# Patient Record
Sex: MALE | Race: WHITE | Employment: FULL TIME | URBAN - METROPOLITAN AREA
[De-identification: names, ages, dates, MRNs, and addresses within clinical notes are randomized per-mention and may not be internally consistent; named-entity substitution may affect disease eponyms.]

---

## 2017-01-09 ENCOUNTER — OFFICE VISIT (OUTPATIENT)
Dept: INTERNAL MEDICINE CLINIC | Age: 67
End: 2017-01-09

## 2017-01-09 VITALS
TEMPERATURE: 98 F | HEIGHT: 72 IN | WEIGHT: 234 LBS | SYSTOLIC BLOOD PRESSURE: 136 MMHG | HEART RATE: 76 BPM | DIASTOLIC BLOOD PRESSURE: 60 MMHG | BODY MASS INDEX: 31.69 KG/M2

## 2017-01-09 DIAGNOSIS — E78.49 OTHER HYPERLIPIDEMIA: ICD-10-CM

## 2017-01-09 DIAGNOSIS — Z01.818 PRE-OP TESTING: ICD-10-CM

## 2017-01-09 DIAGNOSIS — M25.562 ACUTE PAIN OF LEFT KNEE: ICD-10-CM

## 2017-01-09 DIAGNOSIS — I10 ESSENTIAL HYPERTENSION: Primary | Chronic | ICD-10-CM

## 2017-01-09 LAB
ALBUMIN SERPL-MCNC: 4.5 G/DL (ref 3.4–5)
ANION GAP SERPL CALCULATED.3IONS-SCNC: 14 MMOL/L (ref 3–16)
BASOPHILS ABSOLUTE: 0 K/UL (ref 0–0.2)
BASOPHILS RELATIVE PERCENT: 0.4 %
BUN BLDV-MCNC: 14 MG/DL (ref 7–20)
CALCIUM SERPL-MCNC: 8.9 MG/DL (ref 8.3–10.6)
CHLORIDE BLD-SCNC: 102 MMOL/L (ref 99–110)
CO2: 23 MMOL/L (ref 21–32)
CREAT SERPL-MCNC: 1.1 MG/DL (ref 0.8–1.3)
EOSINOPHILS ABSOLUTE: 0.3 K/UL (ref 0–0.6)
EOSINOPHILS RELATIVE PERCENT: 3.6 %
GFR AFRICAN AMERICAN: >60
GFR NON-AFRICAN AMERICAN: >60
GLUCOSE BLD-MCNC: 84 MG/DL (ref 70–99)
HCT VFR BLD CALC: 44.6 % (ref 40.5–52.5)
HEMOGLOBIN: 14.7 G/DL (ref 13.5–17.5)
LYMPHOCYTES ABSOLUTE: 2.3 K/UL (ref 1–5.1)
LYMPHOCYTES RELATIVE PERCENT: 27.2 %
MCH RBC QN AUTO: 30 PG (ref 26–34)
MCHC RBC AUTO-ENTMCNC: 32.9 G/DL (ref 31–36)
MCV RBC AUTO: 91.2 FL (ref 80–100)
MONOCYTES ABSOLUTE: 1 K/UL (ref 0–1.3)
MONOCYTES RELATIVE PERCENT: 11.8 %
NEUTROPHILS ABSOLUTE: 4.9 K/UL (ref 1.7–7.7)
NEUTROPHILS RELATIVE PERCENT: 57 %
PDW BLD-RTO: 13.4 % (ref 12.4–15.4)
PHOSPHORUS: 3.2 MG/DL (ref 2.5–4.9)
PLATELET # BLD: 201 K/UL (ref 135–450)
PMV BLD AUTO: 9.9 FL (ref 5–10.5)
POTASSIUM SERPL-SCNC: 4.5 MMOL/L (ref 3.5–5.1)
RBC # BLD: 4.89 M/UL (ref 4.2–5.9)
SODIUM BLD-SCNC: 139 MMOL/L (ref 136–145)
WBC # BLD: 8.6 K/UL (ref 4–11)

## 2017-01-09 PROCEDURE — 99214 OFFICE O/P EST MOD 30 MIN: CPT | Performed by: INTERNAL MEDICINE

## 2017-01-09 PROCEDURE — 93000 ELECTROCARDIOGRAM COMPLETE: CPT | Performed by: INTERNAL MEDICINE

## 2017-01-09 ASSESSMENT — PATIENT HEALTH QUESTIONNAIRE - PHQ9
1. LITTLE INTEREST OR PLEASURE IN DOING THINGS: 0
SUM OF ALL RESPONSES TO PHQ9 QUESTIONS 1 & 2: 0
SUM OF ALL RESPONSES TO PHQ QUESTIONS 1-9: 0
2. FEELING DOWN, DEPRESSED OR HOPELESS: 0

## 2017-02-23 RX ORDER — IBUPROFEN 800 MG/1
800 TABLET ORAL EVERY 8 HOURS PRN
Qty: 90 TABLET | Refills: 3 | Status: SHIPPED | OUTPATIENT
Start: 2017-02-23 | End: 2018-04-09 | Stop reason: SDUPTHER

## 2017-03-15 ENCOUNTER — TELEPHONE (OUTPATIENT)
Dept: INTERNAL MEDICINE CLINIC | Age: 67
End: 2017-03-15

## 2017-04-07 ENCOUNTER — OFFICE VISIT (OUTPATIENT)
Dept: INTERNAL MEDICINE CLINIC | Age: 67
End: 2017-04-07

## 2017-04-07 VITALS
HEIGHT: 72 IN | HEART RATE: 60 BPM | DIASTOLIC BLOOD PRESSURE: 66 MMHG | BODY MASS INDEX: 31.42 KG/M2 | WEIGHT: 232 LBS | TEMPERATURE: 97.8 F | SYSTOLIC BLOOD PRESSURE: 130 MMHG

## 2017-04-07 DIAGNOSIS — R05.9 COUGH: ICD-10-CM

## 2017-04-07 DIAGNOSIS — Z12.5 PROSTATE CANCER SCREENING: ICD-10-CM

## 2017-04-07 DIAGNOSIS — E78.49 OTHER HYPERLIPIDEMIA: Primary | Chronic | ICD-10-CM

## 2017-04-07 DIAGNOSIS — Z23 NEED FOR VACCINATION WITH 13-POLYVALENT PNEUMOCOCCAL CONJUGATE VACCINE: ICD-10-CM

## 2017-04-07 PROCEDURE — 90670 PCV13 VACCINE IM: CPT | Performed by: INTERNAL MEDICINE

## 2017-04-07 PROCEDURE — 90471 IMMUNIZATION ADMIN: CPT | Performed by: INTERNAL MEDICINE

## 2017-04-07 PROCEDURE — 99214 OFFICE O/P EST MOD 30 MIN: CPT | Performed by: INTERNAL MEDICINE

## 2017-04-22 ENCOUNTER — HOSPITAL ENCOUNTER (OUTPATIENT)
Dept: OTHER | Age: 67
Discharge: HOME | End: 2017-04-22
Attending: INTERNAL MEDICINE | Admitting: INTERNAL MEDICINE

## 2017-04-22 LAB
A/G RATIO: 1.5 (ref 1.1–2.2)
ALBUMIN SERPL-MCNC: 4.1 G/DL (ref 3.4–5)
ALP BLD-CCNC: 57 U/L (ref 40–129)
ALT SERPL-CCNC: 24 U/L (ref 10–40)
ANION GAP SERPL CALCULATED.3IONS-SCNC: 12 MMOL/L (ref 3–16)
AST SERPL-CCNC: 24 U/L (ref 15–37)
BILIRUB SERPL-MCNC: 0.7 MG/DL (ref 0–1)
BUN BLDV-MCNC: 13 MG/DL (ref 7–20)
CALCIUM SERPL-MCNC: 8.6 MG/DL (ref 8.3–10.6)
CHLORIDE BLD-SCNC: 103 MMOL/L (ref 99–110)
CHOLESTEROL, TOTAL: 132 MG/DL (ref 0–199)
CO2: 24 MMOL/L (ref 21–32)
CREAT SERPL-MCNC: 0.7 MG/DL (ref 0.8–1.3)
GFR AFRICAN AMERICAN: >60
GFR NON-AFRICAN AMERICAN: >60
GLOBULIN: 2.7 G/DL
GLUCOSE BLD-MCNC: 116 MG/DL (ref 70–99)
HDLC SERPL-MCNC: 63 MG/DL (ref 40–60)
LDL CHOLESTEROL CALCULATED: 50 MG/DL
POTASSIUM SERPL-SCNC: 4.5 MMOL/L (ref 3.5–5.1)
PROSTATE SPECIFIC ANTIGEN: 2.46 NG/ML (ref 0–4)
SODIUM BLD-SCNC: 139 MMOL/L (ref 136–145)
TOTAL PROTEIN: 6.8 G/DL (ref 6.4–8.2)
TRIGL SERPL-MCNC: 97 MG/DL (ref 0–150)
VLDLC SERPL CALC-MCNC: 19 MG/DL

## 2017-10-09 ENCOUNTER — OFFICE VISIT (OUTPATIENT)
Dept: INTERNAL MEDICINE CLINIC | Age: 67
End: 2017-10-09

## 2017-10-09 VITALS
HEART RATE: 64 BPM | DIASTOLIC BLOOD PRESSURE: 70 MMHG | HEIGHT: 72 IN | WEIGHT: 229 LBS | SYSTOLIC BLOOD PRESSURE: 136 MMHG | BODY MASS INDEX: 31.02 KG/M2

## 2017-10-09 DIAGNOSIS — Z23 NEED FOR INFLUENZA VACCINATION: ICD-10-CM

## 2017-10-09 DIAGNOSIS — I10 ESSENTIAL HYPERTENSION: Primary | ICD-10-CM

## 2017-10-09 PROCEDURE — 99213 OFFICE O/P EST LOW 20 MIN: CPT | Performed by: INTERNAL MEDICINE

## 2017-10-09 PROCEDURE — 90471 IMMUNIZATION ADMIN: CPT | Performed by: INTERNAL MEDICINE

## 2017-10-09 PROCEDURE — 90662 IIV NO PRSV INCREASED AG IM: CPT | Performed by: INTERNAL MEDICINE

## 2017-10-09 RX ORDER — SIMVASTATIN 40 MG
40 TABLET ORAL NIGHTLY
Qty: 90 TABLET | Refills: 3 | Status: SHIPPED | OUTPATIENT
Start: 2017-10-09 | End: 2018-12-19 | Stop reason: SDUPTHER

## 2017-10-09 RX ORDER — LISINOPRIL 10 MG/1
10 TABLET ORAL DAILY
Qty: 90 TABLET | Refills: 3 | Status: SHIPPED | OUTPATIENT
Start: 2017-10-09 | End: 2018-09-20 | Stop reason: SDUPTHER

## 2017-10-10 NOTE — PROGRESS NOTES
Component Value Date    LDLCALC 50 04/22/2017    LDLCALC 71 04/08/2016    LDLCALC 78 04/24/2015     Lab Results   Component Value Date    LABVLDL 19 04/22/2017    LABVLDL 19 04/08/2016    LABVLDL 24 04/24/2015     Lab Results   Component Value Date    CREATININE 0.7 (L) 04/22/2017         ASSESSMENT/PLAN[de-identified]  The patient has achieved JNC guidelines. The below problem list has been reviewed and updated as indicated    1. Essential hypertension     2.  Need for influenza vaccination

## 2017-11-13 RX ORDER — IBUPROFEN 800 MG/1
TABLET ORAL
Qty: 270 TABLET | Refills: 0 | Status: SHIPPED | OUTPATIENT
Start: 2017-11-13 | End: 2021-02-09

## 2017-12-11 ENCOUNTER — OFFICE VISIT (OUTPATIENT)
Dept: INTERNAL MEDICINE CLINIC | Age: 67
End: 2017-12-11

## 2017-12-11 ENCOUNTER — HOSPITAL ENCOUNTER (OUTPATIENT)
Dept: OTHER | Age: 67
Discharge: HOME | End: 2017-12-11
Attending: INTERNAL MEDICINE | Admitting: INTERNAL MEDICINE

## 2017-12-11 VITALS
SYSTOLIC BLOOD PRESSURE: 138 MMHG | BODY MASS INDEX: 31.56 KG/M2 | WEIGHT: 233 LBS | HEART RATE: 60 BPM | DIASTOLIC BLOOD PRESSURE: 70 MMHG | HEIGHT: 72 IN | TEMPERATURE: 98.2 F

## 2017-12-11 DIAGNOSIS — R73.9 HYPERGLYCEMIA: ICD-10-CM

## 2017-12-11 DIAGNOSIS — Z01.818 PRE-OP EXAM: ICD-10-CM

## 2017-12-11 DIAGNOSIS — I10 ESSENTIAL HYPERTENSION: Chronic | ICD-10-CM

## 2017-12-11 DIAGNOSIS — I10 ESSENTIAL HYPERTENSION: Primary | Chronic | ICD-10-CM

## 2017-12-11 DIAGNOSIS — E55.9 VITAMIN D DEFICIENCY: ICD-10-CM

## 2017-12-11 LAB
ALBUMIN SERPL-MCNC: 4.6 G/DL (ref 3.4–5)
ANION GAP SERPL CALCULATED.3IONS-SCNC: 17 MMOL/L (ref 3–16)
BASOPHILS ABSOLUTE: 0 K/UL (ref 0–0.2)
BASOPHILS RELATIVE PERCENT: 0.6 %
BUN BLDV-MCNC: 14 MG/DL (ref 7–20)
CALCIUM SERPL-MCNC: 9.2 MG/DL (ref 8.3–10.6)
CHLORIDE BLD-SCNC: 100 MMOL/L (ref 99–110)
CO2: 24 MMOL/L (ref 21–32)
CREAT SERPL-MCNC: 0.8 MG/DL (ref 0.8–1.3)
EOSINOPHILS ABSOLUTE: 0.3 K/UL (ref 0–0.6)
EOSINOPHILS RELATIVE PERCENT: 4.2 %
GFR AFRICAN AMERICAN: >60
GFR NON-AFRICAN AMERICAN: >60
GLUCOSE BLD-MCNC: 86 MG/DL (ref 70–99)
HCT VFR BLD CALC: 44.2 % (ref 40.5–52.5)
HEMOGLOBIN: 14.7 G/DL (ref 13.5–17.5)
LYMPHOCYTES ABSOLUTE: 2 K/UL (ref 1–5.1)
LYMPHOCYTES RELATIVE PERCENT: 26 %
MCH RBC QN AUTO: 30.7 PG (ref 26–34)
MCHC RBC AUTO-ENTMCNC: 33.2 G/DL (ref 31–36)
MCV RBC AUTO: 92.4 FL (ref 80–100)
MONOCYTES ABSOLUTE: 0.8 K/UL (ref 0–1.3)
MONOCYTES RELATIVE PERCENT: 9.9 %
NEUTROPHILS ABSOLUTE: 4.7 K/UL (ref 1.7–7.7)
NEUTROPHILS RELATIVE PERCENT: 59.3 %
PDW BLD-RTO: 13.6 % (ref 12.4–15.4)
PHOSPHORUS: 3.8 MG/DL (ref 2.5–4.9)
PLATELET # BLD: 208 K/UL (ref 135–450)
PMV BLD AUTO: 9.4 FL (ref 5–10.5)
POTASSIUM SERPL-SCNC: 4.3 MMOL/L (ref 3.5–5.1)
RBC # BLD: 4.78 M/UL (ref 4.2–5.9)
SODIUM BLD-SCNC: 141 MMOL/L (ref 136–145)
WBC # BLD: 7.9 K/UL (ref 4–11)

## 2017-12-11 PROCEDURE — G8427 DOCREV CUR MEDS BY ELIG CLIN: HCPCS | Performed by: INTERNAL MEDICINE

## 2017-12-11 PROCEDURE — G8484 FLU IMMUNIZE NO ADMIN: HCPCS | Performed by: INTERNAL MEDICINE

## 2017-12-11 PROCEDURE — G8417 CALC BMI ABV UP PARAM F/U: HCPCS | Performed by: INTERNAL MEDICINE

## 2017-12-11 PROCEDURE — 1036F TOBACCO NON-USER: CPT | Performed by: INTERNAL MEDICINE

## 2017-12-11 PROCEDURE — 1123F ACP DISCUSS/DSCN MKR DOCD: CPT | Performed by: INTERNAL MEDICINE

## 2017-12-11 PROCEDURE — 4040F PNEUMOC VAC/ADMIN/RCVD: CPT | Performed by: INTERNAL MEDICINE

## 2017-12-11 PROCEDURE — 93000 ELECTROCARDIOGRAM COMPLETE: CPT | Performed by: INTERNAL MEDICINE

## 2017-12-11 PROCEDURE — 3017F COLORECTAL CA SCREEN DOC REV: CPT | Performed by: INTERNAL MEDICINE

## 2017-12-11 PROCEDURE — 99214 OFFICE O/P EST MOD 30 MIN: CPT | Performed by: INTERNAL MEDICINE

## 2017-12-12 LAB
ESTIMATED AVERAGE GLUCOSE: 139.9 MG/DL
HBA1C MFR BLD: 6.5 %
VITAMIN D 25-HYDROXY: 20.6 NG/ML

## 2017-12-12 NOTE — PROGRESS NOTES
Chief Complaint   Patient presents with    Pre-op Exam     foot surgery        Nicole Alanis 78 y.o. male is here for Preoperative assessment hypertension. The patient denies chest pain, chest tightness, shortness of breath or other cardiovascular symptoms suggesting end organ damage from their hypertension. There has been compliance to medications and no new problems are reported. I reviewed the requested preoperative document and have a chest x-ray and vitamin D level are requested. The patient has had general anesthesia in the past without complications    Please refer to the completed preoperative assessment which has been sent to the referring surgeon and scanned into the media section    Current Outpatient Prescriptions on File Prior to Visit   Medication Sig Dispense Refill    ibuprofen (ADVIL;MOTRIN) 800 MG tablet TAKE 1 TABLET BY MOUTH EVERY 8 HOURS AS NEEDED FOR PAIN 270 tablet 0    lisinopril (PRINIVIL;ZESTRIL) 10 MG tablet Take 1 tablet by mouth daily 90 tablet 3    simvastatin (ZOCOR) 40 MG tablet Take 1 tablet by mouth nightly 90 tablet 3    ibuprofen (ADVIL;MOTRIN) 800 MG tablet Take 1 tablet by mouth every 8 hours as needed for Pain 90 tablet 3     No current facility-administered medications on file prior to visit.         Past Medical History:   Diagnosis Date    Hyperlipidemia     Hypertension     Type II or unspecified type diabetes mellitus without mention of complication, not stated as uncontrolled            /70   Pulse 60   Temp 98.2 °F (36.8 °C)   Ht 6' (1.829 m)   Wt 233 lb (105.7 kg)   BMI 31.60 kg/m²     General Appearance:  Alert, cooperative, no distress, appears stated age   Head:  Normocephalic, without obvious abnormality, atraumatic   Neck: Supple, symmetrical, trachea midline, no adenopathy, thyroid: not enlarged, symmetric, no tenderness/mass/nodules, no carotid bruit or JVD   Lungs:   Clear to auscultation bilaterally, respirations unlabored   Chest

## 2017-12-13 ENCOUNTER — TELEPHONE (OUTPATIENT)
Dept: RHEUMATOLOGY | Age: 67
End: 2017-12-13

## 2017-12-14 ENCOUNTER — TELEPHONE (OUTPATIENT)
Dept: INTERNAL MEDICINE CLINIC | Age: 67
End: 2017-12-14

## 2017-12-14 NOTE — TELEPHONE ENCOUNTER
Pt's surgery is scheduled for 1 pm today with Dr. Nguyen Ramirez, his A1c is creeping up and is at 6.5%. Dr. Nguyen Ramirez is concerned with wound issues with his A1c going up that high. They were wondering if Dr. Lokesh Wei would address that A1c and possibly put him on medication for that to make the healing process better. Dr. Nguyen Ramirez is just trying to be proactive in the healing process.

## 2017-12-14 NOTE — TELEPHONE ENCOUNTER
The best treatment for a hemoglobin A1c of 6.5% and a body mass index of 31 is to lose weight, this would be more effective than taking medications and would have no side effects

## 2018-04-09 ENCOUNTER — OFFICE VISIT (OUTPATIENT)
Dept: INTERNAL MEDICINE CLINIC | Age: 68
End: 2018-04-09

## 2018-04-09 VITALS
SYSTOLIC BLOOD PRESSURE: 138 MMHG | WEIGHT: 239 LBS | BODY MASS INDEX: 32.37 KG/M2 | HEIGHT: 72 IN | HEART RATE: 72 BPM | DIASTOLIC BLOOD PRESSURE: 78 MMHG

## 2018-04-09 DIAGNOSIS — I10 ESSENTIAL HYPERTENSION: Chronic | ICD-10-CM

## 2018-04-09 DIAGNOSIS — E55.9 VITAMIN D DEFICIENCY: ICD-10-CM

## 2018-04-09 DIAGNOSIS — Z12.5 PROSTATE CANCER SCREENING: ICD-10-CM

## 2018-04-09 DIAGNOSIS — E78.49 OTHER HYPERLIPIDEMIA: Chronic | ICD-10-CM

## 2018-04-09 DIAGNOSIS — Z23 NEED FOR 23-POLYVALENT PNEUMOCOCCAL POLYSACCHARIDE VACCINE: ICD-10-CM

## 2018-04-09 DIAGNOSIS — Z00.00 ROUTINE GENERAL MEDICAL EXAMINATION AT A HEALTH CARE FACILITY: Primary | ICD-10-CM

## 2018-04-09 PROCEDURE — 99397 PER PM REEVAL EST PAT 65+ YR: CPT | Performed by: INTERNAL MEDICINE

## 2018-04-09 PROCEDURE — 90732 PPSV23 VACC 2 YRS+ SUBQ/IM: CPT | Performed by: INTERNAL MEDICINE

## 2018-04-09 PROCEDURE — 90471 IMMUNIZATION ADMIN: CPT | Performed by: INTERNAL MEDICINE

## 2018-04-09 ASSESSMENT — PATIENT HEALTH QUESTIONNAIRE - PHQ9
1. LITTLE INTEREST OR PLEASURE IN DOING THINGS: 0
2. FEELING DOWN, DEPRESSED OR HOPELESS: 0
SUM OF ALL RESPONSES TO PHQ QUESTIONS 1-9: 0
SUM OF ALL RESPONSES TO PHQ9 QUESTIONS 1 & 2: 0

## 2018-04-21 ENCOUNTER — HOSPITAL ENCOUNTER (OUTPATIENT)
Dept: OTHER | Age: 68
Discharge: HOME | End: 2018-04-21
Attending: INTERNAL MEDICINE | Admitting: INTERNAL MEDICINE

## 2018-04-21 LAB
A/G RATIO: 1.5 (ref 1.1–2.2)
ALBUMIN SERPL-MCNC: 4.5 G/DL (ref 3.4–5)
ALP BLD-CCNC: 68 U/L (ref 40–129)
ALT SERPL-CCNC: 34 U/L (ref 10–40)
ANION GAP SERPL CALCULATED.3IONS-SCNC: 14 MMOL/L (ref 3–16)
AST SERPL-CCNC: 27 U/L (ref 15–37)
BILIRUB SERPL-MCNC: 0.5 MG/DL (ref 0–1)
BUN BLDV-MCNC: 13 MG/DL (ref 7–20)
CALCIUM SERPL-MCNC: 9.3 MG/DL (ref 8.3–10.6)
CHLORIDE BLD-SCNC: 105 MMOL/L (ref 99–110)
CHOLESTEROL, TOTAL: 166 MG/DL (ref 0–199)
CO2: 25 MMOL/L (ref 21–32)
CREAT SERPL-MCNC: 0.7 MG/DL (ref 0.8–1.3)
GFR AFRICAN AMERICAN: >60
GFR NON-AFRICAN AMERICAN: >60
GLOBULIN: 3.1 G/DL
GLUCOSE BLD-MCNC: 119 MG/DL (ref 70–99)
HDLC SERPL-MCNC: 70 MG/DL (ref 40–60)
LDL CHOLESTEROL CALCULATED: 74 MG/DL
POTASSIUM SERPL-SCNC: 5.2 MMOL/L (ref 3.5–5.1)
PROSTATE SPECIFIC ANTIGEN: 2.76 NG/ML (ref 0–4)
SODIUM BLD-SCNC: 144 MMOL/L (ref 136–145)
T4 FREE: 1 NG/DL (ref 0.9–1.8)
TOTAL PROTEIN: 7.6 G/DL (ref 6.4–8.2)
TRIGL SERPL-MCNC: 108 MG/DL (ref 0–150)
TSH REFLEX: 1.56 UIU/ML (ref 0.27–4.2)
VITAMIN D 25-HYDROXY: 25.3 NG/ML
VLDLC SERPL CALC-MCNC: 22 MG/DL

## 2018-04-23 RX ORDER — MELATONIN
1000 DAILY
Qty: 30 TABLET | Refills: 0 | COMMUNITY
Start: 2018-04-23

## 2018-09-20 RX ORDER — LISINOPRIL 10 MG/1
10 TABLET ORAL DAILY
Qty: 90 TABLET | Refills: 3 | Status: SHIPPED | OUTPATIENT
Start: 2018-09-20 | End: 2019-09-17 | Stop reason: SDUPTHER

## 2018-10-08 ENCOUNTER — OFFICE VISIT (OUTPATIENT)
Dept: INTERNAL MEDICINE CLINIC | Age: 68
End: 2018-10-08
Payer: COMMERCIAL

## 2018-10-08 VITALS
SYSTOLIC BLOOD PRESSURE: 120 MMHG | WEIGHT: 234 LBS | DIASTOLIC BLOOD PRESSURE: 80 MMHG | HEART RATE: 72 BPM | BODY MASS INDEX: 31.74 KG/M2

## 2018-10-08 DIAGNOSIS — E78.49 OTHER HYPERLIPIDEMIA: ICD-10-CM

## 2018-10-08 DIAGNOSIS — M54.50 ACUTE LEFT-SIDED LOW BACK PAIN WITHOUT SCIATICA: ICD-10-CM

## 2018-10-08 DIAGNOSIS — Z23 NEED FOR INFLUENZA VACCINATION: Primary | ICD-10-CM

## 2018-10-08 DIAGNOSIS — I10 ESSENTIAL HYPERTENSION: ICD-10-CM

## 2018-10-08 PROCEDURE — 99214 OFFICE O/P EST MOD 30 MIN: CPT | Performed by: INTERNAL MEDICINE

## 2018-10-08 PROCEDURE — G8417 CALC BMI ABV UP PARAM F/U: HCPCS | Performed by: INTERNAL MEDICINE

## 2018-10-08 PROCEDURE — G8482 FLU IMMUNIZE ORDER/ADMIN: HCPCS | Performed by: INTERNAL MEDICINE

## 2018-10-08 PROCEDURE — 90662 IIV NO PRSV INCREASED AG IM: CPT | Performed by: INTERNAL MEDICINE

## 2018-10-08 PROCEDURE — 1036F TOBACCO NON-USER: CPT | Performed by: INTERNAL MEDICINE

## 2018-10-08 PROCEDURE — 4040F PNEUMOC VAC/ADMIN/RCVD: CPT | Performed by: INTERNAL MEDICINE

## 2018-10-08 PROCEDURE — 90471 IMMUNIZATION ADMIN: CPT | Performed by: INTERNAL MEDICINE

## 2018-10-08 PROCEDURE — G8427 DOCREV CUR MEDS BY ELIG CLIN: HCPCS | Performed by: INTERNAL MEDICINE

## 2018-10-08 PROCEDURE — 1101F PT FALLS ASSESS-DOCD LE1/YR: CPT | Performed by: INTERNAL MEDICINE

## 2018-10-08 PROCEDURE — 3017F COLORECTAL CA SCREEN DOC REV: CPT | Performed by: INTERNAL MEDICINE

## 2018-10-08 PROCEDURE — 1123F ACP DISCUSS/DSCN MKR DOCD: CPT | Performed by: INTERNAL MEDICINE

## 2018-10-09 NOTE — PROGRESS NOTES
auscultation bilaterally, respirations unlabored   Chest Wall:  No tenderness or deformity   Heart:  Regular rate and rhythm, S1, S2 normal, no murmur, rub or gallop   Abdomen:   Soft, non-tender, bowel sounds active all four quadrants,  no masses, no organomegaly   Skin: Skin color, texture, turgor normal, no rashes or lesions   Lymph nodes: Cervical, supraclavicular  Adenopathy is absent   Neurologic    Musculoskeletal: No focal neurological deficits noted  Lumbar spine slightly diminished range of motion secondary to pain hips show full range of motion knees full range of motion negative straight leg raising deep tendon reflexes 2+ and symmetric sensation intact      No components found for: CHLPL  Lab Results   Component Value Date    TRIG 108 04/21/2018    TRIG 97 04/22/2017    TRIG 94 04/08/2016     Lab Results   Component Value Date    HDL 70 (H) 04/21/2018    HDL 63 (H) 04/22/2017    HDL 58 04/08/2016     Lab Results   Component Value Date    LDLCALC 74 04/21/2018    LDLCALC 50 04/22/2017    LDLCALC 71 04/08/2016     Lab Results   Component Value Date    LABVLDL 22 04/21/2018    LABVLDL 19 04/22/2017    LABVLDL 19 04/08/2016     Lab Results   Component Value Date    CREATININE 0.7 (L) 04/21/2018       The 10-year ASCVD risk score (Agata Bales et al., 2013) is: 11.5%    Values used to calculate the score:      Age: 80 years      Sex: Male      Is Non- : No      Diabetic: No      Tobacco smoker: No      Systolic Blood Pressure: 015 mmHg      Is BP treated: Yes      HDL Cholesterol: 70 mg/dL      Total Cholesterol: 166 mg/dL     ASSESSMENT/PLAN[de-identified]   He is to continue his simvastatin for hyperlipidemia    Continue lisinopril hydrochlorothiazide for hypertension    Range of motion exercises, heat and rest for musculoskeletal low back pain     Diagnosis Orders   1.  Need for influenza vaccination  INFLUENZA, HIGH DOSE, 65 YRS +, IM, PF, PREFILL SYR, 0.5ML (FLUZONE HD)   2. Other

## 2018-12-19 RX ORDER — SIMVASTATIN 40 MG
40 TABLET ORAL NIGHTLY
Qty: 90 TABLET | Refills: 3 | Status: SHIPPED | OUTPATIENT
Start: 2018-12-19 | End: 2019-12-20

## 2019-04-08 ENCOUNTER — OFFICE VISIT (OUTPATIENT)
Dept: INTERNAL MEDICINE CLINIC | Age: 69
End: 2019-04-08
Payer: MEDICARE

## 2019-04-08 VITALS
SYSTOLIC BLOOD PRESSURE: 136 MMHG | BODY MASS INDEX: 32.55 KG/M2 | WEIGHT: 240 LBS | HEART RATE: 72 BPM | DIASTOLIC BLOOD PRESSURE: 80 MMHG

## 2019-04-08 DIAGNOSIS — I10 ESSENTIAL HYPERTENSION: ICD-10-CM

## 2019-04-08 DIAGNOSIS — E78.49 OTHER HYPERLIPIDEMIA: Primary | ICD-10-CM

## 2019-04-08 DIAGNOSIS — Z12.5 PROSTATE CANCER SCREENING: ICD-10-CM

## 2019-04-08 PROCEDURE — 1036F TOBACCO NON-USER: CPT | Performed by: INTERNAL MEDICINE

## 2019-04-08 PROCEDURE — G8417 CALC BMI ABV UP PARAM F/U: HCPCS | Performed by: INTERNAL MEDICINE

## 2019-04-08 PROCEDURE — G8427 DOCREV CUR MEDS BY ELIG CLIN: HCPCS | Performed by: INTERNAL MEDICINE

## 2019-04-08 PROCEDURE — 1123F ACP DISCUSS/DSCN MKR DOCD: CPT | Performed by: INTERNAL MEDICINE

## 2019-04-08 PROCEDURE — 3017F COLORECTAL CA SCREEN DOC REV: CPT | Performed by: INTERNAL MEDICINE

## 2019-04-08 PROCEDURE — 99214 OFFICE O/P EST MOD 30 MIN: CPT | Performed by: INTERNAL MEDICINE

## 2019-04-08 PROCEDURE — 4040F PNEUMOC VAC/ADMIN/RCVD: CPT | Performed by: INTERNAL MEDICINE

## 2019-04-08 ASSESSMENT — PATIENT HEALTH QUESTIONNAIRE - PHQ9
1. LITTLE INTEREST OR PLEASURE IN DOING THINGS: 0
2. FEELING DOWN, DEPRESSED OR HOPELESS: 0
SUM OF ALL RESPONSES TO PHQ QUESTIONS 1-9: 0
SUM OF ALL RESPONSES TO PHQ QUESTIONS 1-9: 0
SUM OF ALL RESPONSES TO PHQ9 QUESTIONS 1 & 2: 0

## 2019-04-08 NOTE — PROGRESS NOTES
Shira Rodriguez 80 y.o. male is here for physical examination and follow-up of hypertension and hyperlipidemia. The patient denies chest pain, chest tightness, shortness of breath or other cardiovascular symptoms suggesting end organ damage from their hyperlipidemia and hypertension. There has been compliance to medications and no new problems are reported. Review of his record shows he has a long history of having an abnormal digital rectal examination with a firm area in the midportion of his prostate. Review of his PSAs shows they have never exceeded the upper limit of normal although they do appear to be going up at a rate consistent with age    He is planning a trip this summer to [facility] with his family       Current Outpatient Medications on File Prior to Visit   Medication Sig Dispense Refill    simvastatin (ZOCOR) 40 MG tablet TAKE 1 TABLET BY MOUTH NIGHTLY 90 tablet 3    lisinopril (PRINIVIL;ZESTRIL) 10 MG tablet TAKE 1 TABLET BY MOUTH DAILY 90 tablet 3    Cholecalciferol (VITAMIN D3) 1000 units TABS Take 1 tablet by mouth daily 30 tablet 0    ibuprofen (ADVIL;MOTRIN) 800 MG tablet TAKE 1 TABLET BY MOUTH EVERY 8 HOURS AS NEEDED FOR PAIN 270 tablet 0     No current facility-administered medications on file prior to visit. Past Medical History:   Diagnosis Date    Hyperlipidemia     Hypertension     Type II or unspecified type diabetes mellitus without mention of complication, not stated as uncontrolled            /80   Pulse 72   Wt 240 lb (108.9 kg)   BMI 32.55 kg/m²   Physical Exam   Constitutional: He appears well-developed and well-nourished. Cardiovascular: Normal rate, regular rhythm, S1 normal, S2 normal, normal heart sounds and intact distal pulses. Exam reveals no S3.    Pulmonary/Chest: Effort normal and breath sounds normal. No respiratory distress. He has no wheezes. He has no rales. He exhibits no tenderness. Abdominal: Soft.  Bowel sounds are normal. He exhibits no distension and no mass. There is no splenomegaly or hepatomegaly. No tenderness. He has no rebound and no guarding. Hernia confirmed negative in the right inguinal area and confirmed negative in the left inguinal area. Digital rectal examination there is a piece size area of induration in the prostate midportion toward the right   Skin: Skin is warm and dry. No rash noted. Psychiatric: He has a normal mood and affect. Judgment and thought content normal.   Lab Results   Component Value Date    TRIG 108 04/21/2018    TRIG 97 04/22/2017    TRIG 94 04/08/2016     Lab Results   Component Value Date    HDL 70 (H) 04/21/2018    HDL 63 (H) 04/22/2017    HDL 58 04/08/2016     Lab Results   Component Value Date    LDLCALC 74 04/21/2018    LDLCALC 50 04/22/2017    LDLCALC 71 04/08/2016     Lab Results   Component Value Date    LABVLDL 22 04/21/2018    LABVLDL 19 04/22/2017    LABVLDL 19 04/08/2016     Lab Results   Component Value Date    CREATININE 0.7 (L) 04/21/2018         ASSESSMENT/PLAN[de-identified]  The patient has achieved JNC guidelines. Abnormal digital rectal examination obtain PSA    Continue active ongoing treatment for hypertension    Continuing ongoing treatment for hyperlipidemia       Diagnosis Orders   1. Other hyperlipidemia  Comprehensive Metabolic Panel    Lipid Panel    Psa screening   2. Essential hypertension  Comprehensive Metabolic Panel    Lipid Panel    Psa screening   3.  Prostate cancer screening  Comprehensive Metabolic Panel    Lipid Panel    Psa screening

## 2019-04-18 LAB
A/G RATIO: 1.6 (ref 1.1–2.2)
ALBUMIN SERPL-MCNC: 4.2 G/DL (ref 3.4–5)
ALP BLD-CCNC: 64 U/L (ref 40–129)
ALT SERPL-CCNC: 51 U/L (ref 10–40)
ANION GAP SERPL CALCULATED.3IONS-SCNC: 12 MMOL/L (ref 3–16)
AST SERPL-CCNC: 38 U/L (ref 15–37)
BILIRUB SERPL-MCNC: 0.6 MG/DL (ref 0–1)
BUN BLDV-MCNC: 14 MG/DL (ref 7–20)
CALCIUM SERPL-MCNC: 8.9 MG/DL (ref 8.3–10.6)
CHLORIDE BLD-SCNC: 105 MMOL/L (ref 99–110)
CHOLESTEROL, TOTAL: 136 MG/DL (ref 0–199)
CO2: 25 MMOL/L (ref 21–32)
CREAT SERPL-MCNC: 0.8 MG/DL (ref 0.8–1.3)
GFR AFRICAN AMERICAN: >60
GFR NON-AFRICAN AMERICAN: >60
GLOBULIN: 2.6 G/DL
GLUCOSE BLD-MCNC: 132 MG/DL (ref 70–99)
HDLC SERPL-MCNC: 54 MG/DL (ref 40–60)
LDL CHOLESTEROL CALCULATED: 63 MG/DL
POTASSIUM SERPL-SCNC: 4.2 MMOL/L (ref 3.5–5.1)
PROSTATE SPECIFIC ANTIGEN: 3.02 NG/ML (ref 0–4)
SODIUM BLD-SCNC: 142 MMOL/L (ref 136–145)
TOTAL PROTEIN: 6.8 G/DL (ref 6.4–8.2)
TRIGL SERPL-MCNC: 97 MG/DL (ref 0–150)
VLDLC SERPL CALC-MCNC: 19 MG/DL

## 2019-09-17 RX ORDER — LISINOPRIL 10 MG/1
TABLET ORAL
Qty: 90 TABLET | Refills: 3 | Status: SHIPPED | OUTPATIENT
Start: 2019-09-17 | End: 2020-09-15

## 2019-10-07 ENCOUNTER — OFFICE VISIT (OUTPATIENT)
Dept: INTERNAL MEDICINE CLINIC | Age: 69
End: 2019-10-07
Payer: MEDICARE

## 2019-10-07 VITALS
HEART RATE: 76 BPM | WEIGHT: 235 LBS | BODY MASS INDEX: 31.83 KG/M2 | SYSTOLIC BLOOD PRESSURE: 120 MMHG | TEMPERATURE: 97.9 F | HEIGHT: 72 IN | DIASTOLIC BLOOD PRESSURE: 60 MMHG

## 2019-10-07 DIAGNOSIS — Z23 NEED FOR INFLUENZA VACCINATION: ICD-10-CM

## 2019-10-07 DIAGNOSIS — I10 ESSENTIAL HYPERTENSION: Primary | ICD-10-CM

## 2019-10-07 PROCEDURE — G0008 ADMIN INFLUENZA VIRUS VAC: HCPCS | Performed by: INTERNAL MEDICINE

## 2019-10-07 PROCEDURE — 90653 IIV ADJUVANT VACCINE IM: CPT | Performed by: INTERNAL MEDICINE

## 2019-10-07 PROCEDURE — 4040F PNEUMOC VAC/ADMIN/RCVD: CPT | Performed by: INTERNAL MEDICINE

## 2019-10-07 PROCEDURE — G8417 CALC BMI ABV UP PARAM F/U: HCPCS | Performed by: INTERNAL MEDICINE

## 2019-10-07 PROCEDURE — G8482 FLU IMMUNIZE ORDER/ADMIN: HCPCS | Performed by: INTERNAL MEDICINE

## 2019-10-07 PROCEDURE — 1123F ACP DISCUSS/DSCN MKR DOCD: CPT | Performed by: INTERNAL MEDICINE

## 2019-10-07 PROCEDURE — G8427 DOCREV CUR MEDS BY ELIG CLIN: HCPCS | Performed by: INTERNAL MEDICINE

## 2019-10-07 PROCEDURE — 1036F TOBACCO NON-USER: CPT | Performed by: INTERNAL MEDICINE

## 2019-10-07 PROCEDURE — 99213 OFFICE O/P EST LOW 20 MIN: CPT | Performed by: INTERNAL MEDICINE

## 2019-10-07 PROCEDURE — 3017F COLORECTAL CA SCREEN DOC REV: CPT | Performed by: INTERNAL MEDICINE

## 2019-12-20 RX ORDER — SIMVASTATIN 40 MG
TABLET ORAL
Qty: 90 TABLET | Refills: 3 | Status: SHIPPED | OUTPATIENT
Start: 2019-12-20 | End: 2020-12-18

## 2020-05-05 ENCOUNTER — VIRTUAL VISIT (OUTPATIENT)
Dept: INTERNAL MEDICINE CLINIC | Age: 70
End: 2020-05-05
Payer: MEDICARE

## 2020-05-05 PROCEDURE — 1123F ACP DISCUSS/DSCN MKR DOCD: CPT | Performed by: INTERNAL MEDICINE

## 2020-05-05 PROCEDURE — 99213 OFFICE O/P EST LOW 20 MIN: CPT | Performed by: INTERNAL MEDICINE

## 2020-05-05 PROCEDURE — 3017F COLORECTAL CA SCREEN DOC REV: CPT | Performed by: INTERNAL MEDICINE

## 2020-05-05 PROCEDURE — G8427 DOCREV CUR MEDS BY ELIG CLIN: HCPCS | Performed by: INTERNAL MEDICINE

## 2020-05-05 PROCEDURE — 4040F PNEUMOC VAC/ADMIN/RCVD: CPT | Performed by: INTERNAL MEDICINE

## 2020-05-05 RX ORDER — CYCLOBENZAPRINE HCL 10 MG
10 TABLET ORAL 3 TIMES DAILY PRN
Qty: 21 TABLET | Refills: 0 | Status: SHIPPED | OUTPATIENT
Start: 2020-05-05 | End: 2020-05-15

## 2020-05-05 ASSESSMENT — PATIENT HEALTH QUESTIONNAIRE - PHQ9
SUM OF ALL RESPONSES TO PHQ QUESTIONS 1-9: 0
1. LITTLE INTEREST OR PLEASURE IN DOING THINGS: 0
SUM OF ALL RESPONSES TO PHQ QUESTIONS 1-9: 0
SUM OF ALL RESPONSES TO PHQ9 QUESTIONS 1 & 2: 0
2. FEELING DOWN, DEPRESSED OR HOPELESS: 0

## 2020-05-05 NOTE — PROGRESS NOTES
2020    TELEHEALTH EVALUATION -- Audio/Visual (During TKIJP-45 public health emergency)    HPI:    Domingo Castorena (:  1950) has requested an audio/video evaluation for the following concern(s):    The patient was evaluated via video visit secondary to back pain    He has pain just lateral to the right side of the lumbar spine radiating laterally but not into the inguinal area, the pain does not radiate in a radicular distribution into the lower extremity    He has not had any recent trauma. He has not had unusual activity other than doing activities such as cutting the grass and doing yard work. No bowel or bladder incontinence is reported    Review of Systems    Prior to Visit Medications    Medication Sig Taking? Authorizing Provider   cyclobenzaprine (FLEXERIL) 10 MG tablet Take 1 tablet by mouth 3 times daily as needed for Muscle spasms Yes Mehnaz Wang MD   simvastatin (ZOCOR) 40 MG tablet TAKE 1 TABLET BY MOUTH EVERY DAY AT NIGHT Yes Mehnaz Wang MD   lisinopril (PRINIVIL;ZESTRIL) 10 MG tablet TAKE 1 TABLET BY MOUTH EVERY DAY Yes Mehnaz Wang MD   Cholecalciferol (VITAMIN D3) 1000 units TABS Take 1 tablet by mouth daily Yes Mehnaz Wang MD   ibuprofen (ADVIL;MOTRIN) 800 MG tablet TAKE 1 TABLET BY MOUTH EVERY 8 HOURS AS NEEDED FOR PAIN Yes Mehnaz Wang MD       Social History     Tobacco Use    Smoking status: Never Smoker    Smokeless tobacco: Never Used   Substance Use Topics    Alcohol use: No    Drug use:  No            PHYSICAL EXAMINATION:  [ INSTRUCTIONS:  \"[x]\" Indicates a positive item  \"[]\" Indicates a negative item  -- DELETE ALL ITEMS NOT EXAMINED]  Vital Signs: (As obtained by patient/caregiver or practitioner observation)    Blood pressure-  Heart rate-    Respiratory rate-    Temperature-  Pulse oximetry-     Constitutional: [x] Appears well-developed and well-nourished [x] No apparent distress      [] Abnormal-   Mental status  [x] Alert and awake  [x]

## 2020-09-15 RX ORDER — LISINOPRIL 10 MG/1
TABLET ORAL
Qty: 90 TABLET | Refills: 3 | Status: SHIPPED | OUTPATIENT
Start: 2020-09-15 | End: 2021-09-17 | Stop reason: SDUPTHER

## 2020-12-18 RX ORDER — SIMVASTATIN 40 MG
TABLET ORAL
Qty: 90 TABLET | Refills: 3 | Status: SHIPPED | OUTPATIENT
Start: 2020-12-18 | End: 2021-09-17 | Stop reason: SDUPTHER

## 2021-02-09 ENCOUNTER — OFFICE VISIT (OUTPATIENT)
Dept: FAMILY MEDICINE CLINIC | Age: 71
End: 2021-02-09
Payer: MEDICARE

## 2021-02-09 VITALS
TEMPERATURE: 97.3 F | WEIGHT: 234 LBS | SYSTOLIC BLOOD PRESSURE: 118 MMHG | DIASTOLIC BLOOD PRESSURE: 80 MMHG | HEIGHT: 72 IN | BODY MASS INDEX: 31.69 KG/M2

## 2021-02-09 DIAGNOSIS — Z12.5 SCREENING FOR MALIGNANT NEOPLASM OF PROSTATE: ICD-10-CM

## 2021-02-09 DIAGNOSIS — R73.9 HYPERGLYCEMIA: Primary | ICD-10-CM

## 2021-02-09 DIAGNOSIS — E78.2 MIXED HYPERLIPIDEMIA: ICD-10-CM

## 2021-02-09 DIAGNOSIS — Z23 NEED FOR TDAP VACCINATION: ICD-10-CM

## 2021-02-09 DIAGNOSIS — I10 ESSENTIAL HYPERTENSION: ICD-10-CM

## 2021-02-09 PROCEDURE — 4040F PNEUMOC VAC/ADMIN/RCVD: CPT | Performed by: FAMILY MEDICINE

## 2021-02-09 PROCEDURE — 1036F TOBACCO NON-USER: CPT | Performed by: FAMILY MEDICINE

## 2021-02-09 PROCEDURE — G8417 CALC BMI ABV UP PARAM F/U: HCPCS | Performed by: FAMILY MEDICINE

## 2021-02-09 PROCEDURE — 90471 IMMUNIZATION ADMIN: CPT | Performed by: FAMILY MEDICINE

## 2021-02-09 PROCEDURE — 90715 TDAP VACCINE 7 YRS/> IM: CPT | Performed by: FAMILY MEDICINE

## 2021-02-09 PROCEDURE — 3017F COLORECTAL CA SCREEN DOC REV: CPT | Performed by: FAMILY MEDICINE

## 2021-02-09 PROCEDURE — G8482 FLU IMMUNIZE ORDER/ADMIN: HCPCS | Performed by: FAMILY MEDICINE

## 2021-02-09 PROCEDURE — 99204 OFFICE O/P NEW MOD 45 MIN: CPT | Performed by: FAMILY MEDICINE

## 2021-02-09 PROCEDURE — 1123F ACP DISCUSS/DSCN MKR DOCD: CPT | Performed by: FAMILY MEDICINE

## 2021-02-09 PROCEDURE — G8427 DOCREV CUR MEDS BY ELIG CLIN: HCPCS | Performed by: FAMILY MEDICINE

## 2021-02-09 RX ORDER — M-VIT,TX,IRON,MINS/CALC/FOLIC 27MG-0.4MG
1 TABLET ORAL DAILY
COMMUNITY

## 2021-02-09 ASSESSMENT — PATIENT HEALTH QUESTIONNAIRE - PHQ9
1. LITTLE INTEREST OR PLEASURE IN DOING THINGS: 0
SUM OF ALL RESPONSES TO PHQ9 QUESTIONS 1 & 2: 0
2. FEELING DOWN, DEPRESSED OR HOPELESS: 0

## 2021-02-09 ASSESSMENT — ENCOUNTER SYMPTOMS
CHEST TIGHTNESS: 0
SHORTNESS OF BREATH: 0
BACK PAIN: 0
CONSTIPATION: 0
RHINORRHEA: 0
DIARRHEA: 0

## 2021-02-09 NOTE — PROGRESS NOTES
SUBJECTIVE:    Chan Ladd is a 72 y.o. male who presents as a new patient. Chief Complaint   Patient presents with   Starr Bains Former Dr. Iliana Boyle patient   Last saw Dr. Iliana Boyle in May 2020 through a virtual visit. Patient relates that he was diagnosed with diabetes number years ago. At that time he was placed on Metformin. He started eating better and exercising and his A1c decrease significantly and his Metformin was stopped. He has not had recent A1c. Hyperlipidemia  This is a chronic problem. The current episode started more than 1 year ago. The problem is controlled. Exacerbating diseases include obesity. Pertinent negatives include no chest pain or shortness of breath. Current antihyperlipidemic treatment includes statins. The current treatment provides significant improvement of lipids. Compliance problems include adherence to diet and adherence to exercise. Risk factors for coronary artery disease include dyslipidemia, hypertension, male sex, obesity and a sedentary lifestyle. Hypertension  This is a chronic problem. The current episode started more than 1 year ago. The problem is controlled. Pertinent negatives include no chest pain, palpitations or shortness of breath. Risk factors for coronary artery disease include male gender, dyslipidemia and obesity. Past treatments include ACE inhibitors. The current treatment provides significant improvement. Compliance problems include exercise and diet.          Past Medical History:   Diagnosis Date    Hyperlipidemia     Hypertension     Type II or unspecified type diabetes mellitus without mention of complication, not stated as uncontrolled      Past Surgical History:   Procedure Laterality Date    SHOULDER SURGERY Right 10/30/2013     Family History   Problem Relation Age of Onset    Breast Cancer Mother     Heart Attack Mother     Diabetes Maternal Aunt      Social History     Tobacco Use  Smoking status: Never Smoker    Smokeless tobacco: Never Used   Substance Use Topics    Alcohol use: No      Allergies   Allergen Reactions    No Known Allergies      Current Outpatient Medications on File Prior to Visit   Medication Sig Dispense Refill    Multiple Vitamins-Minerals (THERAPEUTIC MULTIVITAMIN-MINERALS) tablet Take 1 tablet by mouth daily      simvastatin (ZOCOR) 40 MG tablet TAKE 1 TABLET BY MOUTH EVERY DAY EVERY NIGHT 90 tablet 3    lisinopril (PRINIVIL;ZESTRIL) 10 MG tablet TAKE 1 TABLET BY MOUTH EVERY DAY 90 tablet 3    Cholecalciferol (VITAMIN D3) 1000 units TABS Take 1 tablet by mouth daily 30 tablet 0     No current facility-administered medications on file prior to visit. Review of Systems   Constitutional: Negative for activity change, fatigue and unexpected weight change. HENT: Negative for congestion, postnasal drip and rhinorrhea. Respiratory: Negative for chest tightness and shortness of breath. Cardiovascular: Negative for chest pain and palpitations. Gastrointestinal: Negative for constipation and diarrhea. Genitourinary: Negative for difficulty urinating. Musculoskeletal: Negative for arthralgias and back pain. Neurological: Negative for dizziness and light-headedness. Psychiatric/Behavioral: Negative for dysphoric mood and sleep disturbance. The patient is not nervous/anxious. OBJECTIVE:    /80   Temp 97.3 °F (36.3 °C)   Ht 6' (1.829 m)   Wt 234 lb (106.1 kg)   BMI 31.74 kg/m²    Physical Exam  Constitutional:       Appearance: Normal appearance. He is well-developed. HENT:      Head: Normocephalic and atraumatic. Right Ear: Tympanic membrane and external ear normal.      Left Ear: Tympanic membrane and external ear normal.      Nose: Nose normal.      Mouth/Throat:      Mouth: Mucous membranes are moist.      Pharynx: No posterior oropharyngeal erythema. Eyes:      General:         Right eye: No discharge. Conjunctiva/sclera: Conjunctivae normal.   Neck:      Musculoskeletal: Normal range of motion and neck supple. Thyroid: No thyromegaly. Vascular: No JVD. Trachea: No tracheal deviation. Cardiovascular:      Rate and Rhythm: Normal rate and regular rhythm. Heart sounds: Normal heart sounds. Pulmonary:      Effort: Pulmonary effort is normal. No respiratory distress. Breath sounds: Normal breath sounds. No rales. Lymphadenopathy:      Cervical: No cervical adenopathy. Skin:     General: Skin is warm and dry. Neurological:      General: No focal deficit present. Mental Status: He is alert and oriented to person, place, and time. Psychiatric:         Mood and Affect: Mood normal.         Behavior: Behavior normal.         ASSESSMENT/PLAN:    Linette Peña was seen today for establish care. Diagnoses and all orders for this visit:    Hyperglycemia  -     Hemoglobin A1C; Future    Mixed hyperlipidemia  Lipids have been in good control since starting simvastatin. -     Comprehensive Metabolic Panel, Fasting; Future  -     CBC Auto Differential; Future  -     Lipid, Fasting; Future  -     TSH with Reflex; Future    Essential hypertension  Good control on current medications    Screening for malignant neoplasm of prostate  -     PSA screening; Future    Need for Tdap vaccination  -     Tdap (age 6y and older) IM (Boostrix)        Return in about 3 months (around 5/9/2021). Please note portions of this note were completed with a voice recognition program.  Efforts were made to edit the dictations but occasionally words are mis-transcribed.

## 2021-04-24 ENCOUNTER — HOSPITAL ENCOUNTER (OUTPATIENT)
Age: 71
Discharge: HOME | End: 2021-04-24
Payer: MEDICARE

## 2021-04-24 DIAGNOSIS — E78.2 MIXED HYPERLIPIDEMIA: ICD-10-CM

## 2021-04-24 DIAGNOSIS — Z12.5 SCREENING FOR MALIGNANT NEOPLASM OF PROSTATE: ICD-10-CM

## 2021-04-24 DIAGNOSIS — R73.9 HYPERGLYCEMIA: ICD-10-CM

## 2021-04-24 LAB
A/G RATIO: 1.5 (ref 1.1–2.2)
ALBUMIN SERPL-MCNC: 4.4 G/DL (ref 3.4–5)
ALP BLD-CCNC: 60 U/L (ref 40–129)
ALT SERPL-CCNC: 28 U/L (ref 10–40)
ANION GAP SERPL CALCULATED.3IONS-SCNC: 10 MMOL/L (ref 3–16)
AST SERPL-CCNC: 23 U/L (ref 15–37)
BASOPHILS ABSOLUTE: 0 K/UL (ref 0–0.2)
BASOPHILS RELATIVE PERCENT: 0.7 %
BILIRUB SERPL-MCNC: 0.7 MG/DL (ref 0–1)
BUN BLDV-MCNC: 13 MG/DL (ref 7–20)
CALCIUM SERPL-MCNC: 9 MG/DL (ref 8.3–10.6)
CHLORIDE BLD-SCNC: 104 MMOL/L (ref 99–110)
CHOLESTEROL, FASTING: 138 MG/DL (ref 0–199)
CO2: 25 MMOL/L (ref 21–32)
CREAT SERPL-MCNC: 0.8 MG/DL (ref 0.8–1.3)
EOSINOPHILS ABSOLUTE: 0.3 K/UL (ref 0–0.6)
EOSINOPHILS RELATIVE PERCENT: 5.7 %
GFR AFRICAN AMERICAN: >60
GFR NON-AFRICAN AMERICAN: >60
GLOBULIN: 3 G/DL
GLUCOSE FASTING: 161 MG/DL (ref 70–99)
HCT VFR BLD CALC: 43.4 % (ref 40.5–52.5)
HDLC SERPL-MCNC: 51 MG/DL (ref 40–60)
HEMOGLOBIN: 14.9 G/DL (ref 13.5–17.5)
LDL CHOLESTEROL CALCULATED: 65 MG/DL
LYMPHOCYTES ABSOLUTE: 1.5 K/UL (ref 1–5.1)
LYMPHOCYTES RELATIVE PERCENT: 26.2 %
MCH RBC QN AUTO: 31.2 PG (ref 26–34)
MCHC RBC AUTO-ENTMCNC: 34.3 G/DL (ref 31–36)
MCV RBC AUTO: 90.8 FL (ref 80–100)
MONOCYTES ABSOLUTE: 0.7 K/UL (ref 0–1.3)
MONOCYTES RELATIVE PERCENT: 12.2 %
NEUTROPHILS ABSOLUTE: 3.3 K/UL (ref 1.7–7.7)
NEUTROPHILS RELATIVE PERCENT: 55.2 %
PDW BLD-RTO: 13.4 % (ref 12.4–15.4)
PLATELET # BLD: 189 K/UL (ref 135–450)
PMV BLD AUTO: 9.8 FL (ref 5–10.5)
POTASSIUM SERPL-SCNC: 4.5 MMOL/L (ref 3.5–5.1)
PROSTATE SPECIFIC ANTIGEN: 4.95 NG/ML (ref 0–4)
RBC # BLD: 4.78 M/UL (ref 4.2–5.9)
SODIUM BLD-SCNC: 139 MMOL/L (ref 136–145)
TOTAL PROTEIN: 7.4 G/DL (ref 6.4–8.2)
TRIGLYCERIDE, FASTING: 110 MG/DL (ref 0–150)
TSH REFLEX: 1.6 UIU/ML (ref 0.27–4.2)
VLDLC SERPL CALC-MCNC: 22 MG/DL
WBC # BLD: 5.9 K/UL (ref 4–11)

## 2021-04-24 PROCEDURE — 80053 COMPREHEN METABOLIC PANEL: CPT

## 2021-04-24 PROCEDURE — 80061 LIPID PANEL: CPT

## 2021-04-24 PROCEDURE — 36415 COLL VENOUS BLD VENIPUNCTURE: CPT

## 2021-04-24 PROCEDURE — 85025 COMPLETE CBC W/AUTO DIFF WBC: CPT

## 2021-04-24 PROCEDURE — 83036 HEMOGLOBIN GLYCOSYLATED A1C: CPT

## 2021-04-24 PROCEDURE — 84153 ASSAY OF PSA TOTAL: CPT

## 2021-04-24 PROCEDURE — G0103 PSA SCREENING: HCPCS

## 2021-04-24 PROCEDURE — 84443 ASSAY THYROID STIM HORMONE: CPT

## 2021-04-25 LAB
ESTIMATED AVERAGE GLUCOSE: 159.9 MG/DL
HBA1C MFR BLD: 7.2 %

## 2021-05-10 PROBLEM — E11.9 TYPE 2 DIABETES MELLITUS WITHOUT COMPLICATION, WITHOUT LONG-TERM CURRENT USE OF INSULIN (HCC): Status: ACTIVE | Noted: 2021-05-10

## 2021-05-10 NOTE — PROGRESS NOTES
Past treatments include ACE inhibitors. The current treatment provides significant improvement. Compliance problems include exercise and diet. Patient's medications, allergies, past medical,surgical, social and family histories were reviewed and updated as appropriate. Past Medical History:   Diagnosis Date    Hyperlipidemia     Hypertension     Type II or unspecified type diabetes mellitus without mention of complication, not stated as uncontrolled      Past Surgical History:   Procedure Laterality Date    FOOT SURGERY      repair of fallen arch and tendon repair    KNEE ARTHROSCOPY      Both knees R 2007, L 2017    SHOULDER SURGERY Right 10/30/2013     Family History   Problem Relation Age of Onset    Breast Cancer Mother     Heart Attack Mother     Diabetes Maternal Aunt      Social History     Tobacco Use    Smoking status: Never Smoker    Smokeless tobacco: Never Used   Substance Use Topics    Alcohol use: No      Allergies   Allergen Reactions    No Known Allergies      Current Outpatient Medications on File Prior to Visit   Medication Sig Dispense Refill    Multiple Vitamins-Minerals (THERAPEUTIC MULTIVITAMIN-MINERALS) tablet Take 1 tablet by mouth daily      simvastatin (ZOCOR) 40 MG tablet TAKE 1 TABLET BY MOUTH EVERY DAY EVERY NIGHT 90 tablet 3    lisinopril (PRINIVIL;ZESTRIL) 10 MG tablet TAKE 1 TABLET BY MOUTH EVERY DAY 90 tablet 3    Cholecalciferol (VITAMIN D3) 1000 units TABS Take 1 tablet by mouth daily 30 tablet 0     No current facility-administered medications on file prior to visit. Review of Systems   Constitutional: Negative for activity change, fatigue and unexpected weight change. HENT: Negative for congestion, postnasal drip and rhinorrhea. Eyes: Negative for itching. Respiratory: Negative for chest tightness and shortness of breath. Cardiovascular: Negative for chest pain and palpitations.    Gastrointestinal: Negative for constipation and diarrhea. Genitourinary: Negative for difficulty urinating. Musculoskeletal: Positive for arthralgias (knees). Negative for back pain. Neurological: Negative for dizziness and light-headedness. Psychiatric/Behavioral: Negative for dysphoric mood and sleep disturbance. The patient is not nervous/anxious. OBJECTIVE:    /80   Temp 97.9 °F (36.6 °C)   Wt 239 lb (108.4 kg)   BMI 32.41 kg/m²    Physical Exam  Constitutional:       General: He is not in acute distress. Appearance: He is well-developed. Neck:      Vascular: No carotid bruit. Cardiovascular:      Rate and Rhythm: Normal rate and regular rhythm. Pulses:           Dorsalis pedis pulses are 2+ on the right side and 2+ on the left side. Posterior tibial pulses are 2+ on the right side and 2+ on the left side. Heart sounds: Normal heart sounds. No murmur. No friction rub. No gallop. Pulmonary:      Effort: Pulmonary effort is normal.      Breath sounds: Normal breath sounds. Musculoskeletal: Normal range of motion. Right lower leg: No edema. Left lower leg: No edema. Right foot: Normal.      Left foot: Normal.   Neurological:      Mental Status: He is alert and oriented to person, place, and time. Sensory: Sensation is intact. Motor: Motor function is intact. Deep Tendon Reflexes: Reflexes are normal and symmetric. Comments: 12 point monofilament test normal    Psychiatric:         Behavior: Behavior is cooperative. ASSESSMENT/PLAN:    Nataly Sweet was seen today for follow-up. Diagnoses and all orders for this visit:    Essential hypertension  Excellent control on current medications    Pure hypercholesterolemia  LDL is at goal which is less than 70. Type 2 diabetes mellitus without complication, without long-term current use of insulin (HCC)  -     metFORMIN (GLUCOPHAGE-XR) 500 MG extended release tablet;  Take 2 tablets by mouth daily (with breakfast)  - Hemoglobin A1C; Future  -     Basic Metabolic Panel, Fasting; Future  -     Diabetic Foot Examination    Handouts given with the after visit summary      Return in about 3 months (around 8/11/2021). Please note portions of this note were completed with a voicerecognition program.  Efforts were made to edit the dictations but occasionally words are mis-transcribed.

## 2021-05-11 ENCOUNTER — OFFICE VISIT (OUTPATIENT)
Dept: FAMILY MEDICINE CLINIC | Age: 71
End: 2021-05-11
Payer: MEDICARE

## 2021-05-11 VITALS
DIASTOLIC BLOOD PRESSURE: 80 MMHG | BODY MASS INDEX: 32.41 KG/M2 | SYSTOLIC BLOOD PRESSURE: 120 MMHG | TEMPERATURE: 97.9 F | WEIGHT: 239 LBS

## 2021-05-11 DIAGNOSIS — I10 ESSENTIAL HYPERTENSION: Primary | Chronic | ICD-10-CM

## 2021-05-11 DIAGNOSIS — E78.00 PURE HYPERCHOLESTEROLEMIA: ICD-10-CM

## 2021-05-11 DIAGNOSIS — E11.9 TYPE 2 DIABETES MELLITUS WITHOUT COMPLICATION, WITHOUT LONG-TERM CURRENT USE OF INSULIN (HCC): ICD-10-CM

## 2021-05-11 PROCEDURE — 3017F COLORECTAL CA SCREEN DOC REV: CPT | Performed by: FAMILY MEDICINE

## 2021-05-11 PROCEDURE — G8427 DOCREV CUR MEDS BY ELIG CLIN: HCPCS | Performed by: FAMILY MEDICINE

## 2021-05-11 PROCEDURE — G8417 CALC BMI ABV UP PARAM F/U: HCPCS | Performed by: FAMILY MEDICINE

## 2021-05-11 PROCEDURE — 1123F ACP DISCUSS/DSCN MKR DOCD: CPT | Performed by: FAMILY MEDICINE

## 2021-05-11 PROCEDURE — 99214 OFFICE O/P EST MOD 30 MIN: CPT | Performed by: FAMILY MEDICINE

## 2021-05-11 PROCEDURE — 2022F DILAT RTA XM EVC RTNOPTHY: CPT | Performed by: FAMILY MEDICINE

## 2021-05-11 PROCEDURE — 3051F HG A1C>EQUAL 7.0%<8.0%: CPT | Performed by: FAMILY MEDICINE

## 2021-05-11 PROCEDURE — 4040F PNEUMOC VAC/ADMIN/RCVD: CPT | Performed by: FAMILY MEDICINE

## 2021-05-11 PROCEDURE — 1036F TOBACCO NON-USER: CPT | Performed by: FAMILY MEDICINE

## 2021-05-11 RX ORDER — METFORMIN HYDROCHLORIDE 500 MG/1
1000 TABLET, EXTENDED RELEASE ORAL
Qty: 180 TABLET | Refills: 2 | Status: SHIPPED | OUTPATIENT
Start: 2021-05-11 | End: 2021-11-26

## 2021-05-11 ASSESSMENT — ENCOUNTER SYMPTOMS
BACK PAIN: 0
CHEST TIGHTNESS: 0
SHORTNESS OF BREATH: 0
EYE ITCHING: 0
DIARRHEA: 0
RHINORRHEA: 0
CONSTIPATION: 0

## 2021-05-11 NOTE — PATIENT INSTRUCTIONS
Patient Education        Noninsulin Medicines for Type 2 Diabetes: Care Instructions  Overview     There are different types of noninsulin medicines for diabetes. Each works in a different way. But they all help you control your blood sugar. Some types help your body make insulin to lower your blood sugar. Others lower how much insulin your body needs. Some can slow how fast your body digests sugars. And some can remove extra glucose through your urine. You may need to take more than one medicine for diabetes. Two or more medicines may work better to lower your blood sugar level than just one does. · Metformin. This lowers how much glucose your liver makes. And it helps you respond better to insulin. It also lowers the amount of stored sugar that your liver releases when you are not eating. · Sulfonylureas. These help your body release more insulin. Some work for many hours. They can cause low blood sugar if you don't eat as you planned. An example is glipizide. · Thiazolidinediones. These reduce the amount of blood glucose. They also help you respond better to insulin. An example is pioglitazone. · SGLT2 inhibitors. These help to remove extra glucose through your urine. They may also help some people lose weight. An example is ertugliflozin. · DPP-4 inhibitors. These help your body raise the level of insulin after you eat. They also help your body make less of a hormone that raises blood sugar. An example is alogliptin. · Incretin hormones (GLP-1 receptor agonists). These help your body make a protein that can raise your insulin level and make you less hungry. They're given as shots or pills. An example is semaglutide. · Meglitinides. These help your body release insulin. They also help slow how your body digests sugars. So they can keep your blood sugar from rising too fast after you eat. · Alpha-glucosidase inhibitors. These keep starches from breaking down.  This means that they lower the amount of Information box to learn more about \"Noninsulin Medicines for Type 2 Diabetes: Care Instructions. \"     If you do not have an account, please click on the \"Sign Up Now\" link. Current as of: August 31, 2020               Content Version: 12.8  © 2006-2021 Healthwise, Incorporated. Care instructions adapted under license by [facility] ([facility]). If you have questions about a medical condition or this instruction, always ask your healthcare professional. [address] any warranty or liability for your use of this information. Patient Education        Learning About Meal Planning for Diabetes  Why plan your meals? Meal planning can be a key part of managing diabetes. Planning meals and snacks with the right balance of carbohydrate, protein, and fat can help you keep your blood sugar at the target level you set with your doctor. You don't have to eat special foods. You can eat what your family eats, including sweets once in a while. But you do have to pay attention to how often you eat and how much you eat of certain foods. You may want to work with a dietitian or a certified diabetes educator. He or she can give you tips and meal ideas and can answer your questions about meal planning. This health professional can also help you reach a healthy weight if that is one of your goals. What plan is right for you? Your dietitian or diabetes educator may suggest that you start with the plate format or carbohydrate counting. The plate format  The plate format is a simple way to help you manage how you eat. You plan meals by learning how much space each food should take on a plate. Using the plate format helps you spread carbohydrate throughout the day. It can make it easier to keep your blood sugar level within your target range. It also helps you see if you're eating healthy portion sizes. To use the plate format, you put non-starchy vegetables on half your plate.  Add meat or meat substitutes on one-quarter of the plate. Put a grain or starchy vegetable (such as brown rice or a potato) on the final quarter of the plate. You can add a small piece of fruit and some low-fat or fat-free milk or yogurt, depending on your carbohydrate goal for each meal.  Here are some tips for using the plate format:  · Make sure that you are not using an oversized plate. A 9-inch plate is best. Many restaurants use larger plates. · Get used to using the plate format at home. Then you can use it when you eat out. · Write down your questions about using the plate format. Talk to your doctor, a dietitian, or a diabetes educator about your concerns. Carbohydrate counting  With carbohydrate counting, you plan meals based on the amount of carbohydrate in each food. Carbohydrate raises blood sugar higher and more quickly than any other nutrient. It is found in desserts, breads and cereals, and fruit. It's also found in starchy vegetables such as potatoes and corn, grains such as rice and pasta, and milk and yogurt. Spreading carbohydrate throughout the day helps keep your blood sugar levels within your target range. Your daily amount depends on several things, including your weight, how active you are, which diabetes medicines you take, and what your goals are for your blood sugar levels. A registered dietitian or diabetes educator can help you plan how much carbohydrate to include in each meal and snack. A guideline for your daily amount of carbohydrate is:  · 45 to 60 grams at each meal. That's about the same as 3 to 4 carbohydrate servings. · 15 to 20 grams at each snack. That's about the same as 1 carbohydrate serving. The Nutrition Facts label on packaged foods tells you how much carbohydrate is in a serving of the food. First, look at the serving size on the food label. Is that the amount you eat in a serving? All of the nutrition information on a food label is based on that serving size.  So if you eat more or less than that, you'll need to adjust the other numbers. Total carbohydrate is the next thing you need to look for on the label. If you count carbohydrate servings, one serving of carbohydrate is 15 grams. For foods that don't come with labels, such as fresh fruits and vegetables, you'll need a guide that lists carbohydrate in these foods. Ask your doctor, dietitian, or diabetes educator about books or other nutrition guides you can use. If you take insulin, you need to know how many grams of carbohydrate are in a meal. This lets you know how much rapid-acting insulin to take before you eat. If you use an insulin pump, you get a constant rate of insulin during the day. So the pump must be programmed at meals to give you extra insulin to cover the rise in blood sugar after meals. When you know how much carbohydrate you will eat, you can take the right amount of insulin. Or, if you always use the same amount of insulin, you need to make sure that you eat the same amount of carbohydrate at meals. If you need more help to understand carbohydrate counting and food labels, ask your doctor, dietitian, or diabetes educator. How can you plan healthy meals? Here are some tips to get started:  · Plan your meals a week at a time. Don't forget to include snacks too. · Use cookbooks or online recipes to plan several main meals. Plan some quick meals for busy nights. You also can double some recipes that freeze well. Then you can save half for other busy nights when you don't have time to cook. · Make sure you have the ingredients you need for your recipes. If you're running low on basic items, put these items on your shopping list too. · List foods that you use to make breakfasts, lunches, and snacks. List plenty of fruits and vegetables. · Post this list on the refrigerator. Add to it as you think of more things you need. · Take the list to the store to do your weekly shopping.   Follow-up care is a key part of your treatment and safety. Be sure to make and go to all appointments, and call your doctor if you are having problems. It's also a good idea to know your test results and keep a list of the medicines you take. Where can you learn more? Go to https://analilia.Postify. org and sign in to your ZIMPER[employer]UM account. Enter U674 in the Solio box to learn more about \"Learning About Meal Planning for Diabetes. \"     If you do not have an account, please click on the \"Sign Up Now\" link. Current as of: August 31, 2020               Content Version: 12.8  © 2006-2021 [employer]. Care instructions adapted under license by [facility] ([facility]). If you have questions about a medical condition or this instruction, always ask your healthcare professional. [address] any warranty or liability for your use of this information. Patient Education        Learning About Carbohydrate (Carb) Counting and Eating Out When You Have Diabetes  Why plan your meals? Meal planning can be a key part of managing diabetes. Planning meals and snacks with the right balance of carbohydrate, protein, and fat can help you keep your blood sugar at the target level you set with your doctor. You don't have to eat special foods. You can eat what your family eats, including sweets once in a while. But you do have to pay attention to how often you eat and how much you eat of certain foods. You may want to work with a dietitian or a certified diabetes educator. He or she can give you tips and meal ideas and can answer your questions about meal planning. This health professional can also help you reach a healthy weight if that is one of your goals. What should you know about eating carbs? Managing the amount of carbohydrate (carbs) you eat is an important part of healthy meals when you have diabetes. Carbohydrate is found in many foods. · Learn which foods have carbs.  And learn the amounts of carbs in different foods. ? Bread, cereal, pasta, and rice have about 15 grams of carbs in a serving. A serving is 1 slice of bread (1 ounce), ½ cup of cooked cereal, or 1/3 cup of cooked pasta or rice. ? Fruits have 15 grams of carbs in a serving. A serving is 1 small fresh fruit, such as an apple or orange; ½ of a banana; ½ cup of cooked or canned fruit; ½ cup of fruit juice; 1 cup of melon or raspberries; or 2 tablespoons of dried fruit. ? Milk and no-sugar-added yogurt have 15 grams of carbs in a serving. A serving is 1 cup of milk or 2/3 cup of no-sugar-added yogurt. ? Starchy vegetables have 15 grams of carbs in a serving. A serving is ½ cup of mashed potatoes or sweet potato; 1 cup winter squash; ½ of a small baked potato; ½ cup of cooked beans; or ½ cup cooked corn or green peas. · Learn how much carbs to eat each day and at each meal. A dietitian or CDE can teach you how to keep track of the amount of carbs you eat. This is called carbohydrate counting. · If you are not sure how to count carbohydrate grams, use the Plate Method to plan meals. It is a good, quick way to make sure that you have a balanced meal. It also helps you spread carbs throughout the day. ? Divide your plate by types of foods. Put non-starchy vegetables on half the plate, meat or other protein food on one-quarter of the plate, and a grain or starchy vegetable in the final quarter of the plate. To this you can add a small piece of fruit and 1 cup of milk or yogurt, depending on how many carbs you are supposed to eat at a meal.  · Try to eat about the same amount of carbs at each meal. Do not \"save up\" your daily allowance of carbs to eat at one meal.  · Proteins have very little or no carbs per serving. Examples of proteins are beef, chicken, turkey, fish, eggs, tofu, cheese, cottage cheese, and peanut butter. A serving size of meat is 3 ounces, which is about the size of a deck of cards.  Examples of meat substitute serving sizes (equal to 1 ounce of meat) are 1/4 cup of cottage cheese, 1 egg, 1 tablespoon of peanut butter, and ½ cup of tofu. How can you eat out and still eat healthy? · Learn to estimate the serving sizes of foods that have carbohydrate. If you measure food at home, it will be easier to estimate the amount in a serving of restaurant food. · If the meal you order has too much carbohydrate (such as potatoes, corn, or baked beans), ask to have a low-carbohydrate food instead. Ask for a salad or green vegetables. · If you use insulin, check your blood sugar before and after eating out to help you plan how much to eat in the future. · If you eat more carbohydrate at a meal than you had planned, take a walk or do other exercise. This will help lower your blood sugar. What are some tips for eating healthy? · Limit saturated fat, such as the fat from meat and dairy products. This is a healthy choice because people who have diabetes are at higher risk of heart disease. So choose lean cuts of meat and nonfat or low-fat dairy products. Use olive or canola oil instead of butter or shortening when cooking. · Don't skip meals. Your blood sugar may drop too low if you skip meals and take insulin or certain medicines for diabetes. · Check with your doctor before you drink alcohol. Alcohol can cause your blood sugar to drop too low. Alcohol can also cause a bad reaction if you take certain diabetes medicines. Follow-up care is a key part of your treatment and safety. Be sure to make and go to all appointments, and call your doctor if you are having problems. It's also a good idea to know your test results and keep a list of the medicines you take. Where can you learn more? Go to https://analilia.healthRunMyProcess. org and sign in to your [employer] account. Enter D560 in the [facility] box to learn more about \"Learning About Carbohydrate (Carb) Counting and Eating Out When You Have Diabetes. \"     If you do not have an account, please click on the \"Sign Up Now\" link. Current as of: August 31, 2020               Content Version: 12.8  © 2006-2021 [employer]. Care instructions adapted under license by [employer]. If you have questions about a medical condition or this instruction, always ask your healthcare professional. [address] any warranty or liability for your use of this information. Patient Education        Diabetes Foot Health: Care Instructions  Your Care Instructions     When you have diabetes, your feet need extra care and attention. Diabetes can damage the nerve endings and blood vessels in your feet, making you less likely to notice when your feet are injured. Diabetes also limits your body's ability to fight infection and get blood to areas that need it. If you get a minor foot injury, it could become an ulcer or a serious infection. With good foot care, you can prevent most of these problems. Caring for your feet can be quick and easy. Most of the care can be done when you are bathing or getting ready for bed. Follow-up care is a key part of your treatment and safety. Be sure to make and go to all appointments, and call your doctor if you are having problems. It's also a good idea to know your test results and keep a list of the medicines you take. How can you care for yourself at home? · Keep your blood sugar close to normal by watching what and how much you eat, monitoring blood sugar, taking medicines if prescribed, and getting regular exercise. · Do not smoke. Smoking affects blood flow and can make foot problems worse. If you need help quitting, talk to your doctor about stop-smoking programs and medicines. These can increase your chances of quitting for good. · Eat a diet that is low in fats. High fat intake can cause fat to build up in your blood vessels and decrease blood flow. · Inspect your feet daily for blisters, cuts, cracks, or sores.  If you cannot see well, use a mirror or have someone help you. · Take care of your feet:  ? Wash your feet every day. Use warm (not hot) water. Check the water temperature with your wrists or other part of your body, not your feet. ? Dry your feet well. Pat them dry. Do not rub the skin on your feet too hard. Dry well between your toes. If the skin on your feet stays moist, bacteria or a fungus can grow, which can lead to infection. ? Keep your skin soft. Use moisturizing skin cream to keep the skin on your feet soft and prevent calluses and cracks. But do not put the cream between your toes, and stop using any cream that causes a rash. ? Clean underneath your toenails carefully. Do not use a sharp object to clean underneath your toenails. Use the blunt end of a nail file or other rounded tool. ? Trim and file your toenails straight across to prevent ingrown toenails. Use a nail clipper, not scissors. Use an emery board to smooth the edges. · Change socks daily. Socks without seams are best, because seams often rub the feet. You can find socks for people with diabetes from specialty catalogs. · Look inside your shoes every day for things like gravel or torn linings, which could cause blisters or sores. · Buy shoes that fit well:  ? Look for shoes that have plenty of space around the toes. This helps prevent bunions and blisters. ? Try on shoes while wearing the kind of socks you will usually wear with the shoes. ? Avoid plastic shoes. They may rub your feet and cause blisters. Good shoes should be made of materials that are flexible and breathable, such as leather or cloth. ? Break in new shoes slowly by wearing them for no more than an hour a day for several days. Take extra time to check your feet for red areas, blisters, or other problems after you wear new shoes. · Do not go barefoot. Do not wear sandals, and do not wear shoes with very thin soles. Thin soles are easy to puncture.  They also do not protect your feet from hot pavement or cold weather. · Have your doctor check your feet during each visit. If you have a foot problem, see your doctor. Do not try to treat an early foot problem at home. Home remedies or treatments that you can buy without a prescription (such as corn removers) can be harmful. · Always get early treatment for foot problems. A minor irritation can lead to a major problem if not properly cared for early. When should you call for help? Call your doctor now or seek immediate medical care if:    · You have a foot sore, an ulcer or break in the skin that is not healing after 4 days, bleeding corns or calluses, or an ingrown toenail.     · You have blue or black areas, which can mean bruising or blood flow problems.     · You have peeling skin or tiny blisters between your toes or cracking or oozing of the skin.     · You have a fever for more than 24 hours and a foot sore.     · You have new numbness or tingling in your feet that does not go away after you move your feet or change positions.     · You have unexplained or unusual swelling of the foot or ankle. Watch closely for changes in your health, and be sure to contact your doctor if:    · You cannot do proper foot care. Where can you learn more? Go to https://InformedDNA.THUBIT. org and sign in to your Edgeio account. Enter A951 in the [facility] box to learn more about \"Diabetes Foot Health: Care Instructions. \"     If you do not have an account, please click on the \"Sign Up Now\" link. Current as of: August 31, 2020               Content Version: 12.8  © 5115-3000 Healthwise, Incorporated. Care instructions adapted under license by [facility] ([facility]). If you have questions about a medical condition or this instruction, always ask your healthcare professional. [place] [address] any warranty or liability for your use of this information.          Patient Education        Learning About the Risk of Heart Attack and Stroke With Diabetes  How are diabetes, heart attack, and stroke connected? For some people, diabetes can cause problems that increase the risk of a heart attack or stroke. Many things can lead to a heart attack or stroke. These include high blood sugar, insulin resistance, high cholesterol, and high blood pressure. Lifestyle and genetics may also play a part. But here's the good news: The things you're doing to stay healthy with diabetes also help your heart and blood vessels. That means eating healthy foods, quitting smoking, and getting exercise. What increases your risk for heart attack and stroke? When you have diabetes, your risk for heart attack and stroke is even higher if you have:  · High blood pressure. It pushes blood through the arteries with too much force. Over time, this damages the walls of the arteries. · High cholesterol. It causes the buildup of a kind of fat inside the blood vessel walls. This buildup can lower blood flow to the heart muscle and raise your risk for having a heart attack or stroke. · Kidney damage. It shares many of the risk factors for heart attack and stroke (such as high blood sugar, high blood pressure, and high cholesterol). How do you keep your heart healthy when you have diabetes? Managing your diabetes and keeping your heart and blood vessels healthy are both important. Here are some things you can do. · Test your blood sugar levels and get your diabetes tests on schedule. Try to keep your numbers within your target range. · Keep track of your blood pressure. Your doctor will give you a goal that's right for you. If your blood pressure is high, your treatment may also include medicine. Changes in your lifestyle, such as staying at a healthy weight, may also help you lower your blood pressure. · Eat heart-healthy foods. These include fruits, vegetables, whole grains, fish, and low-fat or nonfat dairy foods.  Limit sodium, alcohol, and sweets. · If your doctor recommends it, get more exercise. Walking is a good choice. Bit by bit, increase the amount you walk every day. Try for at least 30 minutes on most days of the week. · Don't smoke. Smoking can make diabetes worse and increase your risk of heart attack or stroke. If you need help quitting, talk to your doctor about stop-smoking programs and medicines. These can increase your chances of quitting for good. · Think about taking medicines for your heart. For example, your doctor may suggest taking a statin or daily aspirin. Where can you learn more? Go to https://Reveepepiceweb.Touch of Life Technologies. org and sign in to your Meeps account. Enter Y687 in the [employer] box to learn more about \"Learning About the Risk of Heart Attack and Stroke With Diabetes. \"     If you do not have an account, please click on the \"Sign Up Now\" link. Current as of: August 31, 2020               Content Version: 12.8  © 2006-2021 Healthwise, Incorporated. Care instructions adapted under license by [facility] ([facility]). If you have questions about a medical condition or this instruction, always ask your healthcare professional. [place] [address] any warranty or liability for your use of this information.

## 2021-08-07 ENCOUNTER — HOSPITAL ENCOUNTER (OUTPATIENT)
Age: 71
Discharge: HOME | End: 2021-08-07
Payer: MEDICARE

## 2021-08-07 DIAGNOSIS — E11.9 TYPE 2 DIABETES MELLITUS WITHOUT COMPLICATION, WITHOUT LONG-TERM CURRENT USE OF INSULIN (HCC): ICD-10-CM

## 2021-08-07 LAB
ANION GAP SERPL CALCULATED.3IONS-SCNC: 10 MMOL/L (ref 3–16)
BUN BLDV-MCNC: 13 MG/DL (ref 7–20)
CALCIUM SERPL-MCNC: 8.8 MG/DL (ref 8.3–10.6)
CHLORIDE BLD-SCNC: 103 MMOL/L (ref 99–110)
CO2: 23 MMOL/L (ref 21–32)
CREAT SERPL-MCNC: 0.8 MG/DL (ref 0.8–1.3)
GFR AFRICAN AMERICAN: >60
GFR NON-AFRICAN AMERICAN: >60
GLUCOSE FASTING: 124 MG/DL (ref 70–99)
POTASSIUM SERPL-SCNC: 4.7 MMOL/L (ref 3.5–5.1)
SODIUM BLD-SCNC: 136 MMOL/L (ref 136–145)

## 2021-08-07 PROCEDURE — 80048 BASIC METABOLIC PNL TOTAL CA: CPT

## 2021-08-07 PROCEDURE — 36415 COLL VENOUS BLD VENIPUNCTURE: CPT

## 2021-08-07 PROCEDURE — 83036 HEMOGLOBIN GLYCOSYLATED A1C: CPT

## 2021-08-08 LAB
ESTIMATED AVERAGE GLUCOSE: 148.5 MG/DL
HBA1C MFR BLD: 6.8 %

## 2021-08-18 ENCOUNTER — OFFICE VISIT (OUTPATIENT)
Dept: FAMILY MEDICINE CLINIC | Age: 71
End: 2021-08-18
Payer: MEDICARE

## 2021-08-18 VITALS
HEIGHT: 72 IN | SYSTOLIC BLOOD PRESSURE: 110 MMHG | DIASTOLIC BLOOD PRESSURE: 70 MMHG | WEIGHT: 230 LBS | BODY MASS INDEX: 31.15 KG/M2 | TEMPERATURE: 98.2 F

## 2021-08-18 DIAGNOSIS — Z12.5 SCREENING FOR MALIGNANT NEOPLASM OF PROSTATE: ICD-10-CM

## 2021-08-18 DIAGNOSIS — E78.00 PURE HYPERCHOLESTEROLEMIA: ICD-10-CM

## 2021-08-18 DIAGNOSIS — Z00.00 ROUTINE GENERAL MEDICAL EXAMINATION AT A HEALTH CARE FACILITY: Primary | ICD-10-CM

## 2021-08-18 DIAGNOSIS — I10 ESSENTIAL HYPERTENSION: Chronic | ICD-10-CM

## 2021-08-18 DIAGNOSIS — E11.9 TYPE 2 DIABETES MELLITUS WITHOUT COMPLICATION, WITHOUT LONG-TERM CURRENT USE OF INSULIN (HCC): ICD-10-CM

## 2021-08-18 PROCEDURE — 99214 OFFICE O/P EST MOD 30 MIN: CPT | Performed by: FAMILY MEDICINE

## 2021-08-18 PROCEDURE — 1036F TOBACCO NON-USER: CPT | Performed by: FAMILY MEDICINE

## 2021-08-18 PROCEDURE — 3017F COLORECTAL CA SCREEN DOC REV: CPT | Performed by: FAMILY MEDICINE

## 2021-08-18 PROCEDURE — 2022F DILAT RTA XM EVC RTNOPTHY: CPT | Performed by: FAMILY MEDICINE

## 2021-08-18 PROCEDURE — G8427 DOCREV CUR MEDS BY ELIG CLIN: HCPCS | Performed by: FAMILY MEDICINE

## 2021-08-18 PROCEDURE — G8417 CALC BMI ABV UP PARAM F/U: HCPCS | Performed by: FAMILY MEDICINE

## 2021-08-18 PROCEDURE — 4040F PNEUMOC VAC/ADMIN/RCVD: CPT | Performed by: FAMILY MEDICINE

## 2021-08-18 PROCEDURE — 3044F HG A1C LEVEL LT 7.0%: CPT | Performed by: FAMILY MEDICINE

## 2021-08-18 PROCEDURE — G0438 PPPS, INITIAL VISIT: HCPCS | Performed by: FAMILY MEDICINE

## 2021-08-18 PROCEDURE — 1123F ACP DISCUSS/DSCN MKR DOCD: CPT | Performed by: FAMILY MEDICINE

## 2021-08-18 ASSESSMENT — PATIENT HEALTH QUESTIONNAIRE - PHQ9
1. LITTLE INTEREST OR PLEASURE IN DOING THINGS: 0
SUM OF ALL RESPONSES TO PHQ QUESTIONS 1-9: 0
SUM OF ALL RESPONSES TO PHQ QUESTIONS 1-9: 0
SUM OF ALL RESPONSES TO PHQ9 QUESTIONS 1 & 2: 0
2. FEELING DOWN, DEPRESSED OR HOPELESS: 0
SUM OF ALL RESPONSES TO PHQ QUESTIONS 1-9: 0

## 2021-08-18 ASSESSMENT — LIFESTYLE VARIABLES
AUDIT-C TOTAL SCORE: 1
HOW OFTEN DURING THE LAST YEAR HAVE YOU BEEN UNABLE TO REMEMBER WHAT HAPPENED THE NIGHT BEFORE BECAUSE YOU HAD BEEN DRINKING: 0
HOW OFTEN DURING THE LAST YEAR HAVE YOU NEEDED AN ALCOHOLIC DRINK FIRST THING IN THE MORNING TO GET YOURSELF GOING AFTER A NIGHT OF HEAVY DRINKING: 0
HOW OFTEN DO YOU HAVE A DRINK CONTAINING ALCOHOL: 1
HOW OFTEN DO YOU HAVE SIX OR MORE DRINKS ON ONE OCCASION: 0
HAS A RELATIVE, FRIEND, DOCTOR, OR ANOTHER HEALTH PROFESSIONAL EXPRESSED CONCERN ABOUT YOUR DRINKING OR SUGGESTED YOU CUT DOWN: 0
HOW OFTEN DURING THE LAST YEAR HAVE YOU HAD A FEELING OF GUILT OR REMORSE AFTER DRINKING: 0
HOW MANY STANDARD DRINKS CONTAINING ALCOHOL DO YOU HAVE ON A TYPICAL DAY: 0
HOW OFTEN DURING THE LAST YEAR HAVE YOU FOUND THAT YOU WERE NOT ABLE TO STOP DRINKING ONCE YOU HAD STARTED: 0
HAVE YOU OR SOMEONE ELSE BEEN INJURED AS A RESULT OF YOUR DRINKING: 0
AUDIT TOTAL SCORE: 1
HOW OFTEN DURING THE LAST YEAR HAVE YOU FAILED TO DO WHAT WAS NORMALLY EXPECTED FROM YOU BECAUSE OF DRINKING: 0

## 2021-08-18 ASSESSMENT — ENCOUNTER SYMPTOMS
CONSTIPATION: 0
DIARRHEA: 0
SHORTNESS OF BREATH: 0
BACK PAIN: 0
CHEST TIGHTNESS: 0
RHINORRHEA: 0

## 2021-08-18 NOTE — PROGRESS NOTES
SUBJECTIVE:    Deyanira Hewitt is a 80 y.o. male who presents for a follow up visit. Chief Complaint   Patient presents with   [facility] AW     Patient is here for a follow up/V- discuss lab. Diabetes  He presents for his follow-up diabetic visit. He has type 2 diabetes mellitus. Disease course: Hemoglobin A1c 6.8. There are no hypoglycemic associated symptoms. Pertinent negatives for hypoglycemia include no dizziness, headaches or nervousness/anxiousness. Pertinent negatives for diabetes include no chest pain and no fatigue. There are no hypoglycemic complications. Risk factors for coronary artery disease include diabetes mellitus, dyslipidemia, hypertension, sedentary lifestyle, male sex and obesity. Current diabetic treatments: Metformin 1000 mg daily. He is compliant with treatment most of the time. He is following a generally healthy diet. Meal planning includes avoidance of concentrated sweets. He rarely participates in exercise. An ACE inhibitor/angiotensin II receptor blocker is being taken. Hyperlipidemia  This is a chronic problem. The current episode started more than 1 year ago. The problem is controlled. Exacerbating diseases include diabetes and obesity. Pertinent negatives include no chest pain or shortness of breath. Current antihyperlipidemic treatment includes statins. The current treatment provides significant improvement of lipids. Compliance problems include adherence to exercise and adherence to diet. Risk factors for coronary artery disease include diabetes mellitus, dyslipidemia, hypertension, male sex, obesity and a sedentary lifestyle. Hypertension  This is a chronic problem. The current episode started more than 1 year ago. The problem is controlled. Pertinent negatives include no chest pain, headaches, palpitations or shortness of breath. Risk factors for coronary artery disease include diabetes mellitus, dyslipidemia, male gender, obesity and sedentary lifestyle.  Past treatments include ACE inhibitors. The current treatment provides significant improvement. Compliance problems include exercise and diet. Patient's medications, allergies, past medical,surgical, social and family histories were reviewed and updated as appropriate. Past Medical History:   Diagnosis Date    Hyperlipidemia     Hypertension     Type II or unspecified type diabetes mellitus without mention of complication, not stated as uncontrolled      Past Surgical History:   Procedure Laterality Date    FOOT SURGERY      repair of fallen arch and tendon repair    KNEE ARTHROSCOPY      Both knees R 2007, L 2017    SHOULDER SURGERY Right 10/30/2013     Family History   Problem Relation Age of Onset    Breast Cancer Mother     Heart Attack Mother     Diabetes Maternal Aunt      Social History     Tobacco Use    Smoking status: Never Smoker    Smokeless tobacco: Never Used   Substance Use Topics    Alcohol use: No      Allergies   Allergen Reactions    No Known Allergies      Current Outpatient Medications on File Prior to Visit   Medication Sig Dispense Refill    metFORMIN (GLUCOPHAGE-XR) 500 MG extended release tablet Take 2 tablets by mouth daily (with breakfast) 180 tablet 2    Multiple Vitamins-Minerals (THERAPEUTIC MULTIVITAMIN-MINERALS) tablet Take 1 tablet by mouth daily      simvastatin (ZOCOR) 40 MG tablet TAKE 1 TABLET BY MOUTH EVERY DAY EVERY NIGHT 90 tablet 3    lisinopril (PRINIVIL;ZESTRIL) 10 MG tablet TAKE 1 TABLET BY MOUTH EVERY DAY 90 tablet 3    Cholecalciferol (VITAMIN D3) 1000 units TABS Take 1 tablet by mouth daily 30 tablet 0     No current facility-administered medications on file prior to visit. Review of Systems   Constitutional: Negative for activity change, fatigue and unexpected weight change. HENT: Negative for congestion, postnasal drip and rhinorrhea. Respiratory: Negative for chest tightness and shortness of breath.     Cardiovascular: Negative for hypercholesterolemia  -     Comprehensive Metabolic Panel, Fasting; Future  -     CBC Auto Differential; Future  -     Lipid, Fasting; Future  -     TSH with Reflex; Future    Essential hypertension  Good control on current medications    Screening for malignant neoplasm of prostate  -     PSA screening; Future        Return in 6 months (on 2022) for Medicare Annual Wellness Visit in 1 year. Please note portions of this note were completed with a voicerecognition program.  Efforts were made to edit the dictations but occasionally words are mis-transcribed. Medicare Annual Wellness Visit  Name: Jennifer Colon Date: 2021   MRN: [id number] Sex: Male   Age: 80 y.o. Ethnicity: Non- / Non    : 1950 Race: White (non-)      Gianna Aaron is here for Medicare AWV (Patient is here for a follow up/AWV- discuss lab.  )    Screenings for behavioral, psychosocial and functional/safety risks, and cognitive dysfunction are all negative except as indicated below. These results, as well as other patient data from the [address] form, are documented in Flowsheets linked to this Encounter. Allergies   Allergen Reactions    No Known Allergies          Prior to Visit Medications    Medication Sig Taking?  Authorizing Provider   metFORMIN (GLUCOPHAGE-XR) 500 MG extended release tablet Take 2 tablets by mouth daily (with breakfast) Yes Pelon Archer MD   Multiple Vitamins-Minerals (THERAPEUTIC MULTIVITAMIN-MINERALS) tablet Take 1 tablet by mouth daily Yes Historical Provider, MD   simvastatin (ZOCOR) 40 MG tablet TAKE 1 TABLET BY MOUTH EVERY DAY EVERY NIGHT Yes Nicole Ureña MD   lisinopril (PRINIVIL;ZESTRIL) 10 MG tablet TAKE 1 TABLET BY MOUTH EVERY DAY Yes Nicole Ureña MD   Cholecalciferol (VITAMIN D3) 1000 units TABS Take 1 tablet by mouth daily Yes Nicole Ureña MD         Past Medical History:   Diagnosis Date    Hyperlipidemia     Hypertension  Type II or unspecified type diabetes mellitus without mention of complication, not stated as uncontrolled        Past Surgical History:   Procedure Laterality Date    FOOT SURGERY      repair of fallen arch and tendon repair    KNEE ARTHROSCOPY      Both knees R 2007, L 2017    SHOULDER SURGERY Right 10/30/2013         Family History   Problem Relation Age of Onset    Breast Cancer Mother     Heart Attack Mother     Diabetes Maternal Aunt        CareTeam (Including outside providers/suppliers regularly involved in providing care):   Patient Care Team:  Sarah Vasquez MD as PCP - General (Family Medicine)  Sarah Vasquez MD as PCP - [facility] Empaneled Provider    Wt Readings from Last 3 Encounters:   08/18/21 230 lb (104.3 kg)   05/11/21 239 lb (108.4 kg)   02/09/21 234 lb (106.1 kg)     Vitals:    08/18/21 1343   BP: 110/70   Temp: 98.2 °F (36.8 °C)   Weight: 230 lb (104.3 kg)   Height: 6' (1.829 m)     Body mass index is 31.19 kg/m². Based upon direct observation of the patient, evaluation of cognition reveals recent and remote memory intact. Patient's complete Health Risk Assessment and screening values have been reviewed and are found in Flowsheets. The following problems were reviewed today and where indicated follow up appointments were made and/or referrals ordered. Positive Risk Factor Screenings with Interventions:            General Health and ACP:  General  In general, how would you say your health is?: Excellent  In the past 7 days, have you experienced any of the following?  New or Increased Pain, New or Increased Fatigue, Loneliness, Social Isolation, Stress or Anger?: None of These  Do you get the social and emotional support that you need?: Yes  Do you have a Living Will?: (!) No  Advance Directives     Power of [facility] Will ACP-Advance Directive ACP-Power of     Not on File Not on File Not on File Not on [facility] Interventions:  · No Living Will: Advance Care Planning addressed with patient today    Health Habits/Nutrition:  Health Habits/Nutrition  Do you exercise for at least 20 minutes 2-3 times per week?: Yes  Have you lost any weight without trying in the past 3 months?: No  Do you eat only one meal per day?: No  Have you seen the dentist within the past year?: Yes  Body mass index: (!) 31.19  Health Habits/Nutrition Interventions:  · Inadequate physical activity:  patient is not ready to increase his/her physical activity level at this time       Personalized Preventive Plan   Current Health Maintenance Status  Immunization History   Administered Date(s) Administered    COVID-19, Langley Peter, PF, 30mcg/0.3mL 02/27/2021, 03/20/2021    Influenza 11/15/2011, 10/16/2012    Influenza Virus Vaccine 10/30/2014, 10/10/2015    Influenza, High Dose (Fluzone 65 yrs and older) 10/07/2016, 10/09/2017, 10/08/2018, 10/01/2020    Influenza, Triv, inactivated, subunit, adjuvanted, IM (Fluad 65 yrs and older) 10/07/2019    Pneumococcal Conjugate 13-valent ([id number]) 04/07/2017    Pneumococcal Polysaccharide ([id number]) 04/09/2018    Tdap (Boostrix, Adacel) 02/09/2021        Health Maintenance   Topic Date Due    Hepatitis C screen  Never done    Shingles Vaccine (1 of 2) Never done    Diabetic microalbuminuria test  03/13/2013    Diabetic retinal exam  03/13/2013    Colon cancer screen colonoscopy  11/20/2017    Annual Wellness Visit (AWV)  Never done    Flu vaccine (1) 09/01/2021    Lipid screen  04/24/2022    PSA counseling  04/24/2022    Diabetic foot exam  05/11/2022    A1C test (Diabetic or Prediabetic)  08/07/2022    Potassium monitoring  08/07/2022    Creatinine monitoring  08/07/2022    DTaP/Tdap/Td vaccine (2 - Td or Tdap) 02/09/2031    Pneumococcal 65+ years Vaccine  Completed    COVID-19 Vaccine  Completed    Hepatitis A vaccine  Aged Out    Hib vaccine  Aged Out    Meningococcal (ACWY) vaccine  Aged Out     Recommendations for Preventive Services Due: see orders and patient instructions/AVS.  . Recommended screening schedule for the next 5-10 years is provided to the patient in written form: see Patient Instructions/AVS.    [facility] was seen today for medicare awv. Diagnoses and all orders for this visit:    Routine general medical examination at a health care facility  Medicare AWV done today    Type 2 diabetes mellitus without complication, without long-term current use of insulin ([facility] [address]  Patient is overdue for a microalbumin  -     Hemoglobin A1C; Future  -     Microalbumin / Creatinine Urine Ratio; Future    Pure hypercholesterolemia  -     Comprehensive Metabolic Panel, Fasting; Future  -     CBC Auto Differential; Future  -     Lipid, Fasting; Future  -     TSH with Reflex; Future    Essential hypertension  Stable on current medication    Screening for malignant neoplasm of prostate  -     PSA screening;  Future

## 2021-08-18 NOTE — PATIENT INSTRUCTIONS
Personalized Preventive Plan for Clarence Jay - 8/18/2021  Medicare offers a range of preventive health benefits. Some of the tests and screenings are paid in full while other may be subject to a deductible, co-insurance, and/or copay. Some of these benefits include a comprehensive review of your medical history including lifestyle, illnesses that may run in your family, and various assessments and screenings as appropriate. After reviewing your medical record and screening and assessments performed today your provider may have ordered immunizations, labs, imaging, and/or referrals for you. A list of these orders (if applicable) as well as your Preventive Care list are included within your After Visit Summary for your review. Other Preventive Recommendations:    · A preventive eye exam performed by an eye specialist is recommended every 1-2 years to screen for glaucoma; cataracts, macular degeneration, and other eye disorders. · A preventive dental visit is recommended every 6 months. · Try to get at least 150 minutes of exercise per week or 10,000 steps per day on a pedometer . · Order or download the FREE \"Exercise & Physical Activity: Your Everyday Guide\" from [employer]. Call 7-164.554.4519 or search [employer] online. · You need 2976-2368 mg of calcium and 3670-6093 IU of vitamin D per day. It is possible to meet your calcium requirement with diet alone, but a vitamin D supplement is usually necessary to meet this goal.  · When exposed to the sun, use a sunscreen that protects against both UVA and UVB radiation with an SPF of 30 or greater. Reapply every 2 to 3 hours or after sweating, drying off with a towel, or swimming. · Always wear a seat belt when traveling in a car. Always wear a helmet when riding a bicycle or motorcycle.

## 2021-09-17 ENCOUNTER — TELEPHONE (OUTPATIENT)
Dept: FAMILY MEDICINE CLINIC | Age: 71
End: 2021-09-17

## 2021-09-17 RX ORDER — LISINOPRIL 10 MG/1
TABLET ORAL
Qty: 90 TABLET | Refills: 3 | Status: SHIPPED | OUTPATIENT
Start: 2021-09-17 | End: 2022-09-13

## 2021-09-17 RX ORDER — SIMVASTATIN 40 MG
TABLET ORAL
Qty: 90 TABLET | Refills: 3 | Status: SHIPPED | OUTPATIENT
Start: 2021-09-17 | End: 2022-11-03

## 2021-09-17 NOTE — TELEPHONE ENCOUNTER
lisinopril (PRINIVIL;ZESTRIL) 10 MG tablet [[id number]    simvastatin (ZOCOR) 40 MG tablet [[id number]    [id number], [address] [phone]   [address], [address] [facility] [place]    Phone:  [phone]  Fax:  [phone]

## 2021-11-25 DIAGNOSIS — E11.9 TYPE 2 DIABETES MELLITUS WITHOUT COMPLICATION, WITHOUT LONG-TERM CURRENT USE OF INSULIN (HCC): ICD-10-CM

## 2021-11-26 RX ORDER — METFORMIN HYDROCHLORIDE 500 MG/1
1000 TABLET, EXTENDED RELEASE ORAL
Qty: 180 TABLET | Refills: 2 | Status: SHIPPED | OUTPATIENT
Start: 2021-11-26 | End: 2022-10-24

## 2021-11-26 NOTE — TELEPHONE ENCOUNTER
Medication:   Requested Prescriptions     Pending Prescriptions Disp Refills    metFORMIN (GLUCOPHAGE-XR) 500 MG extended release tablet [Pharmacy Med Name: METFORMIN HCL  MG TABLET] 180 tablet 2     Sig: TAKE 2 TABLETS BY MOUTH DAILY (WITH BREAKFAST)      Last Filled:      Patient Phone Number: [phone] (home)     Last appt: 8/18/2021   Next appt: 2/18/2022    Last OARRS: No flowsheet data found.   PDMP Monitoring:    Last PDMP Jerica  as Reviewed [facility]):  Review User Review Instant Review Result          Preferred Pharmacy:   [facility] [address] [address]  Phone: [phone] Fax: [phone]

## 2022-02-12 ENCOUNTER — HOSPITAL ENCOUNTER (OUTPATIENT)
Age: 72
Discharge: HOME | End: 2022-02-12
Payer: MEDICARE

## 2022-02-12 DIAGNOSIS — E78.00 PURE HYPERCHOLESTEROLEMIA: ICD-10-CM

## 2022-02-12 DIAGNOSIS — E11.9 TYPE 2 DIABETES MELLITUS WITHOUT COMPLICATION, WITHOUT LONG-TERM CURRENT USE OF INSULIN (HCC): ICD-10-CM

## 2022-02-12 DIAGNOSIS — Z12.5 SCREENING FOR MALIGNANT NEOPLASM OF PROSTATE: ICD-10-CM

## 2022-02-12 LAB
A/G RATIO: 1.6 (ref 1.1–2.2)
ALBUMIN SERPL-MCNC: 4.7 G/DL (ref 3.4–5)
ALP BLD-CCNC: 70 U/L (ref 40–129)
ALT SERPL-CCNC: 22 U/L (ref 10–40)
ANION GAP SERPL CALCULATED.3IONS-SCNC: 17 MMOL/L (ref 3–16)
AST SERPL-CCNC: 15 U/L (ref 15–37)
BASOPHILS ABSOLUTE: 0 K/UL (ref 0–0.2)
BASOPHILS RELATIVE PERCENT: 0.3 %
BILIRUB SERPL-MCNC: 0.8 MG/DL (ref 0–1)
BUN BLDV-MCNC: 18 MG/DL (ref 7–20)
CALCIUM SERPL-MCNC: 9.8 MG/DL (ref 8.3–10.6)
CHLORIDE BLD-SCNC: 95 MMOL/L (ref 99–110)
CHOLESTEROL, FASTING: 159 MG/DL (ref 0–199)
CO2: 24 MMOL/L (ref 21–32)
CREAT SERPL-MCNC: 0.8 MG/DL (ref 0.8–1.3)
CREATININE URINE: 117.4 MG/DL (ref 39–259)
EOSINOPHILS ABSOLUTE: 0.1 K/UL (ref 0–0.6)
EOSINOPHILS RELATIVE PERCENT: 0.4 %
GFR AFRICAN AMERICAN: >60
GFR NON-AFRICAN AMERICAN: >60
GLUCOSE FASTING: 143 MG/DL (ref 70–99)
HCT VFR BLD CALC: 46.3 % (ref 40.5–52.5)
HDLC SERPL-MCNC: 77 MG/DL (ref 40–60)
HEMOGLOBIN: 15.5 G/DL (ref 13.5–17.5)
LDL CHOLESTEROL CALCULATED: 62 MG/DL
LYMPHOCYTES ABSOLUTE: 2.3 K/UL (ref 1–5.1)
LYMPHOCYTES RELATIVE PERCENT: 20.1 %
MCH RBC QN AUTO: 30.9 PG (ref 26–34)
MCHC RBC AUTO-ENTMCNC: 33.5 G/DL (ref 31–36)
MCV RBC AUTO: 92 FL (ref 80–100)
MICROALBUMIN UR-MCNC: <1.2 MG/DL
MICROALBUMIN/CREAT UR-RTO: NORMAL MG/G (ref 0–30)
MONOCYTES ABSOLUTE: 1.2 K/UL (ref 0–1.3)
MONOCYTES RELATIVE PERCENT: 10.9 %
NEUTROPHILS ABSOLUTE: 7.8 K/UL (ref 1.7–7.7)
NEUTROPHILS RELATIVE PERCENT: 68.3 %
PDW BLD-RTO: 13.6 % (ref 12.4–15.4)
PLATELET # BLD: 253 K/UL (ref 135–450)
PMV BLD AUTO: 9.6 FL (ref 5–10.5)
POTASSIUM SERPL-SCNC: 4.5 MMOL/L (ref 3.5–5.1)
PROSTATE SPECIFIC ANTIGEN: 5.92 NG/ML (ref 0–4)
RBC # BLD: 5.03 M/UL (ref 4.2–5.9)
SODIUM BLD-SCNC: 136 MMOL/L (ref 136–145)
TOTAL PROTEIN: 7.7 G/DL (ref 6.4–8.2)
TRIGLYCERIDE, FASTING: 99 MG/DL (ref 0–150)
TSH REFLEX: 1.62 UIU/ML (ref 0.27–4.2)
VLDLC SERPL CALC-MCNC: 20 MG/DL
WBC # BLD: 11.5 K/UL (ref 4–11)

## 2022-02-12 PROCEDURE — 83036 HEMOGLOBIN GLYCOSYLATED A1C: CPT

## 2022-02-12 PROCEDURE — 84153 ASSAY OF PSA TOTAL: CPT

## 2022-02-12 PROCEDURE — 85025 COMPLETE CBC W/AUTO DIFF WBC: CPT

## 2022-02-12 PROCEDURE — 84443 ASSAY THYROID STIM HORMONE: CPT

## 2022-02-12 PROCEDURE — 82043 UR ALBUMIN QUANTITATIVE: CPT

## 2022-02-12 PROCEDURE — 36415 COLL VENOUS BLD VENIPUNCTURE: CPT

## 2022-02-12 PROCEDURE — 80061 LIPID PANEL: CPT

## 2022-02-12 PROCEDURE — 82570 ASSAY OF URINE CREATININE: CPT

## 2022-02-12 PROCEDURE — 80053 COMPREHEN METABOLIC PANEL: CPT

## 2022-02-13 LAB
ESTIMATED AVERAGE GLUCOSE: 151.3 MG/DL
HBA1C MFR BLD: 6.9 %

## 2022-02-18 ENCOUNTER — OFFICE VISIT (OUTPATIENT)
Dept: FAMILY MEDICINE CLINIC | Age: 72
End: 2022-02-18
Payer: MEDICARE

## 2022-02-18 VITALS
SYSTOLIC BLOOD PRESSURE: 126 MMHG | BODY MASS INDEX: 31.06 KG/M2 | TEMPERATURE: 98.4 F | WEIGHT: 229 LBS | DIASTOLIC BLOOD PRESSURE: 80 MMHG

## 2022-02-18 DIAGNOSIS — E78.00 PURE HYPERCHOLESTEROLEMIA: ICD-10-CM

## 2022-02-18 DIAGNOSIS — E11.9 TYPE 2 DIABETES MELLITUS WITHOUT COMPLICATION, WITHOUT LONG-TERM CURRENT USE OF INSULIN (HCC): Primary | ICD-10-CM

## 2022-02-18 DIAGNOSIS — I10 PRIMARY HYPERTENSION: Chronic | ICD-10-CM

## 2022-02-18 DIAGNOSIS — R97.20 ELEVATED PSA: ICD-10-CM

## 2022-02-18 PROCEDURE — G8427 DOCREV CUR MEDS BY ELIG CLIN: HCPCS | Performed by: FAMILY MEDICINE

## 2022-02-18 PROCEDURE — G8482 FLU IMMUNIZE ORDER/ADMIN: HCPCS | Performed by: FAMILY MEDICINE

## 2022-02-18 PROCEDURE — 2022F DILAT RTA XM EVC RTNOPTHY: CPT | Performed by: FAMILY MEDICINE

## 2022-02-18 PROCEDURE — 99214 OFFICE O/P EST MOD 30 MIN: CPT | Performed by: FAMILY MEDICINE

## 2022-02-18 PROCEDURE — 1036F TOBACCO NON-USER: CPT | Performed by: FAMILY MEDICINE

## 2022-02-18 PROCEDURE — 3017F COLORECTAL CA SCREEN DOC REV: CPT | Performed by: FAMILY MEDICINE

## 2022-02-18 PROCEDURE — G8417 CALC BMI ABV UP PARAM F/U: HCPCS | Performed by: FAMILY MEDICINE

## 2022-02-18 PROCEDURE — 3044F HG A1C LEVEL LT 7.0%: CPT | Performed by: FAMILY MEDICINE

## 2022-02-18 PROCEDURE — 4040F PNEUMOC VAC/ADMIN/RCVD: CPT | Performed by: FAMILY MEDICINE

## 2022-02-18 PROCEDURE — 1123F ACP DISCUSS/DSCN MKR DOCD: CPT | Performed by: FAMILY MEDICINE

## 2022-02-18 ASSESSMENT — ENCOUNTER SYMPTOMS
CONSTIPATION: 0
RHINORRHEA: 1
SHORTNESS OF BREATH: 0
DIARRHEA: 0
BACK PAIN: 0
CHEST TIGHTNESS: 0

## 2022-02-18 NOTE — PROGRESS NOTES
shortness of breath. Current antihyperlipidemic treatment includes statins. The current treatment provides mild improvement of lipids. Compliance problems include adherence to exercise and adherence to diet. Risk factors for coronary artery disease include diabetes mellitus, dyslipidemia, hypertension, male sex, a sedentary lifestyle and obesity. Patient's medications, allergies, past medical,surgical, social and family histories were reviewed and updated as appropriate. Past Medical History:   Diagnosis Date    Hyperlipidemia     Hypertension     Type II or unspecified type diabetes mellitus without mention of complication, not stated as uncontrolled      Past Surgical History:   Procedure Laterality Date    FOOT SURGERY      repair of fallen arch and tendon repair    KNEE ARTHROSCOPY      Both knees R 2007, L 2017    SHOULDER SURGERY Right 10/30/2013     Family History   Problem Relation Age of Onset    Breast Cancer Mother     Heart Attack Mother     Diabetes Maternal Aunt      Social History     Tobacco Use    Smoking status: Never Smoker    Smokeless tobacco: Never Used   Substance Use Topics    Alcohol use: No      Allergies   Allergen Reactions    No Known Allergies      Current Outpatient Medications on File Prior to Visit   Medication Sig Dispense Refill    metFORMIN (GLUCOPHAGE-XR) 500 MG extended release tablet TAKE 2 TABLETS BY MOUTH DAILY (WITH BREAKFAST) 180 tablet 2    lisinopril (PRINIVIL;ZESTRIL) 10 MG tablet TAKE 1 TABLET BY MOUTH EVERY DAY 90 tablet 3    simvastatin (ZOCOR) 40 MG tablet TAKE 1 TABLET BY MOUTH EVERY DAY EVERY NIGHT 90 tablet 3    Multiple Vitamins-Minerals (THERAPEUTIC MULTIVITAMIN-MINERALS) tablet Take 1 tablet by mouth daily      Cholecalciferol (VITAMIN D3) 1000 units TABS Take 1 tablet by mouth daily 30 tablet 0     No current facility-administered medications on file prior to visit.         Review of Systems   Constitutional: Negative for activity change and unexpected weight change. HENT: Positive for postnasal drip and rhinorrhea. Negative for congestion. Respiratory: Negative for chest tightness and shortness of breath. Cardiovascular: Negative for chest pain, palpitations and leg swelling. Gastrointestinal: Negative for constipation and diarrhea. Genitourinary: Negative for difficulty urinating. Musculoskeletal: Positive for myalgias ( left thigh). Negative for back pain. Neurological: Negative for dizziness, light-headedness and headaches. Psychiatric/Behavioral: Negative for dysphoric mood and sleep disturbance. The patient is not nervous/anxious. OBJECTIVE:    /80   Temp 98.4 °F (36.9 °C)   Wt 229 lb (103.9 kg)   BMI 31.06 kg/m²    Physical Exam  Constitutional:       Appearance: He is well-developed. HENT:      Head: Normocephalic and atraumatic. Right Ear: External ear normal.      Left Ear: External ear normal.      Nose: Nose normal.   Eyes:      General:         Right eye: No discharge. Conjunctiva/sclera: Conjunctivae normal.   Neck:      Thyroid: No thyromegaly. Vascular: No JVD. Trachea: No tracheal deviation. Cardiovascular:      Rate and Rhythm: Normal rate and regular rhythm. Heart sounds: Normal heart sounds. Pulmonary:      Effort: Pulmonary effort is normal. No respiratory distress. Breath sounds: Normal breath sounds. No rales. Musculoskeletal:      Cervical back: Normal range of motion and neck supple. Lymphadenopathy:      Cervical: No cervical adenopathy. Skin:     General: Skin is warm and dry. Neurological:      Mental Status: He is alert and oriented to person, place, and time. ASSESSMENT/PLAN:    Christopher Farnsworth was seen today for follow-up. Diagnoses and all orders for this visit:    Type 2 diabetes mellitus without complication, without long-term current use of insulin (HCC)  Continue Metformin at current dose.  Encouraged low carbohydrate diet and exercise. -     Hemoglobin A1C; Future    Primary hypertension  Good control with current medications    Pure hypercholesterolemia  Good control with current medications. -     Comprehensive Metabolic Panel, Fasting; Future  -     CBC with Auto Differential; Future  -     Lipid, Fasting; Future    Elevated PSA  -     AFL - Micheal King MD, Urology, [facility]        Return in about 6 months (around 8/18/2022) for follow up of diabetes, follow up of hyperlipidemia, follow up of hypertension. Please note portions of this note were completed with a voicerecognition program.  Efforts were made to edit the dictations but occasionally words are mis-transcribed.

## 2022-03-11 LAB
BUN / CREAT RATIO: 14.4 RATIO (ref 9–28)
BUN BLDV-MCNC: 13 MG/DL (ref 6–20)
CREAT SERPL-MCNC: 0.9 MG/DL (ref 0.7–1.2)

## 2022-04-21 LAB
BUN / CREAT RATIO: 18.8 RATIO (ref 9–28)
BUN BLDV-MCNC: 15 MG/DL (ref 6–20)
CREAT SERPL-MCNC: 0.8 MG/DL (ref 0.7–1.2)

## 2022-04-26 ENCOUNTER — HOSPITAL ENCOUNTER (OUTPATIENT)
Dept: NUCLEAR MEDICINE | Age: 72
Discharge: HOME | End: 2022-04-26
Payer: MEDICARE

## 2022-04-26 ENCOUNTER — HOSPITAL ENCOUNTER (OUTPATIENT)
Dept: CT IMAGING | Age: 72
Discharge: HOME | End: 2022-04-26
Payer: MEDICARE

## 2022-04-26 DIAGNOSIS — R97.20 ELEVATED PROSTATE SPECIFIC ANTIGEN (PSA): ICD-10-CM

## 2022-04-26 DIAGNOSIS — C61 MALIGNANT NEOPLASM OF PROSTATE (HCC): ICD-10-CM

## 2022-04-26 PROCEDURE — 2580000003 HC RX 258: Performed by: UROLOGY

## 2022-04-26 PROCEDURE — 74177 CT ABD & PELVIS W/CONTRAST: CPT

## 2022-04-26 PROCEDURE — A9503 TC99M MEDRONATE: HCPCS | Performed by: UROLOGY

## 2022-04-26 PROCEDURE — 6360000004 HC RX CONTRAST MEDICATION: Performed by: UROLOGY

## 2022-04-26 PROCEDURE — 3430000000 HC RX DIAGNOSTIC RADIOPHARMACEUTICAL: Performed by: UROLOGY

## 2022-04-26 PROCEDURE — 78306 BONE IMAGING WHOLE BODY: CPT

## 2022-04-26 RX ORDER — TC 99M MEDRONATE 20 MG/10ML
26.4 INJECTION, POWDER, LYOPHILIZED, FOR SOLUTION INTRAVENOUS
Status: COMPLETED | OUTPATIENT
Start: 2022-04-26 | End: 2022-04-26

## 2022-04-26 RX ORDER — SODIUM CHLORIDE 0.9 % (FLUSH) 0.9 %
10 SYRINGE (ML) INJECTION PRN
Status: DISCONTINUED | OUTPATIENT
Start: 2022-04-26 | End: 2022-04-27 | Stop reason: HOSPADM

## 2022-04-26 RX ADMIN — IOPAMIDOL 75 ML: 755 INJECTION, SOLUTION INTRAVENOUS at 07:33

## 2022-04-26 RX ADMIN — TC 99M MEDRONATE 26.4 MILLICURIE: 20 INJECTION, POWDER, LYOPHILIZED, FOR SOLUTION INTRAVENOUS at 08:06

## 2022-04-26 RX ADMIN — Medication 10 ML: at 08:06

## 2022-05-02 NOTE — PROGRESS NOTES
Name_______________________________________Printed:____________________  Date and time of surgery__5/25/22 @ 0730______________________Arrival Time:__0600 main hosp______________   1. The instructions given regarding when and if a patient needs to stop oral intake prior to surgery varies. Follow the specific instructions you were given                  ___Nothing to eat or to drink after Midnight the night before. __x__Carbo loading or ERAS instructions will be given to select patients-if you have been given those instructions -please do the following                           The evening before your surgery after dinner before midnight drink 40 ounces of gatorade. If you are diabetic use sugar free. The morning of surgery drink 40 ounces of water. This needs to be finished 3 hours prior to your surgery start time. 2. Take the following pills with a small sip of water on the morning of surgery_____none______________________________________________                  Do not take blood pressure medications ending in pril or sartan the mark prior to surgery or the morning of surgery_   3. Aspirin, Ibuprofen, Advil, Naproxen, Vitamin E and other Anti-inflammatory products and supplements should be stopped for 5 -7days before surgery or as directed by your physician. 4. Check with your Doctor regarding stopping Plavix, Coumadin,Eliquis, Lovenox,Effient,Pradaxa,Xarelto, Fragmin or other blood thinners and follow their instructions. 5. Do not smoke, and do not drink any alcoholic beverages 24 hours prior to surgery. This includes NA Beer. Refrain from the usage of any recreational drugs. 6. You may brush your teeth and gargle the morning of surgery. DO NOT SWALLOW WATER   7. You MUST make arrangements for a responsible adult to stay on site while you are here and take you home after your surgery. You will not be allowed to leave alone or drive yourself home.   It is strongly suggested someone stay with you the first 24 hrs. Your surgery will be cancelled if you do not have a ride home. 8. A parent/legal guardian must accompany a child scheduled for surgery and plan to stay at the hospital until the child is discharged. Please do not bring other children with you. 9. Please wear simple, loose fitting clothing to the hospital.  Marigene Pel not bring valuables (money, credit cards, checkbooks, etc.) Do not wear any makeup (including no eye makeup) or nail polish on your fingers or toes. 10. DO NOT wear any jewelry or piercings on day of surgery. All body piercing jewelry must be removed. 11. If you have ___dentures, they will be removed before going to the OR; we will provide you a container. If you wear ___contact lenses or _x__glasses, they will be removed; please bring a case for them. 12. Please see your family doctor/pediatrician for a history & physical and/or concerning medications. Bring any test results/reports from your physician's office. PCP___banks_______________Phone___________H&P Appt. Date__5/20_____             13 If you  have a Living Will and Durable Power of  for Healthcare, please bring in a copy. 15. Notify your Surgeon if you develop any illness between now and surgery  time, cough, cold, fever, sore throat, nausea, vomiting, etc.  Please notify your surgeon if you experience dizziness, shortness of breath or blurred vision between now & the time of your surgery             15. DO NOT shave your operative site 96 hours prior to surgery. For face & neck surgery, men may use an electric razor 48 hours prior to surgery. 16. Shower the night before or morning of surgery using an antibacterial soap or as you have been instructed. 17. To provide excellent care visitors will be limited to one in the room at any given time. 18.  Please bring picture ID and insurance card.              19.  Visit our web site for additional information:  [email or web address]              20.During flu season no children under the age of 15 are permitted in the hospital for the safety of all patients. 21. If you take a long acting insulin in the evening only  take half of your usual  dose the night  before your procedure              22. If you use a c-pap please bring DOS if staying overnight,             23.For your convenience [facility] has a pharmacy on site to fill your prescriptions. 24. If you use oxygen and have a portable tank please bring it  with you the DOS             25. Bring a complete list of all your medications with name and dose include any supplements. 26. Other__________________________________________   *Please call pre admission testing if you any further questions   [address]    [facility] [place]  [phone]   [address] [place] [address]       VISITOR POLICY(subject to change)    Current policy is 2 visitors per patient. No children. A mask is required. Visiting hours are 8a-8p. Overnight visitors will be at the discretion of the nurse. All above information reviewed with patient in person or by phone. Patient verbalizes understanding. All questions and concerns addressed.                                                                                                  Patient/Rep_____pt_______________                                                                                                                                    PRE OP INSTRUCTIONS

## 2022-05-05 LAB — PATHOLOGY/CYTOLOGY REPORT: NORMAL

## 2022-05-11 ENCOUNTER — HOSPITAL ENCOUNTER (OUTPATIENT)
Dept: GENERAL RADIOLOGY | Age: 72
Discharge: HOME | End: 2022-05-11
Payer: MEDICARE

## 2022-05-11 ENCOUNTER — HOSPITAL ENCOUNTER (OUTPATIENT)
Age: 72
Discharge: HOME | End: 2022-05-11
Payer: MEDICARE

## 2022-05-11 DIAGNOSIS — Z01.818 PREOP TESTING: ICD-10-CM

## 2022-05-11 LAB
A/G RATIO: 1.9 (ref 1.1–2.2)
ABO/RH: NORMAL
ALBUMIN SERPL-MCNC: 4.5 G/DL (ref 3.4–5)
ALP BLD-CCNC: 62 U/L (ref 40–129)
ALT SERPL-CCNC: 29 U/L (ref 10–40)
ANION GAP SERPL CALCULATED.3IONS-SCNC: 13 MMOL/L (ref 3–16)
ANTIBODY SCREEN: NORMAL
APTT: 35.2 SEC (ref 26.2–38.6)
AST SERPL-CCNC: 21 U/L (ref 15–37)
BASOPHILS ABSOLUTE: 0 K/UL (ref 0–0.2)
BASOPHILS RELATIVE PERCENT: 0.3 %
BILIRUB SERPL-MCNC: 0.6 MG/DL (ref 0–1)
BUN BLDV-MCNC: 12 MG/DL (ref 7–20)
CALCIUM SERPL-MCNC: 9.6 MG/DL (ref 8.3–10.6)
CHLORIDE BLD-SCNC: 104 MMOL/L (ref 99–110)
CO2: 24 MMOL/L (ref 21–32)
CREAT SERPL-MCNC: 0.9 MG/DL (ref 0.8–1.3)
EKG ATRIAL RATE: 73 BPM
EKG DIAGNOSIS: NORMAL
EKG P AXIS: 57 DEGREES
EKG P-R INTERVAL: 144 MS
EKG Q-T INTERVAL: 380 MS
EKG QRS DURATION: 90 MS
EKG QTC CALCULATION (BAZETT): 418 MS
EKG R AXIS: -39 DEGREES
EKG T AXIS: 27 DEGREES
EKG VENTRICULAR RATE: 73 BPM
EOSINOPHILS ABSOLUTE: 0.3 K/UL (ref 0–0.6)
EOSINOPHILS RELATIVE PERCENT: 4.2 %
GFR AFRICAN AMERICAN: >60
GFR NON-AFRICAN AMERICAN: >60
GLUCOSE BLD-MCNC: 148 MG/DL (ref 70–99)
HCT VFR BLD CALC: 42.4 % (ref 40.5–52.5)
HEMOGLOBIN: 13.9 G/DL (ref 13.5–17.5)
INR BLD: 0.94 (ref 0.88–1.12)
LYMPHOCYTES ABSOLUTE: 1.7 K/UL (ref 1–5.1)
LYMPHOCYTES RELATIVE PERCENT: 26.1 %
MCH RBC QN AUTO: 29.9 PG (ref 26–34)
MCHC RBC AUTO-ENTMCNC: 32.8 G/DL (ref 31–36)
MCV RBC AUTO: 91.1 FL (ref 80–100)
MONOCYTES ABSOLUTE: 0.7 K/UL (ref 0–1.3)
MONOCYTES RELATIVE PERCENT: 10.5 %
NEUTROPHILS ABSOLUTE: 3.9 K/UL (ref 1.7–7.7)
NEUTROPHILS RELATIVE PERCENT: 58.9 %
PDW BLD-RTO: 14.2 % (ref 12.4–15.4)
PLATELET # BLD: 250 K/UL (ref 135–450)
PMV BLD AUTO: 8.9 FL (ref 5–10.5)
POTASSIUM SERPL-SCNC: 4.7 MMOL/L (ref 3.5–5.1)
PROTHROMBIN TIME: 10.6 SEC (ref 9.9–12.7)
RBC # BLD: 4.66 M/UL (ref 4.2–5.9)
SODIUM BLD-SCNC: 141 MMOL/L (ref 136–145)
TOTAL PROTEIN: 6.9 G/DL (ref 6.4–8.2)
WBC # BLD: 6.7 K/UL (ref 4–11)

## 2022-05-11 PROCEDURE — 86901 BLOOD TYPING SEROLOGIC RH(D): CPT

## 2022-05-11 PROCEDURE — 36415 COLL VENOUS BLD VENIPUNCTURE: CPT

## 2022-05-11 PROCEDURE — 85730 THROMBOPLASTIN TIME PARTIAL: CPT

## 2022-05-11 PROCEDURE — 85025 COMPLETE CBC W/AUTO DIFF WBC: CPT

## 2022-05-11 PROCEDURE — 71046 X-RAY EXAM CHEST 2 VIEWS: CPT

## 2022-05-11 PROCEDURE — 85610 PROTHROMBIN TIME: CPT

## 2022-05-11 PROCEDURE — 86850 RBC ANTIBODY SCREEN: CPT

## 2022-05-11 PROCEDURE — 80053 COMPREHEN METABOLIC PANEL: CPT

## 2022-05-11 PROCEDURE — 86900 BLOOD TYPING SEROLOGIC ABO: CPT

## 2022-05-11 PROCEDURE — 93005 ELECTROCARDIOGRAM TRACING: CPT | Performed by: UROLOGY

## 2022-05-11 PROCEDURE — 93010 ELECTROCARDIOGRAM REPORT: CPT | Performed by: INTERNAL MEDICINE

## 2022-05-20 ENCOUNTER — OFFICE VISIT (OUTPATIENT)
Dept: FAMILY MEDICINE CLINIC | Age: 72
End: 2022-05-20
Payer: MEDICARE

## 2022-05-20 VITALS
TEMPERATURE: 97.7 F | OXYGEN SATURATION: 98 % | HEIGHT: 72 IN | HEART RATE: 82 BPM | BODY MASS INDEX: 31.42 KG/M2 | DIASTOLIC BLOOD PRESSURE: 80 MMHG | SYSTOLIC BLOOD PRESSURE: 118 MMHG | WEIGHT: 232 LBS

## 2022-05-20 DIAGNOSIS — E11.9 TYPE 2 DIABETES MELLITUS WITHOUT COMPLICATION, WITHOUT LONG-TERM CURRENT USE OF INSULIN (HCC): ICD-10-CM

## 2022-05-20 DIAGNOSIS — C61 PROSTATE CANCER (HCC): ICD-10-CM

## 2022-05-20 DIAGNOSIS — Z01.818 PREOP EXAMINATION: Primary | ICD-10-CM

## 2022-05-20 PROCEDURE — 2022F DILAT RTA XM EVC RTNOPTHY: CPT | Performed by: FAMILY MEDICINE

## 2022-05-20 PROCEDURE — 99212 OFFICE O/P EST SF 10 MIN: CPT | Performed by: FAMILY MEDICINE

## 2022-05-20 PROCEDURE — G8427 DOCREV CUR MEDS BY ELIG CLIN: HCPCS | Performed by: FAMILY MEDICINE

## 2022-05-20 PROCEDURE — G8417 CALC BMI ABV UP PARAM F/U: HCPCS | Performed by: FAMILY MEDICINE

## 2022-05-20 ASSESSMENT — ENCOUNTER SYMPTOMS
RHINORRHEA: 1
CONSTIPATION: 0
COUGH: 0
EYE ITCHING: 0
SHORTNESS OF BREATH: 0
DIARRHEA: 0
BACK PAIN: 0

## 2022-05-20 NOTE — PROGRESS NOTES
Preoperative Consultation    [address] [facility]  YOB: 1950    Mr. oGnzales Arevalo presents to the office today for a preoperative consultation at the request of surgeon, Dr. Petra Burnett, who plans on performing robotic laparoscopic radical prostatectomy on May 25, 2022. Planned anesthesia: General   Known anesthesia problems: None   Bleeding risk: No recent or remote history of abnormal bleeding  Personal or FH of DVT/PE: No      Patient Active Problem List   Diagnosis    Pure hypercholesterolemia    HTN (hypertension)    Type 2 diabetes mellitus without complication, without long-term current use of insulin (HCC)    Prostate cancer [facility])     Past Surgical History:   Procedure Laterality Date    FOOT SURGERY      repair of fallen arch and tendon repair    KNEE ARTHROSCOPY      Both knees R 2007, L 2017    SHOULDER SURGERY Right 10/30/2013       Allergies   Allergen Reactions    No Known Allergies      Outpatient Medications Marked as Taking for the 5/20/22 encounter (Office Visit) with Jailene Chatterjee MD   Medication Sig Dispense Refill    metFORMIN (GLUCOPHAGE-XR) 500 MG extended release tablet TAKE 2 TABLETS BY MOUTH DAILY (WITH BREAKFAST) 180 tablet 2    lisinopril (PRINIVIL;ZESTRIL) 10 MG tablet TAKE 1 TABLET BY MOUTH EVERY DAY 90 tablet 3    simvastatin (ZOCOR) 40 MG tablet TAKE 1 TABLET BY MOUTH EVERY DAY EVERY NIGHT 90 tablet 3    Multiple Vitamins-Minerals (THERAPEUTIC MULTIVITAMIN-MINERALS) tablet Take 1 tablet by mouth daily      Cholecalciferol (VITAMIN D3) 1000 units TABS Take 1 tablet by mouth daily 30 tablet 0       Social History     Tobacco Use    Smoking status: Never Smoker    Smokeless tobacco: Never Used   Substance Use Topics    Alcohol use: No     Family History   Problem Relation Age of Onset    Breast Cancer Mother     Heart Attack Mother     Diabetes Maternal Aunt        Review ofSystems  Review of Systems   Constitutional: Negative for activity change.    HENT: Positive for congestion, postnasal drip, rhinorrhea and sneezing. Eyes: Negative for itching. Respiratory: Negative for cough and shortness of breath. Cardiovascular: Negative for chest pain and leg swelling. Gastrointestinal: Negative for constipation and diarrhea. Genitourinary: Negative for difficulty urinating and frequency. Musculoskeletal: Positive for arthralgias ( knees). Negative for back pain. Neurological: Negative for dizziness and light-headedness. Psychiatric/Behavioral: Negative for dysphoric mood and sleep disturbance. The patient is not nervous/anxious. Physical Exam   /80   Pulse 82   Temp 97.7 °F (36.5 °C)   Ht 6' (1.829 m)   Wt 232 lb (105.2 kg)   SpO2 98%   BMI 31.46 kg/m² Weight: 232 lb (105.2 kg)   Physical Exam  Constitutional:       General: He is not in acute distress. Appearance: He is well-developed. He is obese. HENT:      Head: Normocephalic and atraumatic. Right Ear: Tympanic membrane and external ear normal.      Left Ear: Tympanic membrane and external ear normal.      Nose: Nose normal.      Mouth/Throat:      Mouth: Mucous membranes are moist.      Pharynx: No posterior oropharyngeal erythema. Eyes:      General:         Right eye: No discharge. Conjunctiva/sclera: Conjunctivae normal.   Neck:      Thyroid: No thyromegaly. Vascular: No carotid bruit or JVD. Trachea: No tracheal deviation. Cardiovascular:      Rate and Rhythm: Normal rate and regular rhythm. Pulses:           Dorsalis pedis pulses are 2+ on the right side and 2+ on the left side. Posterior tibial pulses are 2+ on the right side and 2+ on the left side. Heart sounds: Normal heart sounds. No murmur heard. No friction rub. No gallop. Pulmonary:      Effort: Pulmonary effort is normal. No respiratory distress. Breath sounds: Normal breath sounds. No rales. Musculoskeletal:         General: Normal range of motion. Cervical back: Normal range of motion and neck supple. Right lower leg: No edema. Left lower leg: No edema. Right foot: Normal.      Left foot: Normal.   Lymphadenopathy:      Cervical: No cervical adenopathy. Skin:     General: Skin is warm and dry. Neurological:      Mental Status: He is alert and oriented to person, place, and time. Sensory: Sensation is intact. Motor: Motor function is intact. Deep Tendon Reflexes: Reflexes are normal and symmetric. Comments: 12 point monofilament test normal    Psychiatric:         Mood and Affect: Mood normal.         Behavior: Behavior normal. Behavior is cooperative. Lab Review: CBC was completely normal with a hemoglobin of 13.9 hematocrit 42.4 platelet count 508,744 WBC 6.7. Fasting glucose was elevated 148. Creatinine normal at 0.9. Liver functions all normal INR 0.94    ASSESSMENT:    Tiffani Doss was seen today for pre-op exam.    Diagnoses and all orders for this visit:    Preop examination    Prostate cancer ([facility]    Type 2 diabetes mellitus without complication, without long-term current use of insulin ([facility]  -      DIABETES FOOT EXAM      80 y.o. patient approved forSurgery      PLAN:  1. Preoperative workup as follows: Per Surgeon and anesthesiologist  2. Change in medication regimen before surgery: Hold all medications on morning of surgery  3. No contraindications to planned surgery    Note electronically signed by provider. Please note portions ofthis note were completed with a voice recognition program.  Efforts were made to edit the dictations but occasionally words are mis-transcribed.

## 2022-05-24 ENCOUNTER — ANESTHESIA EVENT (OUTPATIENT)
Dept: OPERATING ROOM | Age: 72
DRG: 356 | End: 2022-05-24
Payer: MEDICARE

## 2022-05-25 ENCOUNTER — ANESTHESIA (OUTPATIENT)
Dept: OPERATING ROOM | Age: 72
DRG: 356 | End: 2022-05-25
Payer: MEDICARE

## 2022-05-25 ENCOUNTER — HOSPITAL ENCOUNTER (OUTPATIENT)
Age: 72
Setting detail: SURGERY ADMIT
Discharge: HOME | DRG: 356 | End: 2022-05-25
Attending: UROLOGY | Admitting: UROLOGY
Payer: MEDICARE

## 2022-05-25 VITALS
DIASTOLIC BLOOD PRESSURE: 70 MMHG | TEMPERATURE: 97.3 F | BODY MASS INDEX: 30.75 KG/M2 | OXYGEN SATURATION: 90 % | RESPIRATION RATE: 14 BRPM | HEIGHT: 72 IN | WEIGHT: 227 LBS | HEART RATE: 91 BPM | SYSTOLIC BLOOD PRESSURE: 138 MMHG

## 2022-05-25 DIAGNOSIS — Z01.818 PREOP TESTING: Primary | ICD-10-CM

## 2022-05-25 DIAGNOSIS — C61 PROSTATE CANCER (HCC): ICD-10-CM

## 2022-05-25 LAB
ABO/RH: NORMAL
ANTIBODY SCREEN: NORMAL
GLUCOSE BLD-MCNC: 136 MG/DL (ref 70–99)
PERFORMED ON: ABNORMAL

## 2022-05-25 PROCEDURE — 6370000000 HC RX 637 (ALT 250 FOR IP): Performed by: NURSE ANESTHETIST, CERTIFIED REGISTERED

## 2022-05-25 PROCEDURE — 86850 RBC ANTIBODY SCREEN: CPT

## 2022-05-25 PROCEDURE — 2709999900 HC NON-CHARGEABLE SUPPLY: Performed by: UROLOGY

## 2022-05-25 PROCEDURE — 6360000002 HC RX W HCPCS: Performed by: NURSE ANESTHETIST, CERTIFIED REGISTERED

## 2022-05-25 PROCEDURE — 86901 BLOOD TYPING SEROLOGIC RH(D): CPT

## 2022-05-25 PROCEDURE — 86900 BLOOD TYPING SEROLOGIC ABO: CPT

## 2022-05-25 PROCEDURE — 2720000010 HC SURG SUPPLY STERILE: Performed by: UROLOGY

## 2022-05-25 PROCEDURE — 6370000000 HC RX 637 (ALT 250 FOR IP): Performed by: ANESTHESIOLOGY

## 2022-05-25 PROCEDURE — C1760 CLOSURE DEV, VASC: HCPCS | Performed by: UROLOGY

## 2022-05-25 PROCEDURE — 88309 TISSUE EXAM BY PATHOLOGIST: CPT

## 2022-05-25 PROCEDURE — 3600000019 HC SURGERY ROBOT ADDTL 15MIN: Performed by: UROLOGY

## 2022-05-25 PROCEDURE — 88305 TISSUE EXAM BY PATHOLOGIST: CPT

## 2022-05-25 PROCEDURE — 7100000011 HC PHASE II RECOVERY - ADDTL 15 MIN: Performed by: UROLOGY

## 2022-05-25 PROCEDURE — 7100000000 HC PACU RECOVERY - FIRST 15 MIN: Performed by: UROLOGY

## 2022-05-25 PROCEDURE — 2500000003 HC RX 250 WO HCPCS: Performed by: NURSE ANESTHETIST, CERTIFIED REGISTERED

## 2022-05-25 PROCEDURE — 0VT04ZZ RESECTION OF PROSTATE, PERCUTANEOUS ENDOSCOPIC APPROACH: ICD-10-PCS | Performed by: UROLOGY

## 2022-05-25 PROCEDURE — 7100000001 HC PACU RECOVERY - ADDTL 15 MIN: Performed by: UROLOGY

## 2022-05-25 PROCEDURE — 2500000003 HC RX 250 WO HCPCS: Performed by: UROLOGY

## 2022-05-25 PROCEDURE — 07TC4ZZ RESECTION OF PELVIS LYMPHATIC, PERCUTANEOUS ENDOSCOPIC APPROACH: ICD-10-PCS | Performed by: UROLOGY

## 2022-05-25 PROCEDURE — 7100000010 HC PHASE II RECOVERY - FIRST 15 MIN: Performed by: UROLOGY

## 2022-05-25 PROCEDURE — 36415 COLL VENOUS BLD VENIPUNCTURE: CPT

## 2022-05-25 PROCEDURE — 3700000001 HC ADD 15 MINUTES (ANESTHESIA): Performed by: UROLOGY

## 2022-05-25 PROCEDURE — A4217 STERILE WATER/SALINE, 500 ML: HCPCS | Performed by: UROLOGY

## 2022-05-25 PROCEDURE — 2580000003 HC RX 258: Performed by: UROLOGY

## 2022-05-25 PROCEDURE — 8E0W4CZ ROBOTIC ASSISTED PROCEDURE OF TRUNK REGION, PERCUTANEOUS ENDOSCOPIC APPROACH: ICD-10-PCS | Performed by: UROLOGY

## 2022-05-25 PROCEDURE — 6370000000 HC RX 637 (ALT 250 FOR IP): Performed by: UROLOGY

## 2022-05-25 PROCEDURE — 3600000009 HC SURGERY ROBOT BASE: Performed by: UROLOGY

## 2022-05-25 PROCEDURE — 2580000003 HC RX 258: Performed by: NURSE ANESTHETIST, CERTIFIED REGISTERED

## 2022-05-25 PROCEDURE — 3700000000 HC ANESTHESIA ATTENDED CARE: Performed by: UROLOGY

## 2022-05-25 PROCEDURE — S2900 ROBOTIC SURGICAL SYSTEM: HCPCS | Performed by: UROLOGY

## 2022-05-25 PROCEDURE — 6360000002 HC RX W HCPCS: Performed by: UROLOGY

## 2022-05-25 PROCEDURE — C9290 INJ, BUPIVACAINE LIPOSOME: HCPCS | Performed by: UROLOGY

## 2022-05-25 RX ORDER — HYDROCODONE BITARTRATE AND ACETAMINOPHEN 5; 325 MG/1; MG/1
1 TABLET ORAL EVERY 6 HOURS PRN
Qty: 20 TABLET | Refills: 0 | Status: ON HOLD | OUTPATIENT
Start: 2022-05-25 | End: 2022-05-31 | Stop reason: HOSPADM

## 2022-05-25 RX ORDER — LIDOCAINE HYDROCHLORIDE 10 MG/ML
0.5 INJECTION, SOLUTION EPIDURAL; INFILTRATION; INTRACAUDAL; PERINEURAL ONCE
Status: DISCONTINUED | OUTPATIENT
Start: 2022-05-25 | End: 2022-05-25 | Stop reason: HOSPADM

## 2022-05-25 RX ORDER — MAGNESIUM SULFATE HEPTAHYDRATE 500 MG/ML
INJECTION, SOLUTION INTRAMUSCULAR; INTRAVENOUS PRN
Status: DISCONTINUED | OUTPATIENT
Start: 2022-05-25 | End: 2022-05-25 | Stop reason: SDUPTHER

## 2022-05-25 RX ORDER — MAGNESIUM HYDROXIDE 1200 MG/15ML
LIQUID ORAL CONTINUOUS PRN
Status: COMPLETED | OUTPATIENT
Start: 2022-05-25 | End: 2022-05-25

## 2022-05-25 RX ORDER — DEXAMETHASONE SODIUM PHOSPHATE 4 MG/ML
INJECTION, SOLUTION INTRA-ARTICULAR; INTRALESIONAL; INTRAMUSCULAR; INTRAVENOUS; SOFT TISSUE PRN
Status: DISCONTINUED | OUTPATIENT
Start: 2022-05-25 | End: 2022-05-25 | Stop reason: SDUPTHER

## 2022-05-25 RX ORDER — HYDRALAZINE HYDROCHLORIDE 20 MG/ML
10 INJECTION INTRAMUSCULAR; INTRAVENOUS
Status: DISCONTINUED | OUTPATIENT
Start: 2022-05-25 | End: 2022-05-25 | Stop reason: HOSPADM

## 2022-05-25 RX ORDER — ATROPA BELLADONNA AND OPIUM 16.2; 6 MG/1; MG/1
SUPPOSITORY RECTAL
Status: COMPLETED | OUTPATIENT
Start: 2022-05-25 | End: 2022-05-25

## 2022-05-25 RX ORDER — HYDROMORPHONE HCL 110MG/55ML
PATIENT CONTROLLED ANALGESIA SYRINGE INTRAVENOUS PRN
Status: DISCONTINUED | OUTPATIENT
Start: 2022-05-25 | End: 2022-05-25 | Stop reason: SDUPTHER

## 2022-05-25 RX ORDER — AMOXICILLIN 250 MG
1 CAPSULE ORAL 2 TIMES DAILY
Qty: 30 TABLET | Refills: 1 | Status: SHIPPED | OUTPATIENT
Start: 2022-05-25 | End: 2022-06-09

## 2022-05-25 RX ORDER — PROPOFOL 10 MG/ML
INJECTION, EMULSION INTRAVENOUS PRN
Status: DISCONTINUED | OUTPATIENT
Start: 2022-05-25 | End: 2022-05-25 | Stop reason: SDUPTHER

## 2022-05-25 RX ORDER — BUPIVACAINE HYDROCHLORIDE 5 MG/ML
INJECTION, SOLUTION EPIDURAL; INTRACAUDAL
Status: COMPLETED | OUTPATIENT
Start: 2022-05-25 | End: 2022-05-25

## 2022-05-25 RX ORDER — ONDANSETRON 2 MG/ML
INJECTION INTRAMUSCULAR; INTRAVENOUS PRN
Status: DISCONTINUED | OUTPATIENT
Start: 2022-05-25 | End: 2022-05-25 | Stop reason: SDUPTHER

## 2022-05-25 RX ORDER — GLYCOPYRROLATE 1 MG/5 ML
SYRINGE (ML) INTRAVENOUS PRN
Status: DISCONTINUED | OUTPATIENT
Start: 2022-05-25 | End: 2022-05-25 | Stop reason: SDUPTHER

## 2022-05-25 RX ORDER — SODIUM CHLORIDE 9 MG/ML
INJECTION, SOLUTION INTRAVENOUS CONTINUOUS
Status: DISCONTINUED | OUTPATIENT
Start: 2022-05-25 | End: 2022-05-25 | Stop reason: HOSPADM

## 2022-05-25 RX ORDER — FENTANYL CITRATE 50 UG/ML
25 INJECTION, SOLUTION INTRAMUSCULAR; INTRAVENOUS EVERY 5 MIN PRN
Status: DISCONTINUED | OUTPATIENT
Start: 2022-05-25 | End: 2022-05-25 | Stop reason: HOSPADM

## 2022-05-25 RX ORDER — HYDROMORPHONE HCL 110MG/55ML
0.5 PATIENT CONTROLLED ANALGESIA SYRINGE INTRAVENOUS EVERY 5 MIN PRN
Status: DISCONTINUED | OUTPATIENT
Start: 2022-05-25 | End: 2022-05-25 | Stop reason: HOSPADM

## 2022-05-25 RX ORDER — ALBUTEROL SULFATE 90 UG/1
AEROSOL, METERED RESPIRATORY (INHALATION) PRN
Status: DISCONTINUED | OUTPATIENT
Start: 2022-05-25 | End: 2022-05-25 | Stop reason: SDUPTHER

## 2022-05-25 RX ORDER — PROCHLORPERAZINE EDISYLATE 5 MG/ML
5 INJECTION INTRAMUSCULAR; INTRAVENOUS
Status: DISCONTINUED | OUTPATIENT
Start: 2022-05-25 | End: 2022-05-25 | Stop reason: HOSPADM

## 2022-05-25 RX ORDER — ONDANSETRON 2 MG/ML
4 INJECTION INTRAMUSCULAR; INTRAVENOUS
Status: DISCONTINUED | OUTPATIENT
Start: 2022-05-25 | End: 2022-05-25 | Stop reason: HOSPADM

## 2022-05-25 RX ORDER — SUCCINYLCHOLINE/SOD CL,ISO/PF 200MG/10ML
SYRINGE (ML) INTRAVENOUS PRN
Status: DISCONTINUED | OUTPATIENT
Start: 2022-05-25 | End: 2022-05-25 | Stop reason: SDUPTHER

## 2022-05-25 RX ORDER — LABETALOL HYDROCHLORIDE 5 MG/ML
10 INJECTION, SOLUTION INTRAVENOUS
Status: DISCONTINUED | OUTPATIENT
Start: 2022-05-25 | End: 2022-05-25 | Stop reason: HOSPADM

## 2022-05-25 RX ORDER — OXYCODONE HYDROCHLORIDE 5 MG/1
5 TABLET ORAL
Status: COMPLETED | OUTPATIENT
Start: 2022-05-25 | End: 2022-05-25

## 2022-05-25 RX ORDER — SODIUM CHLORIDE 9 MG/ML
INJECTION, SOLUTION INTRAVENOUS CONTINUOUS PRN
Status: DISCONTINUED | OUTPATIENT
Start: 2022-05-25 | End: 2022-05-25 | Stop reason: SDUPTHER

## 2022-05-25 RX ORDER — KETAMINE HCL IN NACL, ISO-OSM 100MG/10ML
SYRINGE (ML) INJECTION PRN
Status: DISCONTINUED | OUTPATIENT
Start: 2022-05-25 | End: 2022-05-25 | Stop reason: SDUPTHER

## 2022-05-25 RX ORDER — FENTANYL CITRATE 50 UG/ML
INJECTION, SOLUTION INTRAMUSCULAR; INTRAVENOUS PRN
Status: DISCONTINUED | OUTPATIENT
Start: 2022-05-25 | End: 2022-05-25 | Stop reason: SDUPTHER

## 2022-05-25 RX ORDER — VECURONIUM BROMIDE 1 MG/ML
INJECTION, POWDER, LYOPHILIZED, FOR SOLUTION INTRAVENOUS PRN
Status: DISCONTINUED | OUTPATIENT
Start: 2022-05-25 | End: 2022-05-25 | Stop reason: SDUPTHER

## 2022-05-25 RX ORDER — LIDOCAINE HYDROCHLORIDE 10 MG/ML
1 INJECTION, SOLUTION EPIDURAL; INFILTRATION; INTRACAUDAL; PERINEURAL
Status: CANCELLED | OUTPATIENT
Start: 2022-05-25 | End: 2022-05-25

## 2022-05-25 RX ADMIN — FENTANYL CITRATE 50 MCG: 50 INJECTION, SOLUTION INTRAMUSCULAR; INTRAVENOUS at 07:44

## 2022-05-25 RX ADMIN — MAGNESIUM SULFATE HEPTAHYDRATE 1 G: 500 INJECTION, SOLUTION INTRAMUSCULAR; INTRAVENOUS at 07:58

## 2022-05-25 RX ADMIN — VECURONIUM BROMIDE 5 MG: 1 INJECTION, POWDER, LYOPHILIZED, FOR SOLUTION INTRAVENOUS at 07:55

## 2022-05-25 RX ADMIN — Medication 20 MG: at 07:58

## 2022-05-25 RX ADMIN — Medication 140 MG: at 07:44

## 2022-05-25 RX ADMIN — CEFAZOLIN 2000 MG: 2 INJECTION, POWDER, FOR SOLUTION INTRAMUSCULAR; INTRAVENOUS at 07:31

## 2022-05-25 RX ADMIN — Medication 0.2 MG: at 08:08

## 2022-05-25 RX ADMIN — VECURONIUM BROMIDE 2 MG: 1 INJECTION, POWDER, LYOPHILIZED, FOR SOLUTION INTRAVENOUS at 09:29

## 2022-05-25 RX ADMIN — ALBUTEROL SULFATE 8 PUFF: 90 AEROSOL, METERED RESPIRATORY (INHALATION) at 08:16

## 2022-05-25 RX ADMIN — PROPOFOL 200 MG: 10 INJECTION, EMULSION INTRAVENOUS at 07:44

## 2022-05-25 RX ADMIN — HYDROMORPHONE HYDROCHLORIDE 0.5 MG: 2 INJECTION, SOLUTION INTRAMUSCULAR; INTRAVENOUS; SUBCUTANEOUS at 10:32

## 2022-05-25 RX ADMIN — DEXAMETHASONE SODIUM PHOSPHATE 4 MG: 4 INJECTION, SOLUTION INTRAMUSCULAR; INTRAVENOUS at 08:03

## 2022-05-25 RX ADMIN — HYDROMORPHONE HYDROCHLORIDE 0.5 MG: 2 INJECTION, SOLUTION INTRAMUSCULAR; INTRAVENOUS; SUBCUTANEOUS at 10:39

## 2022-05-25 RX ADMIN — Medication 10 MG: at 10:13

## 2022-05-25 RX ADMIN — VECURONIUM BROMIDE 1 MG: 1 INJECTION, POWDER, LYOPHILIZED, FOR SOLUTION INTRAVENOUS at 07:44

## 2022-05-25 RX ADMIN — SODIUM CHLORIDE: 9 INJECTION, SOLUTION INTRAVENOUS at 06:31

## 2022-05-25 RX ADMIN — SODIUM CHLORIDE: 9 INJECTION, SOLUTION INTRAVENOUS at 07:34

## 2022-05-25 RX ADMIN — SODIUM CHLORIDE: 9 INJECTION, SOLUTION INTRAVENOUS at 06:32

## 2022-05-25 RX ADMIN — ONDANSETRON 4 MG: 2 INJECTION INTRAMUSCULAR; INTRAVENOUS at 10:13

## 2022-05-25 RX ADMIN — SUGAMMADEX 200 MG: 100 INJECTION, SOLUTION INTRAVENOUS at 10:37

## 2022-05-25 RX ADMIN — OXYCODONE 5 MG: 5 TABLET ORAL at 13:57

## 2022-05-25 RX ADMIN — FENTANYL CITRATE 50 MCG: 50 INJECTION, SOLUTION INTRAMUSCULAR; INTRAVENOUS at 08:27

## 2022-05-25 RX ADMIN — HYDROMORPHONE HYDROCHLORIDE 0.5 MG: 2 INJECTION, SOLUTION INTRAMUSCULAR; INTRAVENOUS; SUBCUTANEOUS at 10:41

## 2022-05-25 RX ADMIN — VECURONIUM BROMIDE 4 MG: 1 INJECTION, POWDER, LYOPHILIZED, FOR SOLUTION INTRAVENOUS at 08:18

## 2022-05-25 RX ADMIN — HYDROMORPHONE HYDROCHLORIDE 0.5 MG: 2 INJECTION, SOLUTION INTRAMUSCULAR; INTRAVENOUS; SUBCUTANEOUS at 10:42

## 2022-05-25 RX ADMIN — Medication 20 MG: at 08:27

## 2022-05-25 ASSESSMENT — PAIN SCALES - GENERAL
PAINLEVEL_OUTOF10: 10
PAINLEVEL_OUTOF10: 0

## 2022-05-25 ASSESSMENT — ENCOUNTER SYMPTOMS: SHORTNESS OF BREATH: 0

## 2022-05-25 ASSESSMENT — PAIN - FUNCTIONAL ASSESSMENT: PAIN_FUNCTIONAL_ASSESSMENT: NONE - DENIES PAIN

## 2022-05-25 ASSESSMENT — PAIN DESCRIPTION - DESCRIPTORS: DESCRIPTORS: ACHING

## 2022-05-25 ASSESSMENT — PAIN DESCRIPTION - LOCATION: LOCATION: ABDOMEN

## 2022-05-25 NOTE — ANESTHESIA PRE PROCEDURE
Department of Anesthesiology  Preprocedure Note       Name:  Jagdish Gray   Age:  80 y.o.  :  1950                                          MRN:  [id number]         Date:  2022      Surgeon: Catalina Hinds):  Tahira Davies MD    Procedure: Procedure(s):  ROBOTIC LAPAROSCOPIC RADICAL PROSTATECTOMY WITH BILATERAL LYMPH NODE DISSECTION AND BILATERAL NERVE SPARE    Medications prior to admission:   Prior to Admission medications    Medication Sig Start Date End Date Taking? Authorizing Provider   metFORMIN (GLUCOPHAGE-XR) 500 MG extended release tablet TAKE 2 TABLETS BY MOUTH DAILY (WITH BREAKFAST) 21  JUVENCIO Caballero CNP   lisinopril (PRINIVIL;ZESTRIL) 10 MG tablet TAKE 1 TABLET BY MOUTH EVERY DAY 21   Lien Hamilton MD   simvastatin (ZOCOR) 40 MG tablet TAKE 1 TABLET BY MOUTH EVERY DAY EVERY NIGHT 21   Lien Hamilton MD   Multiple Vitamins-Minerals (THERAPEUTIC MULTIVITAMIN-MINERALS) tablet Take 1 tablet by mouth daily    Historical Provider, MD   Cholecalciferol (VITAMIN D3) 1000 units TABS Take 1 tablet by mouth daily 18   Tristan Brunson MD       Current medications:    Current Facility-Administered Medications   Medication Dose Route Frequency Provider Last Rate Last Admin    0.9 % sodium chloride infusion   IntraVENous Continuous Tahira Davies MD 50 mL/hr at 22 0632 New Bag at 22 8536    lidocaine PF 1 % injection 0.5 mL  0.5 mL IntraDERmal Once Tahira Davies MD        ceFAZolin (ANCEF) 2,000 mg in dextrose 5 % 50 mL IVPB (mini-bag)  2,000 mg IntraVENous On Call to [address] [place] [address] Katy Rios MD           Allergies:     Allergies   Allergen Reactions    No Known Allergies        Problem List:    Patient Active Problem List   Diagnosis Code    Pure hypercholesterolemia E78.00    HTN (hypertension) I10    Type 2 diabetes mellitus without complication, without long-term current use of insulin [facility] E11.9    Prostate cancer ([facility] C61       Past Medical History:        Diagnosis Date    Hyperlipidemia     Hypertension     Type II or unspecified type diabetes mellitus without mention of complication, not stated as uncontrolled        Past Surgical History:        Procedure Laterality Date    FOOT SURGERY      repair of fallen arch and tendon repair    KNEE ARTHROSCOPY      Both knees R 2007, L 2017    SHOULDER SURGERY Right 10/30/2013       Social History:    Social History     Tobacco Use    Smoking status: Never Smoker    Smokeless tobacco: Never Used   Substance Use Topics    Alcohol use: No                                Counseling given: Not Answered      Vital Signs (Current):   Vitals:    05/02/22 0954   Weight: 225 lb (102.1 kg)   Height: 6' (1.829 m)                                              BP Readings from Last 3 Encounters:   05/20/22 118/80   02/18/22 126/80   08/18/21 110/70       NPO Status: Time of last liquid consumption: 0300 (gaterade)                        Time of last solid consumption: 2100                        Date of last liquid consumption: 05/25/22                        Date of last solid food consumption: 05/24/22    BMI:   Wt Readings from Last 3 Encounters:   05/02/22 225 lb (102.1 kg)   05/20/22 232 lb (105.2 kg)   02/18/22 229 lb (103.9 kg)     Body mass index is 30.52 kg/m².     CBC:   Lab Results   Component Value Date    WBC 6.7 05/11/2022    RBC 4.66 05/11/2022    HGB 13.9 05/11/2022    HCT 42.4 05/11/2022    MCV 91.1 05/11/2022    RDW 14.2 05/11/2022     05/11/2022       CMP:   Lab Results   Component Value Date     05/11/2022    K 4.7 05/11/2022     05/11/2022    CO2 24 05/11/2022    BUN 12 05/11/2022    CREATININE 0.9 05/11/2022    GFRAA >60 05/11/2022    GFRAA >60 03/16/2013    AGRATIO 1.9 05/11/2022    LABGLOM >60 05/11/2022    GLUCOSE 148 05/11/2022    PROT 6.9 05/11/2022    PROT 7.4 03/16/2013    CALCIUM 9.6 05/11/2022    BILITOT 0.6 05/11/2022    ALKPHOS 62 05/11/2022    AST 21 05/11/2022    ALT 29 05/11/2022       POC Tests:   Recent Labs     05/25/22  4858   POCGLU 136*       Coags:   Lab Results   Component Value Date    PROTIME 10.6 05/11/2022    INR 0.94 05/11/2022    APTT 35.2 05/11/2022       HCG (If Applicable): No results found for: PREGTESTUR, PREGSERUM, HCG, HCGQUANT     ABGs: No results found for: PHART, PO2ART, CUH3CHA, EWQ9NKV, BEART, E8QMNXLQ     Type & Screen (If Applicable):  No results found for: LABABO, LABRH    Drug/Infectious Status (If Applicable):  No results found for: HIV, HEPCAB    COVID-19 Screening (If Applicable): No results found for: COVID19        Anesthesia Evaluation  Patient summary reviewed and Nursing notes reviewed no history of anesthetic complications:   Airway: Mallampati: I  TM distance: >3 FB   Neck ROM: full  Mouth opening: > = 3 FB   Dental: normal exam         Pulmonary:       (-) asthma and shortness of breath                           Cardiovascular:    (+) hypertension:, hyperlipidemia    (-)  angina                Neuro/Psych:      (-) CVA           GI/Hepatic/Renal:        (-) GERD and liver disease       Endo/Other:    (+) DiabetesType II DM, , malignancy/cancer. (-) hypothyroidism               Abdominal:             Vascular:     - PVD. Other Findings:           Anesthesia Plan      general     ASA 3       Induction: intravenous. MIPS: Postoperative opioids intended and Prophylactic antiemetics administered. Anesthetic plan and risks discussed with patient. Use of blood products discussed with patient whom. Plan discussed with CRNA.                     Rudi Vasquez MD   5/25/2022

## 2022-05-25 NOTE — ANESTHESIA POSTPROCEDURE EVALUATION
Department of Anesthesiology  Postprocedure Note    Patient: Jody Payan  MRN: [id number]  YOB: 1950  Date of evaluation: 5/25/2022  Time:  12:25 PM     Procedure Summary     Date: 05/25/22 Room / Location: [address]    Anesthesia Start: 2381 Anesthesia Stop: 2299    Procedure: ROBOTIC LAPAROSCOPIC RADICAL PROSTATECTOMY WITH BILATERAL LYMPH NODE DISSECTION AND BILATERAL NERVE SPARE (N/A Abdomen) Diagnosis: ([address] [place] [address] MALIGNANT NEOPLASM PROSTATE)    Surgeons: Coral Moreno MD Responsible Provider: Jonny Pagan MD    Anesthesia Type: general ASA Status: 3          Anesthesia Type: No value filed. Ned Phase I: Ned Score: 7    Ned Phase II:      Last vitals: Reviewed and per EMR flowsheets.        Anesthesia Post Evaluation    Patient location during evaluation: PACU  Patient participation: complete - patient participated  Level of consciousness: awake and alert  Airway patency: patent  Nausea & Vomiting: no nausea and no vomiting  Complications: no  Cardiovascular status: blood pressure returned to baseline  Respiratory status: acceptable  Hydration status: euvolemic  Multimodal analgesia pain management approach

## 2022-05-25 NOTE — OP NOTE
Urology Operative Report  [facility]    Provider: Tim Cortes MD MD Patient ID:  Admission Date: 2022 Name: Sangeeta Reaves Date: 2022  MRN: [id number]   Patient Location: OR/NONE : 1950  Attending: Tim Cortes MD Date of Service: 2022  PCP: Dony Singh MD     Date of Operation: 2022     Preoperative Diagnosis: Prostate Cancer    Postoperative Diagnosis: same    Procedure:    1. Robotic assisted laparoscopic radical prostatectomy  2. Right side pelvic lymphadenectomy  3. Left side pelvic lymphadenectomy    Surgeon:   Tim Cortes MD, GUILLERMO    Anesthesia: General endotracheal anesthesia    Indications: Isaac Joyce is a 80 y.o. male who presents for the above named surgery. Informed consent was obtained and the risks, benefits, and details of the procedure were explained to the patient who elected to proceed. Details of Procedure:  After informed consent was obtained, making the patient aware of the risks, benefits and alternatives to the procedure, he was taken back to the surgical suite and positioned in a supine position on the surgical table. SCDs were placed on the lower extremities. General anesthesia was induced, and he was transferred to a dorsal lithotomy position. All pressure points were carefully padded. His genitalia and abdomen were prepped and draped in the usual sterile fashion. A timeout procedure was performed, properly identifying the patient, procedure, and operative site. A simpson catheter was placed and the bladder was drained. A supraumbilical incision was made, and a Veress needle was used to introduce pneumoperitoneum requiring 1 attempt(s). A camera port was then introduced into the abdomen, and the intra-abdominal contents were inspected for any sign of injury. There was none.  The laparoscopic ports were placed in the usual fashion, two 8 mm ports in the left lower quadrant, one 8 mm port in the right lower quadrant, an assistant port in the lateral right lower quadrant, and a 5 mm port in the right upper quadrant. The patient was put in steep Trendelenburg position and the robot was docked. The laparoscopic instruments were introduced into the abdomen under direct vision. The bilateral lymphadenectomy was done first.  Beginning with the right side, the peritoneum was incised over the iliac vessles. The lymph node packet margins were the external iliac vessels, obturator nerve, internal iliac artery, bladder medially, and pelvic floor. The obturator neve was identified and spared. Surgicel was placed into the dissection bed. We then performed lymphadenectomy of the left side. The peritoneum was incised over the iliac vessles. The lymph node packet margins were the external iliac vessels, obturator nerve, internal iliac artery, bladder medially, and pelvic floor. The obturator neve was identified and spared. Surgicel was placed into the dissection bed. The left and right side lymph node packets were then placed in a specimen pouch. A retrovesical approach to the prostate was used. An incision was made in the peritoneum and the bilateral seminal vesicles and vas deferens were identified in the midline. The vas was followed out laterally and cut. The seminal vesicles were freed from all surrounding attachments. Care was taken on the side of attempted nerve sparing to avoid using electrical energy. Denonvier's fascia was incised and the posterior plane of dissection extended distally to the apex of the prostate. The bladder was then dropped from its retropubic location and bladder attachments were taken down until the endopelvic fascia was identified. The endopelvic fascia was incised and lateral prostate attachments taken down allowing completion of the lateral dissection of the prostate. This was performed bilaterally.  The anterior prostate fat was cleaned off the prostate and removed by the bedside assistant. A dorsal vein stitch of 2-0 vicryl was used to tie off the vascular complex. At this point, attention was turned to the bladder neck. The prostato-vesicle junction was identified and the Costa balloon was deflated. The bladder neck was opened using the monopolar scissors. Care was taken to preserve the bladder neck. The urethral catheter was pulled back and the prostate elevated under tension using the fourth arm. The posterior bladder neck was then divided until the bilateral seminal vesicles and bilateral vas deferens were identified in the midline. They were delivered into the anterior surgical field. A nerve-sparing procedure was attempted on both side. The nervous tissues were carefully dissected off the lateral aspect of the prostate. No electrical energy was used, and minimal traction was applied. The lateral pelvic vascular pedicles were taken down using a combination of bipolar electrocautery with hem-o-lock clips, completing the posterior-lateral dissections. The dorsal vein complex was transected followed by transection of the urethral-prostatic junction using sharp dissection with the monopolar scissors. An adequate stump of urethra was left in place. The prostate was completely free and was placed into a specimen bag. Hemostasis was ensured. A Ike stitch was then placed using a 3-0 stratafix stitch to reapproximate the posterior bladder fascia with the posterior periurethral fascia. The urethrovesical anastomosis was accomplished using two interlocked 3-0 stratafix stitches in a running fashion starting at the 6 o'clock position and running circumferentially to the 12 o'clock position. These were tied in the midline. After completion of the anastomosis, a new 18-Albanian Costa catheter was placed into the bladder and the balloon was inflated with 10mL of sterile water. The bladder was irrigated with 150 mL of normal saline. There was no evidence of any leakage.  The catheter was allowed to remain in the bladder. Hemostasis was again ensured. The midline incision was extended, allowing delivery of the specimen bags through the midline. The midline incision was closed with 0-PDS interrupted stitches for the fascial level followed by 3-0 Vicryl deep dermal stitches. All incisions were closed with 4-0 monofilament and dermabond. The new 18-Lao Costa catheter had a statLock positioned. At the end of the procedure all needle, lap, instrument and sponge counts were correct. The patient tolerated the procedure well, was extubated without issue in the operating room, and was transported to the PACU in stable condition. Findings: There did appear to be an adequate surgical margin. There was a watertight urethral vesicle anastomosis. Estimated Blood Loss: Approximately 50 mL                  Drains:   18 Fr urethral Costa catheter to gravity drainage          Specimens:   1. Prostate, bilateral seminal vesicles, and anterior prostate fat. 2. Right side pelvic lymph nodes. 3. Left side pelvic lymph nodes. Complications: none apparent           Disposition:  PACU - hemodynamically stable. Plan:  Costa x1 week, follow-up pathology           Josie Craft MD, [address]  5/25/2022

## 2022-05-25 NOTE — PROGRESS NOTES
Patient is awakening, c/o some cramping but denies any acute pain. Abd remains soft, lap sites intact. Costa draining.

## 2022-05-25 NOTE — PROGRESS NOTES
Patient to PACU from OR. Drowsy. VSS. Abd soft, lap sites intact. Costa in place - urine pink/clear. Will monitor.

## 2022-05-25 NOTE — H&P
Urology Preoperative History & Physical  [facility]     Patient: Elizabeth Saavedra MRN: [id number]  Room/Bed: OR/NONE   YOB: 1950  Age/Sex: 80 y.o.male  Admission Date: 5/25/2022     Date of Service:  5/25/2022    ASSESSMENT/PLAN     PCa here for RALP/BPLND    Plan: To OR for above procedure    All patient questions were answered. He understands the plan as listed above. HISTORY     Chief Complaint: As above    History of Present Illness: Elizabeth Saavedra is a 80 y.o. male with above listed problems. No changes in history/physical since last evaluation. No change in symptoms. Past Medical History:  He has a past medical history of Hyperlipidemia, Hypertension, and Type II or unspecified type diabetes mellitus without mention of complication, not stated as uncontrolled. Hospital Problem List:  Active Problems:    * No active hospital problems. *  Resolved Problems:    * No resolved hospital problems. *      Past Surgical History:  He has a past surgical history that includes shoulder surgery (Right, 10/30/2013); Foot surgery; and Knee arthroscopy. Social History:  He reports that he has never smoked. He has never used smokeless tobacco. He reports that he does not drink alcohol and does not use drugs. Family History:  family history includes Breast Cancer in his mother; Diabetes in his maternal aunt; Heart Attack in his mother. Allergies: Allergies   Allergen Reactions    No Known Allergies        Medications:  Scheduled Meds:   lidocaine PF  0.5 mL IntraDERmal Once    ceFAZolin  2,000 mg IntraVENous On Call to OR     Continuous Infusions:   sodium chloride 50 mL/hr at 05/25/22 3035     PRN Meds:    Review of Systems:  Pertinent positives/negatives reviewed in HPI. All other systems reviewed and negative, unless noted below.     Constitutional: Negative  Genitourinary: see HPI  HEENT: Negative   Cardiovascular: Negative   Respiratory: Negative Gastrointestinal: Negative   Musculoskeletal: Negative   Neurological: Negative   Psychiatric: Negative   Integumentary: Negative     PHYSICAL EXAM     There were no vitals filed for this visit. CONSTITUTIONAL: The patient is well nourished/developed, with no distress noted. CARDIOVASCULAR: normal rate. RESPIRATOR: non-labored breathing. GENITOURINARY: Defer to OR; see clinic note for  exam.    Ins/Outs:  No intake or output data in the 24 hours ending 05/25/22 0728    LABS     CBC   Lab Results   Component Value Date    WBC 6.7 05/11/2022    RBC 4.66 05/11/2022    HGB 13.9 05/11/2022    HCT 42.4 05/11/2022    MCV 91.1 05/11/2022    MCH 29.9 05/11/2022    MCHC 32.8 05/11/2022    RDW 14.2 05/11/2022     05/11/2022    MPV 8.9 05/11/2022     BMP   Lab Results   Component Value Date     05/11/2022    K 4.7 05/11/2022     05/11/2022    CO2 24 05/11/2022    BUN 12 05/11/2022    CREATININE 0.9 05/11/2022    GLUCOSE 148 05/11/2022    CALCIUM 9.6 05/11/2022     Urinalysis: No results found for: COLORU, GLUCOSEU, BLOODU, NITRU, LEUKOCYTESUR  Urine culture: No results for input(s): [place] in the last 72 hours. PSA:   Lab Results   Component Value Date    PSA 5.92 (H) 02/12/2022    PSA 4.95 (H) 04/24/2021    PSA 3.02 04/18/2019         IMAGING     XR CHEST (2 VW)    Result Date: 5/11/2022  EXAMINATION: TWO XRAY VIEWS OF THE CHEST 5/11/2022 8:50 am COMPARISON: Chest x-ray dated 12/11/2017. HISTORY: ORDERING SYSTEM PROVIDED HISTORY: Preop testing TECHNOLOGIST PROVIDED HISTORY: Reason for Exam: Preop testing FINDINGS: HEART/MEDIASTINUM: The cardiomediastinal silhouette is within normal limits. PLEURA/LUNGS: There are no focal consolidations or pleural effusions. There is no appreciable pneumothorax. BONES/SOFT TISSUE: No acute abnormality. No radiographic evidence of acute pulmonary disease.      CT ABDOMEN PELVIS W IV CONTRAST Additional Contrast? Radiologist Recommendation    Result Date: 4/26/2022  EXAMINATION: CT OF THE ABDOMEN AND PELVIS WITH CONTRAST 4/26/2022 7:33 am TECHNIQUE: CT of the abdomen and pelvis was performed with the administration of intravenous contrast. Multiplanar reformatted images are provided for review. Dose modulation, iterative reconstruction, and/or weight based adjustment of the mA/kV was utilized to reduce the radiation dose to as low as reasonably achievable. COMPARISON: None. HISTORY: ORDERING SYSTEM PROVIDED HISTORY: Malignant neoplasm of prostate [facility] TECHNOLOGIST PROVIDED HISTORY: Additional Contrast?->Radiologist Recommendation STAT Creatinine as needed:->No Reason for Exam: Malignant neoplasm of prostate , Elevated prostate specific antigen FINDINGS: Lower Chest: There is no consolidation or effusion. Organs: There is a benign 2.8 cm left adrenal myelolipoma. The liver, pancreas, spleen, kidneys and right adrenal are unremarkable aside from a few tiny indeterminate hepatic hypodensities measuring approximately 2-3 mm each. These are too small to characterize by CT criteria. GI/Bowel: There is no bowel dilatation, wall thickening or obstruction. Diverticular changes are noted throughout the left hemicolon. Pelvis: The bladder and prostate are unremarkable. Peritoneum/Retroperitoneum: There is no free air, free fluid or intraperitoneal inflammatory change. There is no adenopathy. Bones/Soft Tissues: There is a solitary 4 mm sclerotic lesion within the left posterior iliac bone which is nonspecific. The remainder the visualized osseous structures are unremarkable. 1. Indeterminate small hepatic hypodensities. MRI of the liver and or short-term CT follow-up recommended for further evaluation. 2. Tiny solitary nonspecific left iliac sclerotic focus. Please refer to same day bone scan for further characterization.      NM BONE SCAN WHOLE BODY    Result Date: 4/26/2022  EXAMINATION: WHOLE BODY BONE SCAN  4/26/2022 TECHNIQUE: The patient was injected intravenously with 26.44 mCi of 99 mTc MDP and scintigraphy of the entire skeleton was performed approximately three hours later. COMPARISON: CT abdomen pelvis dated 04/26/2022 HISTORY: ORDERING SYSTEM PROVIDED HISTORY: Malignant neoplasm of prostate [facility]) TECHNOLOGIST PROVIDED HISTORY: Reason for Exam: Malignant Neoplasm of Prostate; Elevated PSA FINDINGS: Focal activity is demonstrated within the upper lumbar spine at the midline and to the left of midline where spurring and vacuum phenomenon is seen on CT at L1-L2, favoring a degenerative etiology. Mild diffuse activity throughout the remainder of the spine, likely degenerative. Activity at bilateral shoulders, sternoclavicular joints, elbows, wrists, hips, knees, feet, likely degenerative. Physiologic activity is seen within the kidneys. Bladder has been masked. Multifocal degenerative change, without a generalized scintigraphic pattern of osseous metastatic disease.             Electronically signed by: Dixie Kan MD MD, GUILLERMO 5/25/2022   [facility]  Office Contact: [phone]

## 2022-05-25 NOTE — PROGRESS NOTES
Phase 1 recovery completed, will transfer to [facility]. Plan from Dr. Marvine Kocher to send patient home today.

## 2022-05-25 NOTE — PROGRESS NOTES
Discharge instructions reviewed with patient and patient wife and daughter. All questions answered. Leg bag teaching completed. Pt abdomen incisions remain CDI. IV's removed.

## 2022-05-27 ENCOUNTER — APPOINTMENT (OUTPATIENT)
Dept: CT IMAGING | Age: 72
DRG: 356 | End: 2022-05-27
Payer: MEDICARE

## 2022-05-27 ENCOUNTER — HOSPITAL ENCOUNTER (INPATIENT)
Age: 72
LOS: 4 days | Discharge: HOME | DRG: 356 | End: 2022-05-31
Attending: EMERGENCY MEDICINE | Admitting: INTERNAL MEDICINE
Payer: MEDICARE

## 2022-05-27 ENCOUNTER — APPOINTMENT (OUTPATIENT)
Dept: GENERAL RADIOLOGY | Age: 72
DRG: 356 | End: 2022-05-27
Payer: MEDICARE

## 2022-05-27 DIAGNOSIS — I26.99 BILATERAL PULMONARY EMBOLISM (HCC): ICD-10-CM

## 2022-05-27 DIAGNOSIS — K91.89 POSTOPERATIVE ILEUS (HCC): Primary | ICD-10-CM

## 2022-05-27 DIAGNOSIS — K56.7 POSTOPERATIVE ILEUS (HCC): Primary | ICD-10-CM

## 2022-05-27 LAB
A/G RATIO: 1.1 (ref 1.1–2.2)
ALBUMIN SERPL-MCNC: 4.1 G/DL (ref 3.4–5)
ALP BLD-CCNC: 52 U/L (ref 40–129)
ALT SERPL-CCNC: 22 U/L (ref 10–40)
ANION GAP SERPL CALCULATED.3IONS-SCNC: 14 MMOL/L (ref 3–16)
APTT: 61.8 SEC (ref 23–34.3)
APTT: 73.2 SEC (ref 23–34.3)
AST SERPL-CCNC: 17 U/L (ref 15–37)
BACTERIA: ABNORMAL /HPF
BASOPHILS ABSOLUTE: 0 K/UL (ref 0–0.2)
BASOPHILS RELATIVE PERCENT: 0.3 %
BILIRUB SERPL-MCNC: 1.2 MG/DL (ref 0–1)
BILIRUBIN URINE: ABNORMAL
BLOOD, URINE: ABNORMAL
BUN BLDV-MCNC: 8 MG/DL (ref 7–20)
CALCIUM SERPL-MCNC: 9.7 MG/DL (ref 8.3–10.6)
CHLORIDE BLD-SCNC: 97 MMOL/L (ref 99–110)
CLARITY: CLEAR
CO2: 24 MMOL/L (ref 21–32)
COLOR: ABNORMAL
CREAT SERPL-MCNC: 0.7 MG/DL (ref 0.8–1.3)
EOSINOPHILS ABSOLUTE: 0 K/UL (ref 0–0.6)
EOSINOPHILS RELATIVE PERCENT: 0.3 %
EPITHELIAL CELLS, UA: 1 /HPF (ref 0–5)
GFR AFRICAN AMERICAN: >60
GFR NON-AFRICAN AMERICAN: >60
GLUCOSE BLD-MCNC: 115 MG/DL (ref 70–99)
GLUCOSE BLD-MCNC: 116 MG/DL (ref 70–99)
GLUCOSE BLD-MCNC: 240 MG/DL (ref 70–99)
GLUCOSE URINE: >=1000 MG/DL
HCT VFR BLD CALC: 43.4 % (ref 40.5–52.5)
HEMOGLOBIN: 14.3 G/DL (ref 13.5–17.5)
HYALINE CASTS: 2 /LPF (ref 0–8)
KETONES, URINE: >=160 MG/DL
LACTIC ACID, SEPSIS: 1.3 MMOL/L (ref 0.4–1.9)
LACTIC ACID, SEPSIS: 2 MMOL/L (ref 0.4–1.9)
LEUKOCYTE ESTERASE, URINE: ABNORMAL
LYMPHOCYTES ABSOLUTE: 0.9 K/UL (ref 1–5.1)
LYMPHOCYTES RELATIVE PERCENT: 7.7 %
MCH RBC QN AUTO: 30.4 PG (ref 26–34)
MCHC RBC AUTO-ENTMCNC: 33 G/DL (ref 31–36)
MCV RBC AUTO: 92.2 FL (ref 80–100)
MICROSCOPIC EXAMINATION: YES
MONOCYTES ABSOLUTE: 1.2 K/UL (ref 0–1.3)
MONOCYTES RELATIVE PERCENT: 10.8 %
NEUTROPHILS ABSOLUTE: 9.2 K/UL (ref 1.7–7.7)
NEUTROPHILS RELATIVE PERCENT: 80.9 %
NITRITE, URINE: NEGATIVE
PDW BLD-RTO: 13.7 % (ref 12.4–15.4)
PERFORMED ON: ABNORMAL
PERFORMED ON: ABNORMAL
PH UA: 5.5 (ref 5–8)
PLATELET # BLD: 214 K/UL (ref 135–450)
PMV BLD AUTO: 9.5 FL (ref 5–10.5)
POTASSIUM REFLEX MAGNESIUM: 3.9 MMOL/L (ref 3.5–5.1)
PROTEIN UA: 100 MG/DL
RBC # BLD: 4.71 M/UL (ref 4.2–5.9)
RBC UA: 358 /HPF (ref 0–4)
SODIUM BLD-SCNC: 135 MMOL/L (ref 136–145)
SPECIFIC GRAVITY UA: >=1.03 (ref 1–1.03)
TOTAL PROTEIN: 7.8 G/DL (ref 6.4–8.2)
URINE REFLEX TO CULTURE: YES
URINE TYPE: ABNORMAL
UROBILINOGEN, URINE: 1 E.U./DL
WBC # BLD: 11.4 K/UL (ref 4–11)
WBC UA: 16 /HPF (ref 0–5)

## 2022-05-27 PROCEDURE — 74018 RADEX ABDOMEN 1 VIEW: CPT

## 2022-05-27 PROCEDURE — 87086 URINE CULTURE/COLONY COUNT: CPT

## 2022-05-27 PROCEDURE — 6360000002 HC RX W HCPCS: Performed by: EMERGENCY MEDICINE

## 2022-05-27 PROCEDURE — 2700000000 HC OXYGEN THERAPY PER DAY

## 2022-05-27 PROCEDURE — 83036 HEMOGLOBIN GLYCOSYLATED A1C: CPT

## 2022-05-27 PROCEDURE — 96374 THER/PROPH/DIAG INJ IV PUSH: CPT

## 2022-05-27 PROCEDURE — 2580000003 HC RX 258: Performed by: EMERGENCY MEDICINE

## 2022-05-27 PROCEDURE — 80053 COMPREHEN METABOLIC PANEL: CPT

## 2022-05-27 PROCEDURE — 2580000003 HC RX 258: Performed by: INTERNAL MEDICINE

## 2022-05-27 PROCEDURE — 36415 COLL VENOUS BLD VENIPUNCTURE: CPT

## 2022-05-27 PROCEDURE — 74177 CT ABD & PELVIS W/CONTRAST: CPT

## 2022-05-27 PROCEDURE — 96375 TX/PRO/DX INJ NEW DRUG ADDON: CPT

## 2022-05-27 PROCEDURE — 87040 BLOOD CULTURE FOR BACTERIA: CPT

## 2022-05-27 PROCEDURE — 71260 CT THORAX DX C+: CPT

## 2022-05-27 PROCEDURE — 81001 URINALYSIS AUTO W/SCOPE: CPT

## 2022-05-27 PROCEDURE — 83605 ASSAY OF LACTIC ACID: CPT

## 2022-05-27 PROCEDURE — 96376 TX/PRO/DX INJ SAME DRUG ADON: CPT

## 2022-05-27 PROCEDURE — 2060000000 HC ICU INTERMEDIATE R&B

## 2022-05-27 PROCEDURE — 99285 EMERGENCY DEPT VISIT HI MDM: CPT

## 2022-05-27 PROCEDURE — 6360000004 HC RX CONTRAST MEDICATION: Performed by: EMERGENCY MEDICINE

## 2022-05-27 PROCEDURE — 85730 THROMBOPLASTIN TIME PARTIAL: CPT

## 2022-05-27 PROCEDURE — 99222 1ST HOSP IP/OBS MODERATE 55: CPT | Performed by: SURGERY

## 2022-05-27 PROCEDURE — 94760 N-INVAS EAR/PLS OXIMETRY 1: CPT

## 2022-05-27 PROCEDURE — 85025 COMPLETE CBC W/AUTO DIFF WBC: CPT

## 2022-05-27 RX ORDER — DEXTROSE MONOHYDRATE 50 MG/ML
100 INJECTION, SOLUTION INTRAVENOUS PRN
Status: DISCONTINUED | OUTPATIENT
Start: 2022-05-27 | End: 2022-05-31 | Stop reason: HOSPADM

## 2022-05-27 RX ORDER — SODIUM CHLORIDE 0.9 % (FLUSH) 0.9 %
5-40 SYRINGE (ML) INJECTION PRN
Status: DISCONTINUED | OUTPATIENT
Start: 2022-05-27 | End: 2022-05-31 | Stop reason: HOSPADM

## 2022-05-27 RX ORDER — POLYETHYLENE GLYCOL 3350 17 G/17G
17 POWDER, FOR SOLUTION ORAL DAILY PRN
Status: DISCONTINUED | OUTPATIENT
Start: 2022-05-27 | End: 2022-05-31 | Stop reason: HOSPADM

## 2022-05-27 RX ORDER — INSULIN LISPRO 100 [IU]/ML
0-6 INJECTION, SOLUTION INTRAVENOUS; SUBCUTANEOUS NIGHTLY
Status: DISCONTINUED | OUTPATIENT
Start: 2022-05-27 | End: 2022-05-31 | Stop reason: HOSPADM

## 2022-05-27 RX ORDER — ACETAMINOPHEN 650 MG/1
650 SUPPOSITORY RECTAL EVERY 6 HOURS PRN
Status: DISCONTINUED | OUTPATIENT
Start: 2022-05-27 | End: 2022-05-31 | Stop reason: HOSPADM

## 2022-05-27 RX ORDER — HEPARIN SODIUM 10000 [USP'U]/100ML
5-30 INJECTION, SOLUTION INTRAVENOUS CONTINUOUS
Status: DISCONTINUED | OUTPATIENT
Start: 2022-05-27 | End: 2022-05-31

## 2022-05-27 RX ORDER — HEPARIN SODIUM 1000 [USP'U]/ML
40 INJECTION, SOLUTION INTRAVENOUS; SUBCUTANEOUS PRN
Status: DISCONTINUED | OUTPATIENT
Start: 2022-05-27 | End: 2022-05-31 | Stop reason: ALTCHOICE

## 2022-05-27 RX ORDER — SODIUM CHLORIDE 0.9 % (FLUSH) 0.9 %
5-40 SYRINGE (ML) INJECTION EVERY 12 HOURS SCHEDULED
Status: DISCONTINUED | OUTPATIENT
Start: 2022-05-27 | End: 2022-05-31 | Stop reason: HOSPADM

## 2022-05-27 RX ORDER — ONDANSETRON 4 MG/1
4 TABLET, ORALLY DISINTEGRATING ORAL EVERY 8 HOURS PRN
Status: DISCONTINUED | OUTPATIENT
Start: 2022-05-27 | End: 2022-05-31 | Stop reason: HOSPADM

## 2022-05-27 RX ORDER — HEPARIN SODIUM 1000 [USP'U]/ML
80 INJECTION, SOLUTION INTRAVENOUS; SUBCUTANEOUS ONCE
Status: COMPLETED | OUTPATIENT
Start: 2022-05-27 | End: 2022-05-27

## 2022-05-27 RX ORDER — HYDROMORPHONE HYDROCHLORIDE 1 MG/ML
0.5 INJECTION, SOLUTION INTRAMUSCULAR; INTRAVENOUS; SUBCUTANEOUS
Status: DISCONTINUED | OUTPATIENT
Start: 2022-05-27 | End: 2022-05-31 | Stop reason: HOSPADM

## 2022-05-27 RX ORDER — ONDANSETRON 2 MG/ML
4 INJECTION INTRAMUSCULAR; INTRAVENOUS EVERY 6 HOURS PRN
Status: DISCONTINUED | OUTPATIENT
Start: 2022-05-27 | End: 2022-05-31 | Stop reason: HOSPADM

## 2022-05-27 RX ORDER — ACETAMINOPHEN 325 MG/1
650 TABLET ORAL EVERY 6 HOURS PRN
Status: DISCONTINUED | OUTPATIENT
Start: 2022-05-27 | End: 2022-05-31 | Stop reason: HOSPADM

## 2022-05-27 RX ORDER — LISINOPRIL 10 MG/1
10 TABLET ORAL DAILY
Status: DISCONTINUED | OUTPATIENT
Start: 2022-05-27 | End: 2022-05-31 | Stop reason: HOSPADM

## 2022-05-27 RX ORDER — HEPARIN SODIUM 1000 [USP'U]/ML
80 INJECTION, SOLUTION INTRAVENOUS; SUBCUTANEOUS PRN
Status: DISCONTINUED | OUTPATIENT
Start: 2022-05-27 | End: 2022-05-31 | Stop reason: ALTCHOICE

## 2022-05-27 RX ORDER — INSULIN LISPRO 100 [IU]/ML
0-12 INJECTION, SOLUTION INTRAVENOUS; SUBCUTANEOUS
Status: DISCONTINUED | OUTPATIENT
Start: 2022-05-27 | End: 2022-05-31 | Stop reason: HOSPADM

## 2022-05-27 RX ORDER — ONDANSETRON 2 MG/ML
8 INJECTION INTRAMUSCULAR; INTRAVENOUS ONCE
Status: COMPLETED | OUTPATIENT
Start: 2022-05-27 | End: 2022-05-27

## 2022-05-27 RX ORDER — SODIUM CHLORIDE, SODIUM LACTATE, POTASSIUM CHLORIDE, CALCIUM CHLORIDE 600; 310; 30; 20 MG/100ML; MG/100ML; MG/100ML; MG/100ML
INJECTION, SOLUTION INTRAVENOUS CONTINUOUS
Status: DISCONTINUED | OUTPATIENT
Start: 2022-05-27 | End: 2022-05-28

## 2022-05-27 RX ORDER — SODIUM CHLORIDE, SODIUM LACTATE, POTASSIUM CHLORIDE, CALCIUM CHLORIDE 600; 310; 30; 20 MG/100ML; MG/100ML; MG/100ML; MG/100ML
30 INJECTION, SOLUTION INTRAVENOUS ONCE
Status: COMPLETED | OUTPATIENT
Start: 2022-05-27 | End: 2022-05-27

## 2022-05-27 RX ORDER — SODIUM CHLORIDE 9 MG/ML
INJECTION, SOLUTION INTRAVENOUS PRN
Status: DISCONTINUED | OUTPATIENT
Start: 2022-05-27 | End: 2022-05-31 | Stop reason: HOSPADM

## 2022-05-27 RX ADMIN — IOPAMIDOL 75 ML: 755 INJECTION, SOLUTION INTRAVENOUS at 07:45

## 2022-05-27 RX ADMIN — HEPARIN SODIUM 8170 UNITS: 1000 INJECTION INTRAVENOUS; SUBCUTANEOUS at 08:49

## 2022-05-27 RX ADMIN — SODIUM CHLORIDE, POTASSIUM CHLORIDE, SODIUM LACTATE AND CALCIUM CHLORIDE: 600; 310; 30; 20 INJECTION, SOLUTION INTRAVENOUS at 14:21

## 2022-05-27 RX ADMIN — HEPARIN SODIUM 4080 UNITS: 1000 INJECTION INTRAVENOUS; SUBCUTANEOUS at 22:09

## 2022-05-27 RX ADMIN — Medication 10 ML: at 20:52

## 2022-05-27 RX ADMIN — HYDROMORPHONE HYDROCHLORIDE 0.5 MG: 1 INJECTION, SOLUTION INTRAMUSCULAR; INTRAVENOUS; SUBCUTANEOUS at 07:10

## 2022-05-27 RX ADMIN — Medication 18 UNITS/KG/HR: at 09:25

## 2022-05-27 RX ADMIN — ONDANSETRON 8 MG: 2 INJECTION INTRAMUSCULAR; INTRAVENOUS at 07:06

## 2022-05-27 RX ADMIN — SODIUM CHLORIDE, POTASSIUM CHLORIDE, SODIUM LACTATE AND CALCIUM CHLORIDE 2328 ML: 600; 310; 30; 20 INJECTION, SOLUTION INTRAVENOUS at 07:05

## 2022-05-27 ASSESSMENT — PAIN - FUNCTIONAL ASSESSMENT: PAIN_FUNCTIONAL_ASSESSMENT: 0-10

## 2022-05-27 ASSESSMENT — PAIN DESCRIPTION - DESCRIPTORS
DESCRIPTORS: SHARP;PRESSURE
DESCRIPTORS: SHARP;SHOOTING
DESCRIPTORS: PRESSURE;SHARP;SHOOTING

## 2022-05-27 ASSESSMENT — PAIN SCALES - GENERAL
PAINLEVEL_OUTOF10: 9
PAINLEVEL_OUTOF10: 5
PAINLEVEL_OUTOF10: 4
PAINLEVEL_OUTOF10: 4
PAINLEVEL_OUTOF10: 0

## 2022-05-27 ASSESSMENT — PAIN DESCRIPTION - ONSET
ONSET: ON-GOING
ONSET: ON-GOING

## 2022-05-27 ASSESSMENT — PAIN DESCRIPTION - LOCATION
LOCATION: ABDOMEN
LOCATION: ABDOMEN;SHOULDER

## 2022-05-27 ASSESSMENT — PAIN DESCRIPTION - ORIENTATION
ORIENTATION: LOWER;UPPER
ORIENTATION: MID;LOWER

## 2022-05-27 ASSESSMENT — LIFESTYLE VARIABLES: HOW OFTEN DO YOU HAVE A DRINK CONTAINING ALCOHOL: NEVER

## 2022-05-27 ASSESSMENT — PAIN DESCRIPTION - FREQUENCY: FREQUENCY: INTERMITTENT

## 2022-05-27 ASSESSMENT — PAIN DESCRIPTION - PAIN TYPE: TYPE: ACUTE PAIN

## 2022-05-27 NOTE — CONSULTS
[address] and Laparoscopic Surgery     Consult                                                     Patient Name: Heriberto Mohan  MRN: [id number]  YOB: 1950  Admission date: 5/27/2022  6:26 AM   Date of evaluation: 5/27/2022  Primary Care Physician: Carlos Lerma MD  Requesting physician: Diana Granados MD  Reason for consult: Abdominal pain  History of Present Illness:    Mr. Jacinto Obregon is a 80 y.o. male who underwent robotic prostatectomy for malignancy 2 days ago. Procedure was uneventful and patient was discharged but developed abdominal pain beginning last evening that progressed. Severe cramping lower abdominal pain that did not radiate, nothing made it better or worse. Associated with large volume emesis and low-grade fever prompting emergency department evaluation. CT showed ileus and NG placed with some relief. Last flatus earlier this evening. Patient also found to be hypoxic and CTPA confirmed pulmonary embolus for which she is not on a heparin drip, is now off oxygen. Denies chest pain dyspnea otherwise jaundice dysuria change in appetite weight or other complaints. Other medical conditions include diabetes hypertension hyperlipidemia.      Past Medical History:        Diagnosis Date    Hyperlipidemia     Hypertension     Type II or unspecified type diabetes mellitus without mention of complication, not stated as uncontrolled        Past Surgical History:        Procedure Laterality Date    FOOT SURGERY      repair of fallen arch and tendon repair    KNEE ARTHROSCOPY      Both knees R 2007, L 2017    PROSTATECTOMY N/A 5/25/2022    ROBOTIC LAPAROSCOPIC RADICAL PROSTATECTOMY WITH BILATERAL LYMPH NODE DISSECTION AND BILATERAL NERVE SPARE performed by Brody Alex MD at [place] [address] Right 10/30/2013       Scheduled Meds:   lisinopril  10 mg Oral Daily    sodium chloride flush  5-40 mL IntraVENous 2 times per day    insulin lispro  0-12 Units SubCUTAneous TID     insulin lispro  0-6 Units SubCUTAneous Nightly     Continuous Infusions:   heparin (PORCINE) Infusion 18 Units/kg/hr (05/27/22 0925)    lactated ringers 75 mL/hr at 05/27/22 1421    sodium chloride      dextrose       PRN Meds:. HYDROmorphone, heparin (porcine), heparin (porcine), sodium chloride flush, sodium chloride, ondansetron **OR** ondansetron, polyethylene glycol, acetaminophen **OR** acetaminophen, phenol, dextrose bolus **OR** dextrose bolus, glucagon (rDNA), dextrose    Prior to Admission medications    Medication Sig Start Date End Date Taking? Authorizing Provider   HYDROcodone-acetaminophen (NORCO) 5-325 MG per tablet Take 1 tablet by mouth every 6 hours as needed for Pain for up to 5 days. Intended supply: 5 days. Take lowest dose possible to manage pain. 5/25/22 5/30/22  Vijay Gonzalez MD   senna-docusate (PERICOLACE) 8.6-50 MG per tablet Take 1 tablet by mouth 2 times daily For constipation while on pain medication.  5/25/22 6/24/22  Vijay Gonzalez MD   metFORMIN (GLUCOPHAGE-XR) 500 MG extended release tablet TAKE 2 TABLETS BY MOUTH DAILY (WITH BREAKFAST) 11/26/21 5/20/22  JUVENCIO Thornton CNP   lisinopril (PRINIVIL;ZESTRIL) 10 MG tablet TAKE 1 TABLET BY MOUTH EVERY DAY 9/17/21   Angel Gallego MD   simvastatin (ZOCOR) 40 MG tablet TAKE 1 TABLET BY MOUTH EVERY DAY EVERY NIGHT 9/17/21   Everardo Haynes MD   Multiple Vitamins-Minerals (THERAPEUTIC MULTIVITAMIN-MINERALS) tablet Take 1 tablet by mouth daily    Historical Provider, MD   Cholecalciferol (VITAMIN D3) 1000 units TABS Take 1 tablet by mouth daily 4/23/18   Subha Alfaro MD        Allergies:  No known allergies    Social History:   Social History     Socioeconomic History    Marital status:      Spouse name: None    Number of children: None    Years of education: None    Highest education level: None   Occupational History    None   Tobacco Use    Smoking status: Never Smoker    Smokeless tobacco: Never Used   Vaping Use    Vaping Use: Never used   Substance and Sexual Activity    Alcohol use: No    Drug use: No    Sexual activity: None     Comment:    Other Topics Concern    None   Social History Narrative    None     Social Determinants of Health     Financial Resource Strain:     Difficulty of Paying Living Expenses: Not on file   Food Insecurity:     Worried About Running Out of Food in the Last Year: Not on file    Alexei of Food in the Last Year: Not on file   Transportation Needs:     Lack of Transportation (Medical): Not on file    Lack of Transportation (Non-Medical):  Not on file   Physical Activity:     Days of Exercise per Week: Not on file    Minutes of Exercise per Session: Not on file   Stress:     Feeling of Stress : Not on file   Social Connections:     Frequency of Communication with Friends and Family: Not on file    Frequency of Social Gatherings with Friends and Family: Not on file    Attends [employer] Services: Not on file    Active Member of [address] [place] [address] or Organizations: Not on file    Attends Club or Organization Meetings: Not on file    Marital Status: Not on file   Intimate Partner Violence:     Fear of Current or Ex-Partner: Not on file    Emotionally Abused: Not on file    Physically Abused: Not on file    Sexually Abused: Not on file   Housing Stability:     Unable to Pay for Housing in the Last Year: Not on file    Number of [place] in the Last Year: Not on file    Unstable Housing in the Last Year: Not on file       Family History:    Family History   Problem Relation Age of Onset    Breast Cancer Mother     Heart Attack Mother     Diabetes Maternal Aunt        Review of Systems:  CONSTITUTIONAL:  Negative except as above  HEENT:  negative  RESPIRATORY:  negative  CARDIOVASCULAR:  negative  GASTROINTESTINAL:  negative except as above   GENITOURINARY:  negative  HEMATOLOGIC/LYMPHATIC:  negative  NEUROLOGICAL: Negative      Vital Signs:  Patient Vitals for the past 24 hrs:   BP Temp Temp src Pulse Resp SpO2 Height Weight   22 1505 -- -- -- 89 22 95 % -- --   22 1200 129/69 98.3 °F (36.8 °C) -- 89 18 92 % -- --   22 1130 -- -- -- -- -- 95 % -- --   22 1100 132/65 -- -- 92 16 96 % -- --   22 1045 131/67 -- -- 84 17 97 % -- --   22 1003 135/66 -- -- 87 16 98 % -- --   22 0945 139/74 -- -- 87 16 -- -- --   22 0926 (!) 145/64 -- -- 89 18 97 % -- --   22 0900 (!) 143/72 -- -- 85 16 99 % -- --   22 0803 138/79 -- -- 82 20 99 % -- --   22 0700 (!) 149/93 -- -- 94 20 95 % -- --   22 0645 (!) 146/75 -- -- (!) 101 18 92 % -- --   22 0625 (!) 142/87 98.3 °F (36.8 °C) Oral (!) 107 24 93 % 6' (1.829 m) 225 lb (102.1 kg)      TEMPERATURE HISTORY 24H: Temp (24hrs), Av.3 °F (36.8 °C), Min:98.3 °F (36.8 °C), Max:98.3 °F (36.8 °C)    BLOOD PRESSURE HISTORY: Systolic (88FEC), VLM:976 , Min:129 , UOS:488    Diastolic (22CVF), NMO:11, Min:64, Max:93    Admission Weight: 225 lb (102.1 kg)       Intake/Output Summary (Last 24 hours) at 2022 1809  Last data filed at 2022 1227  Gross per 24 hour   Intake --   Output 1200 ml   Net -1200 ml     Drain/tube Output:         Physical Exam:  Constitutional:  well-developed, well-nourished,   Neurologic: awake, Orientation:  Oriented to person, place, time, follows commands, clear speech, cranial nerves grossly intact  Psychiatric: normal affect, no hallucinations  Eyes:  sclera clear, no visible discharge  Head, ears, nose, mouth, throat: normocepalic, without obvious abnormality, supple, symmetrical, trachea midline, no JVD, mucous membranes moist  Cardiovascular: regular rate and rhythm   Respiratory: clear to auscultation  GI: soft, moderate distension, mild mid abdominal tenderness without peritonitis, incisions healing well  Lymph: no palpable lymphadenopathy  Skin: no bruising or bleeding, normal skin color, texture, turgor and no redness, warmth, or swelling    Labs:    CBC:    Recent Labs     05/27/22  0655   WBC 11.4*   HGB 14.3   HCT 43.4        BMP:    Recent Labs     05/27/22  0655   *   K 3.9   CL 97*   CO2 24   BUN 8   CREATININE 0.7*   GLUCOSE 240*     Hepatic:   Recent Labs     05/27/22  0655   AST 17   ALT 22   BILITOT 1.2*   ALKPHOS 52     Amylase: No results for input(s): AMYLASE in the last 72 hours. Lipase: No results for input(s): LIPASE in the last 72 hours. Mag:    No results for input(s): MG in the last 72 hours. Phos:   No results for input(s): PHOS in the last 72 hours. Coags:   Recent Labs     05/27/22  1433   APTT 73.2*       Imaging:  I have personally reviewed the following films:  XR CHEST (2 VW)    Result Date: 5/11/2022  EXAMINATION: TWO XRAY VIEWS OF THE CHEST 5/11/2022 8:50 am COMPARISON: Chest x-ray dated 12/11/2017. HISTORY: ORDERING SYSTEM PROVIDED HISTORY: Preop testing TECHNOLOGIST PROVIDED HISTORY: Reason for Exam: Preop testing FINDINGS: HEART/MEDIASTINUM: The cardiomediastinal silhouette is within normal limits. PLEURA/LUNGS: There are no focal consolidations or pleural effusions. There is no appreciable pneumothorax. BONES/SOFT TISSUE: No acute abnormality. No radiographic evidence of acute pulmonary disease. CT ABDOMEN PELVIS W IV CONTRAST Additional Contrast? None    Result Date: 5/27/2022  EXAMINATION: CT OF THE ABDOMEN AND PELVIS WITH CONTRAST 5/27/2022 7:45 am TECHNIQUE: CT of the abdomen and pelvis was performed with the administration of intravenous contrast. Multiplanar reformatted images are provided for review. Automated exposure control, iterative reconstruction, and/or weight based adjustment of the mA/kV was utilized to reduce the radiation dose to as low as reasonably achievable.  COMPARISON: 04/26/2022 HISTORY: ORDERING SYSTEM PROVIDED HISTORY: abdominal pain, vomiting, fever, 2 days post op prostatectomy TECHNOLOGIST PROVIDED HISTORY: hypercholesterolemia    HTN (hypertension)    Type 2 diabetes mellitus without complication, without long-term current use of insulin (HCC)    Prostate cancer ([facility] [address]    Acute pulmonary embolism (HCC)     Postoperative ileus  Acute pulmonary embolism  Diabetes    Plan:  1. The patient has an ileus or small bowel obstruction that is post-operative. Most will respond to maximal conservative measures. If he does not respond to conservative management or clinically deteriorates, may need surgical exploration  2. If not significantly better by 2-3 days, will repeat CT with PO contrast or order small bowel follow through which may be both diagnostic and therapeutic  3. NG decompression, bowel rest  4. Monitor bowel function, passed small flatus but no recent stool  5. IVF resuscitation, monitor and replace electrolytes as needed  6. Monitor leukocytosis, does not need antibiotics  7. Pain medication and antiemetics as needed with caution as they may mask a worsening exam  8. May anticoagulate with heparin gtt from surgical standpoint, PO anticoagulation at discharge  9. Defer management of remainder of medical comorbidities to primary and consulting teams    This plan was discussed at length with the patient. He was understanding and in agreement with the plan  Thank you for the consult and allowing me to participate in the care of this patient. I look forward to following him this admission    Joselito Traore MD, FACS  5/27/2022  6:09 PM

## 2022-05-27 NOTE — PROGRESS NOTES
Pharmacy to check patient copays for Eliquis/Xarelto:    Pharmacy is not contracted with patient's insurance so unable to verify [phone] copay. Pharmacy will continue to follow the decision for anticoagulation and  the patient if appropriate.        Teo Theodore, PharmD, [facility]  Clinical Pharmacist  [id number]

## 2022-05-27 NOTE — ED PROVIDER NOTES
[address] [facility] PROVIDER NOTE    Patient Identification  Pt Name: Mehdi Hurt  MRN: [id number]  Kayli 1950  Date of evaluation: 5/27/2022  Provider: Catina Evans MD  PCP: David De Paz MD    Chief Complaint  Post-op Problem (Pt had a prostatectomy on May 25th and you started to run fever yesterday evening (temp 100.8). Pt has not had a bowel movement since before surgery and is not passing gas. Has taken enema at home prior to surgery. Pain medication last given at 2200 last night. ), Nausea, Emesis, and Abdominal Pain (Upper and lower abdominal pain that radiates between shoulders.)      HPI  (History provided by patient and spouse)  This is a 80 y.o. male who was brought in by self for vomiting and abdominal pain. Patient is also had fever of 100.8 yesterday. Patient's history significant for prostatectomy performed 2 days ago on May 25. He was initially doing well, but progressively worsened over the last 24 hours. He developed increasing pain in his mid and lower abdomen with increasing abdominal distention. It evolved into profuse nausea and vomiting. He is not unable to tolerate any food or fluids. Pain waxes and wanes in severity. It is currently rated 8/10. It is worse when he is vomiting. Pain is pressure-like with sharp exacerbations. He has an indwelling Costa and continues to produce urine. He has not passed a bowel movement since the surgery. His wife states he is also not passing gas. He feels mildly short of breath, but thinks it is due to the distention of his abdomen. Specifically, he states he has difficulty taking a deep breath. He is not having chest pain    ROS  10 systems reviewed, pertinent positives/negatives per HPI otherwise noted to be negative.     I have reviewed the following nursing documentation:  Allergies: No known allergies    Past medical history:   Past Medical History:   Diagnosis Date    Hyperlipidemia     Hypertension     Type II or unspecified type diabetes mellitus without mention of complication, not stated as uncontrolled      Past surgical history:   Past Surgical History:   Procedure Laterality Date    FOOT SURGERY      repair of fallen arch and tendon repair    KNEE ARTHROSCOPY      Both knees R 2007, L 2017    PROSTATECTOMY N/A 5/25/2022    ROBOTIC LAPAROSCOPIC RADICAL PROSTATECTOMY WITH BILATERAL LYMPH NODE DISSECTION AND BILATERAL NERVE SPARE performed by Yamilka Perez MD at [address] 10/30/2013       Home medications:   Previous Medications    CHOLECALCIFEROL (VITAMIN D3) 1000 UNITS TABS    Take 1 tablet by mouth daily    HYDROCODONE-ACETAMINOPHEN (NORCO) 5-325 MG PER TABLET    Take 1 tablet by mouth every 6 hours as needed for Pain for up to 5 days. Intended supply: 5 days. Take lowest dose possible to manage pain. LISINOPRIL (PRINIVIL;ZESTRIL) 10 MG TABLET    TAKE 1 TABLET BY MOUTH EVERY DAY    METFORMIN (GLUCOPHAGE-XR) 500 MG EXTENDED RELEASE TABLET    TAKE 2 TABLETS BY MOUTH DAILY (WITH BREAKFAST)    MULTIPLE VITAMINS-MINERALS (THERAPEUTIC MULTIVITAMIN-MINERALS) TABLET    Take 1 tablet by mouth daily    SENNA-DOCUSATE (PERICOLACE) 8.6-50 MG PER TABLET    Take 1 tablet by mouth 2 times daily For constipation while on pain medication. SIMVASTATIN (ZOCOR) 40 MG TABLET    TAKE 1 TABLET BY MOUTH EVERY DAY EVERY NIGHT       Social history:  reports that he has never smoked. He has never used smokeless tobacco. He reports that he does not drink alcohol and does not use drugs. Family history:    Family History   Problem Relation Age of Onset    Breast Cancer Mother     Heart Attack Mother     Diabetes Maternal Aunt        Exam  ED Triage Vitals [05/27/22 0625]   BP Temp Temp Source Heart Rate Resp SpO2 Height Weight   (!) 142/87 98.3 °F (36.8 °C) Oral (!) 107 24 93 % 6' (1.829 m) 225 lb (102.1 kg)     Nursing note and vitals reviewed.   Constitutional: Ill-appearing, but nontoxic. HENT:      Head: Normocephalic and atraumatic. Ears: External ears normal.      Nose: Nose normal.     Mouth: Membrane mucosa dry. .  Eyes: Anicteric sclera. No discharge. Neck: Supple. Trachea midline. Cardiovascular: Tachycardic with regular rhythm; no murmurs, rubs, or gallops. Pulmonary/Chest: Increased rate of breathing and mildly increased work of breathing. Borderline hypoxic on monitor with O2 use at 90 to 91% on the low end. Abdominal: Firm moderately firm. Significantly distended with diffuse severe tenderness, guarding, and rebound. Bowel sounds present  Musculoskeletal: Moves all extremities. No gross deformity. No lower extremity edema  Neurological: Alert and oriented. Face symmetric. Speech is clear. Skin: Warm and dry. No rash. Psychiatric: Normal mood and affect. Behavior is normal.    Radiology  XR ABDOMEN FOR NG/OG/NE TUBE PLACEMENT   Final Result   Tip of NG tube projects in region of gastric fundus      Scattered gaseous distention throughout the abdomen, incompletely evaluated         CT ABDOMEN PELVIS W IV CONTRAST Additional Contrast? None   Final Result   Mild to moderate postoperative ileus. RECOMMENDATIONS:   Unavailable         CT CHEST PULMONARY EMBOLISM W CONTRAST   Final Result   Small right lower lobe subsegmental pulmonary emboli. Findings were discussed with Young Doan at 8:13 am on 5/27/2022.       RECOMMENDATIONS:   Unavailable             Labs  Results for orders placed or performed during the hospital encounter of 05/27/22   CBC with Auto Differential   Result Value Ref Range    WBC 11.4 (H) 4.0 - 11.0 K/uL    RBC 4.71 4.20 - 5.90 M/uL    Hemoglobin 14.3 13.5 - 17.5 g/dL    Hematocrit 43.4 40.5 - 52.5 %    MCV 92.2 80.0 - 100.0 fL    MCH 30.4 26.0 - 34.0 pg    MCHC 33.0 31.0 - 36.0 g/dL    RDW 13.7 12.4 - 15.4 %    Platelets 602 124 - 099 K/uL    MPV 9.5 5.0 - 10.5 fL    Neutrophils % 80.9 %    Lymphocytes % 7.7 %    Monocytes % 10.8 %    Eosinophils % 0.3 %    Basophils % 0.3 %    Neutrophils Absolute 9.2 (H) 1.7 - 7.7 K/uL    Lymphocytes Absolute 0.9 (L) 1.0 - 5.1 K/uL    Monocytes Absolute 1.2 0.0 - 1.3 K/uL    Eosinophils Absolute 0.0 0.0 - 0.6 K/uL    Basophils Absolute 0.0 0.0 - 0.2 K/uL   Comprehensive Metabolic Panel w/ Reflex to MG   Result Value Ref Range    Sodium 135 (L) 136 - 145 mmol/L    Potassium reflex Magnesium 3.9 3.5 - 5.1 mmol/L    Chloride 97 (L) 99 - 110 mmol/L    CO2 24 21 - 32 mmol/L    Anion Gap 14 3 - 16    Glucose 240 (H) 70 - 99 mg/dL    BUN 8 7 - 20 mg/dL    CREATININE 0.7 (L) 0.8 - 1.3 mg/dL    GFR Non-African American >60 >60    GFR African American >60 >60    Calcium 9.7 8.3 - 10.6 mg/dL    Total Protein 7.8 6.4 - 8.2 g/dL    Albumin 4.1 3.4 - 5.0 g/dL    Albumin/Globulin Ratio 1.1 1.1 - 2.2    Total Bilirubin 1.2 (H) 0.0 - 1.0 mg/dL    Alkaline Phosphatase 52 40 - 129 U/L    ALT 22 10 - 40 U/L    AST 17 15 - 37 U/L   Urinalysis with Reflex to Culture    Specimen: Urine, clean catch   Result Value Ref Range    Color, UA DARK YELLOW (A) Straw/Yellow    Clarity, UA Clear Clear    Glucose, Ur >=1000 (A) Negative mg/dL    Bilirubin Urine SMALL (A) Negative    Ketones, Urine >=160 (A) Negative mg/dL    Specific Gravity, UA >=1.030 1.005 - 1.030    Blood, Urine LARGE (A) Negative    pH, UA 5.5 5.0 - 8.0    Protein,  (A) Negative mg/dL    Urobilinogen, Urine 1.0 <2.0 E.U./dL    Nitrite, Urine Negative Negative    Leukocyte Esterase, Urine SMALL (A) Negative    Microscopic Examination YES     Urine Type Voided     Urine Reflex to Culture Yes    Lactate, Sepsis   Result Value Ref Range    Lactic Acid, Sepsis 2.0 (H) 0.4 - 1.9 mmol/L   Microscopic Urinalysis   Result Value Ref Range    Bacteria, UA None Seen None Seen /HPF    Hyaline Casts, UA 2 0 - 8 /LPF    WBC, UA 16 (H) 0 - 5 /HPF    RBC,  (H) 0 - 4 /HPF    Epithelial Cells, UA 1 0 - 5 /HPF       Screenings  Is this patient to be included in the SEP-1 Core Measure due to severe sepsis or septic shock? No   Exclusion criteria - the patient is NOT to be included for SEP-1 Core Measure due to: Alternative explanation for abnormal labs/vitals that do not relate to sepsis, see MDM for further explanation     MDM and ED Course  Patient patient's fever and infection, I was concerned for postoperative infection. However, this was not found in his work-up. He had no evidence of urinary tract infection. There is also no evidence of pneumonia on his imaging. Otherwise plan to initiate evaluation of his lungs with a chest x-ray, I decided to instead order a CT for PE as I was already going to perform a CT of his abdomen. PE was high in my differential given he has postoperative and had borderline hypoxia. We treated his hypoxia with supplemental oxygen, after which is stabilized. CT chest showed evidence of bilateral pulm emboli, but no pneumonia. CT abdomen showed postoperative ileus, consistent with his presentation. The fever is likely secondary to his pulmonary emboli and not infection, so antibiotics not currently indicated. His lactic acid is 2.0, which is attributable to dehydration from his profuse vomiting and ileus. He only has a slight leukocytosis of 11.4. Blood cultures were sent, however. Patient presented with nausea, vomiting, abdominal pain, and abdominal distension 2 days s/p prostatectomy performed by Dr. Lico Duffy. CT shows evidence of ileus. NG in place and patient is NPO. In addition, he was mildly hypoxic on arrival (91%), so I added a CT of the chest as well. He has bilateral lower lobe pulmonary emboli and I initiated a heparin drip. I also consulted urology, who will see and evaluate the patient. He was febrile at 100.8 and meets SIRS criteria. However, no source of infection was seen on work up. I suspect the fever is due to post-operative pulmonary emboli, so I did not treat with IV antibiotics.     I consulted Dr. Maura Braga through [facility] for admission. He reviewed the patient's history, physical exam, labs, imaging studies, and emergency department course and has decided to admit [facility] for further evaluation and treatment. As I have deemed necessary from their history, physical, and studies, I have considered and evaluated [facility] for the following diagnoses:  ACUTE APPENDICITIS, BOWEL OBSTRUCTION, CHOLECYSTITIS, DIVERTICULITIS, INCARCERATED HERNIA, PANCREATITIS, MESENTERIC ISCHEMIA, ABDOMINAL AORTIC ANEURYSM/DISSECTION, PERFORATED BOWEL, PERFORATED ULCER    The total Critical Care time is 45 minutes which excludes separately billable procedures. Final Impression  1. Postoperative ileus ([address]    2. Bilateral pulmonary embolism (HCC)        Blood pressure (!) 145/64, pulse 89, temperature 98.3 °F (36.8 °C), temperature source Oral, resp. rate 18, height 6' (1.829 m), weight 225 lb (102.1 kg), SpO2 97 %. Disposition:  DISPOSITION Decision To Admit 05/27/2022 09:32:40 AM    This chart was generated using the [address] [place] [address] dictation system. I created this record but it may contain dictation errors given the limitations of this technology.        Bronwyn Young MD  06/03/22 [id number]

## 2022-05-27 NOTE — ACP (ADVANCE CARE PLANNING)
Advanced Care Planning Note. Purpose of Encounter: Advanced care planning in light of hospitalization  Parties In Attendance: Patient,    Decisional Capacity: Yes  Subjective: Patient  understand that this conversation is to address long term care goal  Objective: Patient admitted to hospital acute pulmonary embolism history of prostate cancer status post prostatectomy  Goals of Care Determination: Patient would pursue CPR and Intubation if required.  Unsure about long term ventilation/tracheostomy, would want family to make the decision at the time if required  Code Status: full code  Time spent on Advanced care Plannin minutes  Advanced Care Planning Documents: documented patient's wishes, would like Wife Brandon Fields to make medical decisions if unable to make decisions    Ramiro Arnold MD  2022 2:22 PM

## 2022-05-27 NOTE — ED NOTES
Bedside report give to [employer], pt. Transported to floor with IV Heparin infusing.       Javon Kessler RN  05/27/22 6496

## 2022-05-27 NOTE — PROGRESS NOTES
Pharmacy Home Medication Reconciliation Note    A medication reconciliation has been completed for [place] Began 1950    Pharmacy: [address] provided by: patient    The patient's home medication list is as follows:  Prior to Admission medications    Medication Sig Start Date End Date Taking? Authorizing Provider   HYDROcodone-acetaminophen (NORCO) 5-325 MG per tablet Take 1 tablet by mouth every 6 hours as needed for Pain for up to 5 days. Intended supply: 5 days. Take lowest dose possible to manage pain. 5/25/22 5/30/22  Dixie Kan MD   senna-docusate (PERICOLACE) 8.6-50 MG per tablet Take 1 tablet by mouth 2 times daily For constipation while on pain medication. 5/25/22 6/24/22  Dixie Kan MD   metFORMIN (GLUCOPHAGE-XR) 500 MG extended release tablet TAKE 2 TABLETS BY MOUTH DAILY (WITH BREAKFAST) 11/26/21 5/20/22  JUVENCIO Boo - CNP   lisinopril (PRINIVIL;ZESTRIL) 10 MG tablet TAKE 1 TABLET BY MOUTH EVERY DAY 9/17/21   Chandrakant Short MD   simvastatin (ZOCOR) 40 MG tablet TAKE 1 TABLET BY MOUTH EVERY DAY EVERY NIGHT 9/17/21   Chandrakant Short MD   Multiple Vitamins-Minerals (THERAPEUTIC MULTIVITAMIN-MINERALS) tablet Take 1 tablet by mouth daily    Historical Provider, MD   Cholecalciferol (VITAMIN D3) 1000 units TABS Take 1 tablet by mouth daily 4/23/18   Genia Jade MD        Timing of last doses updated.     Thank you,  Misa Escamilla, PharmD, BCCCP

## 2022-05-28 ENCOUNTER — APPOINTMENT (OUTPATIENT)
Dept: GENERAL RADIOLOGY | Age: 72
DRG: 356 | End: 2022-05-28
Payer: MEDICARE

## 2022-05-28 LAB
A/G RATIO: 1.1 (ref 1.1–2.2)
ALBUMIN SERPL-MCNC: 3.3 G/DL (ref 3.4–5)
ALP BLD-CCNC: 39 U/L (ref 40–129)
ALT SERPL-CCNC: 16 U/L (ref 10–40)
ANION GAP SERPL CALCULATED.3IONS-SCNC: 10 MMOL/L (ref 3–16)
APTT: 80.9 SEC (ref 23–34.3)
APTT: 81.4 SEC (ref 23–34.3)
AST SERPL-CCNC: 16 U/L (ref 15–37)
BASOPHILS ABSOLUTE: 0 K/UL (ref 0–0.2)
BASOPHILS RELATIVE PERCENT: 0.4 %
BILIRUB SERPL-MCNC: 0.8 MG/DL (ref 0–1)
BUN BLDV-MCNC: 8 MG/DL (ref 7–20)
CALCIUM SERPL-MCNC: 8.8 MG/DL (ref 8.3–10.6)
CHLORIDE BLD-SCNC: 99 MMOL/L (ref 99–110)
CO2: 27 MMOL/L (ref 21–32)
CREAT SERPL-MCNC: 0.7 MG/DL (ref 0.8–1.3)
EOSINOPHILS ABSOLUTE: 0.4 K/UL (ref 0–0.6)
EOSINOPHILS RELATIVE PERCENT: 4.3 %
ESTIMATED AVERAGE GLUCOSE: 157.1 MG/DL
GFR AFRICAN AMERICAN: >60
GFR NON-AFRICAN AMERICAN: >60
GLUCOSE BLD-MCNC: 119 MG/DL (ref 70–99)
GLUCOSE BLD-MCNC: 135 MG/DL (ref 70–99)
GLUCOSE BLD-MCNC: 136 MG/DL (ref 70–99)
GLUCOSE BLD-MCNC: 138 MG/DL (ref 70–99)
GLUCOSE BLD-MCNC: 171 MG/DL (ref 70–99)
HBA1C MFR BLD: 7.1 %
HCT VFR BLD CALC: 38.9 % (ref 40.5–52.5)
HEMOGLOBIN: 13 G/DL (ref 13.5–17.5)
INR BLD: 1.09 (ref 0.87–1.14)
LACTIC ACID: 1 MMOL/L (ref 0.4–2)
LYMPHOCYTES ABSOLUTE: 1.1 K/UL (ref 1–5.1)
LYMPHOCYTES RELATIVE PERCENT: 12 %
MAGNESIUM: 1.8 MG/DL (ref 1.8–2.4)
MCH RBC QN AUTO: 30.7 PG (ref 26–34)
MCHC RBC AUTO-ENTMCNC: 33.3 G/DL (ref 31–36)
MCV RBC AUTO: 92.1 FL (ref 80–100)
MONOCYTES ABSOLUTE: 1.1 K/UL (ref 0–1.3)
MONOCYTES RELATIVE PERCENT: 12.5 %
NEUTROPHILS ABSOLUTE: 6.4 K/UL (ref 1.7–7.7)
NEUTROPHILS RELATIVE PERCENT: 70.8 %
PDW BLD-RTO: 13.5 % (ref 12.4–15.4)
PERFORMED ON: ABNORMAL
PHOSPHORUS: 2.2 MG/DL (ref 2.5–4.9)
PLATELET # BLD: 177 K/UL (ref 135–450)
PMV BLD AUTO: 8.8 FL (ref 5–10.5)
POTASSIUM REFLEX MAGNESIUM: 3.8 MMOL/L (ref 3.5–5.1)
POTASSIUM SERPL-SCNC: 3.8 MMOL/L (ref 3.5–5.1)
PREALBUMIN: 13.2 MG/DL (ref 20–40)
PROTHROMBIN TIME: 14 SEC (ref 11.7–14.5)
RBC # BLD: 4.23 M/UL (ref 4.2–5.9)
SODIUM BLD-SCNC: 136 MMOL/L (ref 136–145)
TOTAL PROTEIN: 6.4 G/DL (ref 6.4–8.2)
TRANSFERRIN: 164 MG/DL (ref 200–360)
URINE CULTURE, ROUTINE: NORMAL
WBC # BLD: 9.1 K/UL (ref 4–11)

## 2022-05-28 PROCEDURE — 2580000003 HC RX 258: Performed by: EMERGENCY MEDICINE

## 2022-05-28 PROCEDURE — 84134 ASSAY OF PREALBUMIN: CPT

## 2022-05-28 PROCEDURE — 6370000000 HC RX 637 (ALT 250 FOR IP): Performed by: SURGERY

## 2022-05-28 PROCEDURE — 83605 ASSAY OF LACTIC ACID: CPT

## 2022-05-28 PROCEDURE — 6360000002 HC RX W HCPCS

## 2022-05-28 PROCEDURE — 80053 COMPREHEN METABOLIC PANEL: CPT

## 2022-05-28 PROCEDURE — 2060000000 HC ICU INTERMEDIATE R&B

## 2022-05-28 PROCEDURE — 6360000002 HC RX W HCPCS: Performed by: EMERGENCY MEDICINE

## 2022-05-28 PROCEDURE — 36415 COLL VENOUS BLD VENIPUNCTURE: CPT

## 2022-05-28 PROCEDURE — 84466 ASSAY OF TRANSFERRIN: CPT

## 2022-05-28 PROCEDURE — 83735 ASSAY OF MAGNESIUM: CPT

## 2022-05-28 PROCEDURE — 71045 X-RAY EXAM CHEST 1 VIEW: CPT

## 2022-05-28 PROCEDURE — 2500000003 HC RX 250 WO HCPCS: Performed by: INTERNAL MEDICINE

## 2022-05-28 PROCEDURE — 85025 COMPLETE CBC W/AUTO DIFF WBC: CPT

## 2022-05-28 PROCEDURE — 85730 THROMBOPLASTIN TIME PARTIAL: CPT

## 2022-05-28 PROCEDURE — 85610 PROTHROMBIN TIME: CPT

## 2022-05-28 PROCEDURE — 84100 ASSAY OF PHOSPHORUS: CPT

## 2022-05-28 PROCEDURE — 99232 SBSQ HOSP IP/OBS MODERATE 35: CPT | Performed by: SURGERY

## 2022-05-28 PROCEDURE — 2580000003 HC RX 258: Performed by: INTERNAL MEDICINE

## 2022-05-28 RX ORDER — FUROSEMIDE 10 MG/ML
20 INJECTION INTRAMUSCULAR; INTRAVENOUS ONCE
Status: COMPLETED | OUTPATIENT
Start: 2022-05-28 | End: 2022-05-28

## 2022-05-28 RX ORDER — POLYETHYLENE GLYCOL 3350 17 G/17G
17 POWDER, FOR SOLUTION ORAL DAILY
Status: DISCONTINUED | OUTPATIENT
Start: 2022-05-28 | End: 2022-05-31 | Stop reason: HOSPADM

## 2022-05-28 RX ORDER — DOCUSATE SODIUM 100 MG/1
100 CAPSULE, LIQUID FILLED ORAL 2 TIMES DAILY
Status: DISCONTINUED | OUTPATIENT
Start: 2022-05-28 | End: 2022-05-31 | Stop reason: HOSPADM

## 2022-05-28 RX ORDER — FUROSEMIDE 10 MG/ML
INJECTION INTRAMUSCULAR; INTRAVENOUS
Status: COMPLETED
Start: 2022-05-28 | End: 2022-05-28

## 2022-05-28 RX ADMIN — Medication 10 ML: at 10:59

## 2022-05-28 RX ADMIN — POLYETHYLENE GLYCOL 3350 17 G: 17 POWDER, FOR SOLUTION ORAL at 13:19

## 2022-05-28 RX ADMIN — Medication 10 ML: at 20:21

## 2022-05-28 RX ADMIN — Medication 20 UNITS/KG/HR: at 00:27

## 2022-05-28 RX ADMIN — POTASSIUM PHOSPHATE, MONOBASIC AND POTASSIUM PHOSPHATE, DIBASIC 10 MMOL: 224; 236 INJECTION, SOLUTION, CONCENTRATE INTRAVENOUS at 10:59

## 2022-05-28 RX ADMIN — DOCUSATE SODIUM 100 MG: 100 CAPSULE, LIQUID FILLED ORAL at 19:50

## 2022-05-28 RX ADMIN — DOCUSATE SODIUM 100 MG: 100 CAPSULE, LIQUID FILLED ORAL at 13:19

## 2022-05-28 RX ADMIN — FUROSEMIDE 20 MG: 10 INJECTION, SOLUTION INTRAMUSCULAR; INTRAVENOUS at 15:51

## 2022-05-28 RX ADMIN — SODIUM CHLORIDE, POTASSIUM CHLORIDE, SODIUM LACTATE AND CALCIUM CHLORIDE: 600; 310; 30; 20 INJECTION, SOLUTION INTRAVENOUS at 04:09

## 2022-05-28 RX ADMIN — Medication 20 UNITS/KG/HR: at 15:00

## 2022-05-28 RX ADMIN — FUROSEMIDE 20 MG: 10 INJECTION INTRAMUSCULAR; INTRAVENOUS at 15:51

## 2022-05-28 ASSESSMENT — PAIN SCALES - GENERAL: PAINLEVEL_OUTOF10: 0

## 2022-05-28 NOTE — PROGRESS NOTES
[facility] PROGRESS NOTE    5/28/2022 1:21 PM        Name: Meagan Pack .               Admitted: 5/27/2022  Primary Care Provider: Candie Montague MD (Tel: [phone])        Subjective:    Patient lying in bed nausea vomit improved NG tube removed passing gas    Reviewed interval ancillary notes    Current Medications  potassium phosphate 10 mmol in dextrose 5 % 250 mL IVPB, Once  docusate sodium (COLACE) capsule 100 mg, BID  polyethylene glycol (GLYCOLAX) packet 17 g, Daily  HYDROmorphone HCl PF (DILAUDID) injection 0.5 mg, Q1H PRN  heparin (porcine) injection 8,170 Units, PRN  heparin (porcine) injection 4,080 Units, PRN  heparin 25,000 units in dextrose 5% 250 mL (premix) infusion, Continuous  lisinopril (PRINIVIL;ZESTRIL) tablet 10 mg, Daily  lactated ringers infusion, Continuous  sodium chloride flush 0.9 % injection 5-40 mL, 2 times per day  sodium chloride flush 0.9 % injection 5-40 mL, PRN  0.9 % sodium chloride infusion, PRN  ondansetron (ZOFRAN-ODT) disintegrating tablet 4 mg, Q8H PRN   Or  ondansetron (ZOFRAN) injection 4 mg, Q6H PRN  polyethylene glycol (GLYCOLAX) packet 17 g, Daily PRN  acetaminophen (TYLENOL) tablet 650 mg, Q6H PRN   Or  acetaminophen (TYLENOL) suppository 650 mg, Q6H PRN  insulin lispro (HUMALOG) injection vial 0-12 Units, TID WC  insulin lispro (HUMALOG) injection vial 0-6 Units, Nightly  phenol 1.4 % mouth spray 1 spray, Q2H PRN  dextrose bolus 10% 125 mL, PRN   Or  dextrose bolus 10% 250 mL, PRN  glucagon (rDNA) injection 1 mg, PRN  dextrose 5 % solution, PRN        Objective:  /78   Pulse 92   Temp 98.6 °F (37 °C) (Temporal)   Resp 18   Ht 6' (1.829 m)   Wt 234 lb (106.1 kg)   SpO2 95%   BMI 31.74 kg/m²     Intake/Output Summary (Last 24 hours) at 5/28/2022 1321  Last data filed at 5/28/2022 1310  Gross per 24 hour   Intake 1442.64 ml   Output 2300 ml   Net -857.36 ml Wt Readings from Last 3 Encounters:   05/28/22 234 lb (106.1 kg)   05/25/22 227 lb (103 kg)   05/20/22 232 lb (105.2 kg)       General appearance:  Appears comfortable. AAOx3  HEENT: atraumatic, Pupils equal, muscous membranes moist, no masses appreciated  Cardiovascular: Regular rate and rhythm no murmurs appreciated  Respiratory: CTAB no wheezing  Gastrointestinal: Abdomen soft, non-tender, BS+  EXT: no edema  Neurology: no gross focal deficts  Psychiatry: Appropriate affect. Not agitated  Skin: Warm, dry, no rashes appreciated    Labs and Tests:  CBC:   Recent Labs     05/27/22  0655 05/28/22  0315   WBC 11.4* 9.1   HGB 14.3 13.0*    177     BMP:    Recent Labs     05/27/22  0655 05/28/22 0315   * 136   K 3.9 3.8  3.8   CL 97* 99   CO2 24 27   BUN 8 8   CREATININE 0.7* 0.7*   GLUCOSE 240* 171*     Hepatic:   Recent Labs     05/27/22  0655 05/28/22 0315   AST 17 16   ALT 22 16   BILITOT 1.2* 0.8   ALKPHOS 52 39*     XR ABDOMEN FOR NG/OG/NE TUBE PLACEMENT   Final Result   Tip of NG tube projects in region of gastric fundus      Scattered gaseous distention throughout the abdomen, incompletely evaluated         CT ABDOMEN PELVIS W IV CONTRAST Additional Contrast? None   Final Result   Mild to moderate postoperative ileus. RECOMMENDATIONS:   Unavailable         CT CHEST PULMONARY EMBOLISM W CONTRAST   Final Result   Small right lower lobe subsegmental pulmonary emboli. Findings were discussed with Maurice Cheney at 8:13 am on 5/27/2022. RECOMMENDATIONS:   Unavailable             Recent imaging reviewed    Problem List  Principal Problem:    Acute pulmonary embolism ([address]  Resolved Problems:    * No resolved hospital problems.  *          Assessment/Plan:   Acute pulmonary embolism  - heparin gtt     Ileus: ng tube removes p[edgar to start clears     Dm2; SSI     DVT prophylaxis heparin gtt  Code status full code       Alma Zhao MD   5/28/2022 1:21 PM

## 2022-05-28 NOTE — PROGRESS NOTES
[facility]        Progress Note    Pt Name: Treva Alexis  MRN: [id number]  YOB: 1950  Date of evaluation: 2022    Chief Complaint: abdominal pain      Subjective:    No acute events overnight  Pain significantly improved  No nausea with NG  Passing flatus but no stool    Vital Signs:  Patient Vitals for the past 24 hrs:   BP Temp Temp src Pulse Resp SpO2 Weight   22 0600 125/78 -- -- 92 18 95 % --   22 0500 -- -- -- -- -- -- 234 lb (106.1 kg)   22 0400 125/68 98.6 °F (37 °C) Temporal 86 19 94 % --   22 0200 125/74 -- -- 89 20 93 % --   22 0000 130/67 98.8 °F (37.1 °C) Temporal 93 20 91 % --   22 2200 135/69 -- -- 93 21 91 % --   22 2000 139/67 98.6 °F (37 °C) Temporal 94 20 93 % --   22 1800 (!) 123/58 -- -- 97 20 92 % --   22 1600 (!) 141/73 99.1 °F (37.3 °C) Temporal 90 23 94 % --   22 1505 -- -- -- 89 22 95 % --   22 1430 (!) 141/73 -- -- 88 22 95 % --   22 1415 125/64 -- -- 88 19 94 % --   22 1400 138/73 -- -- 88 21 94 % --   22 1345 125/75 -- -- 88 18 94 % --   22 1330 139/75 -- -- 87 18 95 % --   22 1315 126/74 98.8 °F (37.1 °C) Temporal 87 17 96 % --   22 1200 129/69 98.3 °F (36.8 °C) -- 89 18 92 % --   22 1130 -- -- -- -- -- 95 % --      TEMPERATURE HISTORY 24H: Temp (24hrs), Av.7 °F (37.1 °C), Min:98.3 °F (36.8 °C), Max:99.1 °F (37.3 °C)    BLOOD PRESSURE HISTORY: Systolic (07REM), WWQ:537 , Min:123 , JXS:711    Diastolic (48AOL), VWR:36, Min:58, Max:93      Intake/Output:    I/O last 3 completed shifts: In: 1442.6 [I.V.:792.6; NG/GT:650]  Out: 1800 [Urine:1800]  No intake/output data recorded.   Drain/tube Output:           Physical Exam:  General: awake, alert, oriented to  person, place, time  Cardiac: regular rate and rhythm   Pulmonary: clear to auscultation bilaterally   Abdomen: soft, less distended, non-tender    Labs:  CBC:    Recent Labs     05/27/22 0655 05/28/22 0315   WBC 11.4* 9.1   HGB 14.3 13.0*   HCT 43.4 38.9*    177     BMP:    Recent Labs     05/27/22 0655 05/28/22 0315   * 136   K 3.9 3.8  3.8   CL 97* 99   CO2 24 27   BUN 8 8   CREATININE 0.7* 0.7*   GLUCOSE 240* 171*     Hepatic:   Recent Labs     05/27/22 0655 05/28/22 0315   AST 17 16   ALT 22 16   BILITOT 1.2* 0.8   ALKPHOS 52 39*     Amylase: No results for input(s): AMYLASE in the last 72 hours. Lipase: No results for input(s): LIPASE in the last 72 hours. Mag:      Recent Labs     05/28/22 0315   MG 1.80      Phos:     Recent Labs     05/28/22 0315   PHOS 2.2*      Coags:   Recent Labs     05/27/22 2055 05/28/22 0315 05/28/22  0854   INR  --  1.09  --    APTT 61.8* 81.4* 80.9*       Cultures:  Relevant cultures documented under results section     Pathology:   No relevant pathology     Imaging:  I have personally reviewed the following films:    XR CHEST (2 VW)    Result Date: 5/11/2022  EXAMINATION: TWO XRAY VIEWS OF THE CHEST 5/11/2022 8:50 am COMPARISON: Chest x-ray dated 12/11/2017. HISTORY: ORDERING SYSTEM PROVIDED HISTORY: Preop testing TECHNOLOGIST PROVIDED HISTORY: Reason for Exam: Preop testing FINDINGS: HEART/MEDIASTINUM: The cardiomediastinal silhouette is within normal limits. PLEURA/LUNGS: There are no focal consolidations or pleural effusions. There is no appreciable pneumothorax. BONES/SOFT TISSUE: No acute abnormality. No radiographic evidence of acute pulmonary disease. CT ABDOMEN PELVIS W IV CONTRAST Additional Contrast? None    Result Date: 5/27/2022  EXAMINATION: CT OF THE ABDOMEN AND PELVIS WITH CONTRAST 5/27/2022 7:45 am TECHNIQUE: CT of the abdomen and pelvis was performed with the administration of intravenous contrast. Multiplanar reformatted images are provided for review.  Automated exposure control, iterative reconstruction, and/or weight based adjustment of the mA/kV was utilized to reduce the radiation dose to as low as reasonably achievable. COMPARISON: 04/26/2022 HISTORY: ORDERING SYSTEM PROVIDED HISTORY: abdominal pain, vomiting, fever, 2 days post op prostatectomy TECHNOLOGIST PROVIDED HISTORY: Additional Contrast?->None Reason for exam:->abdominal pain, vomiting, fever, 2 days post op prostatectomy Decision Support Exception - unselect if not a suspected or confirmed emergency medical condition->Emergency Medical Condition (MA) Reason for Exam: abdominal pain, vomiting, fever, 2 days post op prostatectomy FINDINGS: Lower Chest: There is no consolidation or effusion. Organs: The liver, pancreas, spleen, kidneys and adrenals are unremarkable aside from a benign 3 cm left adrenal myelolipoma in several stable indeterminate hypodense hepatic lesions. This finding requires no further imaging evaluation. GI/Bowel: There is mild distension of both large and small bowel with differential air-fluid levels. There is no discrete transition point. Pelvis: The bladder is decompressed by Costa catheter and thus not evaluated. Postsurgical changes of prostatectomy are noted. Peritoneum/Retroperitoneum: A small amount of ascites is present within the right lower quadrant. There is scattered postoperative pneumoperitoneum throughout the abdomen and pelvis. There is no adenopathy. Bones/Soft Tissues: There is no acute fracture or aggressive osseous lesion. Mild to moderate postoperative ileus. RECOMMENDATIONS: Unavailable     CT CHEST PULMONARY EMBOLISM W CONTRAST    Result Date: 5/27/2022  EXAMINATION: CTA OF THE CHEST 5/27/2022 7:46 am TECHNIQUE: CTA of the chest was performed after the administration of intravenous contrast.  Multiplanar reformatted images are provided for review. MIP images are provided for review. Automated exposure control, iterative reconstruction, and/or weight based adjustment of the mA/kV was utilized to reduce the radiation dose to as low as reasonably achievable. COMPARISON: None. HISTORY: ORDERING SYSTEM PROVIDED HISTORY: post-op fever and borderline hypoxia with tachycardia TECHNOLOGIST PROVIDED HISTORY: Reason for exam:->post-op fever and borderline hypoxia with tachycardia Decision Support Exception - unselect if not a suspected or confirmed emergency medical condition->Emergency Medical Condition (MA) Reason for Exam: post-op fever and borderline hypoxia with tachycardia FINDINGS: Pulmonary Arteries: Pulmonary arteries are adequately opacified for evaluation. There are a few tiny subsegmental occlusive/partially occlusive filling defects within the right lower lobe pulmonary arterial vessels. Main pulmonary artery is normal in caliber. Mediastinum: No evidence of mediastinal lymphadenopathy. The heart and pericardium demonstrate no acute abnormality. There is no acute abnormality of the thoracic aorta. Lungs/pleura: The lungs are without acute process. No focal consolidation or pulmonary edema. No evidence of pleural effusion or pneumothorax. Upper Abdomen: Limited images of the upper abdomen are unremarkable. Soft Tissues/Bones: No acute bone or soft tissue abnormality. Small right lower lobe subsegmental pulmonary emboli. Findings were discussed with Gabriela Enamorado at 8:13 am on 5/27/2022. RECOMMENDATIONS: Unavailable     XR ABDOMEN FOR NG/OG/NE TUBE PLACEMENT    Result Date: 5/27/2022  EXAMINATION: ONE SUPINE XRAY VIEW(S) OF THE ABDOMEN 5/27/2022 8:31 am COMPARISON: None. HISTORY: ORDERING SYSTEM PROVIDED HISTORY: Confirmation of course of NG/OG/NE tube and location of tip of tube TECHNOLOGIST PROVIDED HISTORY: Reason for exam:->Confirmation of course of NG/OG/NE tube and location of tip of tube Portable? ->Yes Reason for Exam: ng placement FINDINGS: Tip of NG tube is seen in the left upper quadrant in the region of the gastric fundus Gaseous distention is seen throughout the abdomen, incompletely evaluated.      Tip of NG tube projects in region of gastric fundus Scattered gaseous distention throughout the abdomen, incompletely evaluated       Scheduled Meds:   potassium phosphate IVPB  10 mmol IntraVENous Once    lisinopril  10 mg Oral Daily    sodium chloride flush  5-40 mL IntraVENous 2 times per day    insulin lispro  0-12 Units SubCUTAneous TID WC    insulin lispro  0-6 Units SubCUTAneous Nightly     Continuous Infusions:   heparin (PORCINE) Infusion 20 Units/kg/hr (05/28/22 0027)    lactated ringers 75 mL/hr at 05/28/22 0409    sodium chloride      dextrose       PRN Meds:. HYDROmorphone, heparin (porcine), heparin (porcine), sodium chloride flush, sodium chloride, ondansetron **OR** ondansetron, polyethylene glycol, acetaminophen **OR** acetaminophen, phenol, dextrose bolus **OR** dextrose bolus, glucagon (rDNA), dextrose      Assessment:  Patient Active Problem List   Diagnosis    Pure hypercholesterolemia    HTN (hypertension)    Type 2 diabetes mellitus without complication, without long-term current use of insulin (HCC)    Prostate cancer ([facility] [address]    Acute pulmonary embolism (HCC)     Postoperative ileus  Acute pulmonary embolism  Diabetes     Plan:  1. Clinically improving from post-operative ileus, unlikely to need surgical intervetion  2. Remove NG, advance to clear liquids  3. Monitor bowel function, flatus but no stool yet  4. IVF, monitor and replace electrolytes as needed  5. Pain medication and antiemetics as needed with caution as they may mask a worsening exam  6. May anticoagulate with heparin gtt from surgical standpoint, PO anticoagulation at discharge  7. Defer management of remainder of medical comorbidities to primary and consulting teams    Joselito Hernandez MD, FACS  5/28/2022  11:09 AM

## 2022-05-28 NOTE — PROGRESS NOTES
gu note    vss afeb  Feels better  Less sob    abd softer with ngt    Urine clear and great output    Labs nl    impr    Sp rrp wed  dvt  Ileus/sbo    recc  Cont simpson for week  ngt per gs  oob  Cont blood thinners per med  No sign surg bleeding

## 2022-05-29 LAB
ANION GAP SERPL CALCULATED.3IONS-SCNC: 13 MMOL/L (ref 3–16)
APTT: 152.2 SEC (ref 23–34.3)
APTT: 59.4 SEC (ref 23–34.3)
APTT: 66.4 SEC (ref 23–34.3)
BASOPHILS ABSOLUTE: 0.1 K/UL (ref 0–0.2)
BASOPHILS RELATIVE PERCENT: 0.6 %
BUN BLDV-MCNC: 10 MG/DL (ref 7–20)
CALCIUM SERPL-MCNC: 8.4 MG/DL (ref 8.3–10.6)
CHLORIDE BLD-SCNC: 97 MMOL/L (ref 99–110)
CO2: 27 MMOL/L (ref 21–32)
CREAT SERPL-MCNC: 0.7 MG/DL (ref 0.8–1.3)
EOSINOPHILS ABSOLUTE: 0.7 K/UL (ref 0–0.6)
EOSINOPHILS RELATIVE PERCENT: 7.2 %
GFR AFRICAN AMERICAN: >60
GFR NON-AFRICAN AMERICAN: >60
GLUCOSE BLD-MCNC: 126 MG/DL (ref 70–99)
GLUCOSE BLD-MCNC: 128 MG/DL (ref 70–99)
GLUCOSE BLD-MCNC: 132 MG/DL (ref 70–99)
GLUCOSE BLD-MCNC: 152 MG/DL (ref 70–99)
GLUCOSE BLD-MCNC: 154 MG/DL (ref 70–99)
HCT VFR BLD CALC: 38.6 % (ref 40.5–52.5)
HEMOGLOBIN: 12.9 G/DL (ref 13.5–17.5)
LYMPHOCYTES ABSOLUTE: 1.9 K/UL (ref 1–5.1)
LYMPHOCYTES RELATIVE PERCENT: 20.9 %
MCH RBC QN AUTO: 30.3 PG (ref 26–34)
MCHC RBC AUTO-ENTMCNC: 33.4 G/DL (ref 31–36)
MCV RBC AUTO: 90.9 FL (ref 80–100)
MONOCYTES ABSOLUTE: 1.2 K/UL (ref 0–1.3)
MONOCYTES RELATIVE PERCENT: 13.3 %
NEUTROPHILS ABSOLUTE: 5.3 K/UL (ref 1.7–7.7)
NEUTROPHILS RELATIVE PERCENT: 58 %
PDW BLD-RTO: 13.6 % (ref 12.4–15.4)
PERFORMED ON: ABNORMAL
PLATELET # BLD: 206 K/UL (ref 135–450)
PMV BLD AUTO: 8.6 FL (ref 5–10.5)
POTASSIUM REFLEX MAGNESIUM: 3.8 MMOL/L (ref 3.5–5.1)
RBC # BLD: 4.25 M/UL (ref 4.2–5.9)
SODIUM BLD-SCNC: 137 MMOL/L (ref 136–145)
WBC # BLD: 9.2 K/UL (ref 4–11)

## 2022-05-29 PROCEDURE — 97162 PT EVAL MOD COMPLEX 30 MIN: CPT

## 2022-05-29 PROCEDURE — 6370000000 HC RX 637 (ALT 250 FOR IP): Performed by: INTERNAL MEDICINE

## 2022-05-29 PROCEDURE — 97116 GAIT TRAINING THERAPY: CPT

## 2022-05-29 PROCEDURE — 85730 THROMBOPLASTIN TIME PARTIAL: CPT

## 2022-05-29 PROCEDURE — 85025 COMPLETE CBC W/AUTO DIFF WBC: CPT

## 2022-05-29 PROCEDURE — 6360000002 HC RX W HCPCS: Performed by: EMERGENCY MEDICINE

## 2022-05-29 PROCEDURE — 97165 OT EVAL LOW COMPLEX 30 MIN: CPT

## 2022-05-29 PROCEDURE — 36415 COLL VENOUS BLD VENIPUNCTURE: CPT

## 2022-05-29 PROCEDURE — 97530 THERAPEUTIC ACTIVITIES: CPT

## 2022-05-29 PROCEDURE — 2580000003 HC RX 258: Performed by: EMERGENCY MEDICINE

## 2022-05-29 PROCEDURE — 2060000000 HC ICU INTERMEDIATE R&B

## 2022-05-29 PROCEDURE — 99232 SBSQ HOSP IP/OBS MODERATE 35: CPT | Performed by: SURGERY

## 2022-05-29 PROCEDURE — 80048 BASIC METABOLIC PNL TOTAL CA: CPT

## 2022-05-29 PROCEDURE — 2580000003 HC RX 258: Performed by: INTERNAL MEDICINE

## 2022-05-29 PROCEDURE — 6370000000 HC RX 637 (ALT 250 FOR IP): Performed by: SURGERY

## 2022-05-29 RX ADMIN — Medication 21 UNITS/KG/HR: at 19:57

## 2022-05-29 RX ADMIN — Medication 10 ML: at 20:38

## 2022-05-29 RX ADMIN — Medication 22 UNITS/KG/HR: at 04:26

## 2022-05-29 RX ADMIN — POLYETHYLENE GLYCOL 3350 17 G: 17 POWDER, FOR SOLUTION ORAL at 09:41

## 2022-05-29 RX ADMIN — SODIUM CHLORIDE: 9 INJECTION, SOLUTION INTRAVENOUS at 19:53

## 2022-05-29 RX ADMIN — DOCUSATE SODIUM 100 MG: 100 CAPSULE, LIQUID FILLED ORAL at 20:39

## 2022-05-29 RX ADMIN — Medication 10 ML: at 09:41

## 2022-05-29 RX ADMIN — DOCUSATE SODIUM 100 MG: 100 CAPSULE, LIQUID FILLED ORAL at 09:41

## 2022-05-29 RX ADMIN — INSULIN LISPRO 2 UNITS: 100 INJECTION, SOLUTION INTRAVENOUS; SUBCUTANEOUS at 07:41

## 2022-05-29 RX ADMIN — HEPARIN SODIUM 4080 UNITS: 1000 INJECTION INTRAVENOUS; SUBCUTANEOUS at 19:37

## 2022-05-29 RX ADMIN — Medication 19 UNITS/KG/HR: at 12:05

## 2022-05-29 RX ADMIN — LISINOPRIL 10 MG: 10 TABLET ORAL at 09:41

## 2022-05-29 RX ADMIN — HEPARIN SODIUM 4080 UNITS: 1000 INJECTION INTRAVENOUS; SUBCUTANEOUS at 04:21

## 2022-05-29 NOTE — PROGRESS NOTES
[facility] PROGRESS NOTE    5/29/2022 1:26 PM        Name: Ronnie Stephenson . Admitted: 5/27/2022  Primary Care Provider: Michael Garcia MD (Tel: [phone])        Subjective:     In bed having some nausea abdominal discomfort improved passing gas no bowel movement tolerating clears no chest pain fever chills    Reviewed interval ancillary notes    Current Medications  docusate sodium (COLACE) capsule 100 mg, BID  polyethylene glycol (GLYCOLAX) packet 17 g, Daily  HYDROmorphone HCl PF (DILAUDID) injection 0.5 mg, Q1H PRN  heparin (porcine) injection 8,170 Units, PRN  heparin (porcine) injection 4,080 Units, PRN  heparin 25,000 units in dextrose 5% 250 mL (premix) infusion, Continuous  lisinopril (PRINIVIL;ZESTRIL) tablet 10 mg, Daily  sodium chloride flush 0.9 % injection 5-40 mL, 2 times per day  sodium chloride flush 0.9 % injection 5-40 mL, PRN  0.9 % sodium chloride infusion, PRN  ondansetron (ZOFRAN-ODT) disintegrating tablet 4 mg, Q8H PRN   Or  ondansetron (ZOFRAN) injection 4 mg, Q6H PRN  polyethylene glycol (GLYCOLAX) packet 17 g, Daily PRN  acetaminophen (TYLENOL) tablet 650 mg, Q6H PRN   Or  acetaminophen (TYLENOL) suppository 650 mg, Q6H PRN  insulin lispro (HUMALOG) injection vial 0-12 Units, TID WC  insulin lispro (HUMALOG) injection vial 0-6 Units, Nightly  phenol 1.4 % mouth spray 1 spray, Q2H PRN  dextrose bolus 10% 125 mL, PRN   Or  dextrose bolus 10% 250 mL, PRN  glucagon (rDNA) injection 1 mg, PRN  dextrose 5 % solution, PRN        Objective:  /84   Pulse 89   Temp 97.7 °F (36.5 °C) (Temporal)   Resp 24   Ht 6' (1.829 m)   Wt 227 lb 4.7 oz (103.1 kg)   SpO2 95%   BMI 30.83 kg/m²     Intake/Output Summary (Last 24 hours) at 5/29/2022 1326  Last data filed at 5/29/2022 1200  Gross per 24 hour   Intake 2519.08 ml   Output 1950 ml   Net 569.08 ml      Wt Readings from Last 3    Assessment/Plan:   Acute pulmonary embolism  - heparin gtt     Ileus: Clears for today we will get patient out of bed hopefully PT OT work with him getting moving around repeat labs in the morning     Dm2; SSI     DVT prophylaxis heparin gtt  Code status full code       Eladio Roberts MD   5/29/2022 1:26 PM

## 2022-05-29 NOTE — PROGRESS NOTES
[facility]   Phone: [phone]    Occupational Therapy    [x] Initial Evaluation            [] Daily Treatment Note         [] Discharge Summary      Patient: Jolynn Ulloa   : 1950   MRN: [id number]   Date of Service:  2022    Admitting Diagnosis:  Acute pulmonary embolism [facility])  Current Admission Summary:   Past Medical History:  has a past medical history of Hyperlipidemia, Hypertension, and Type II or unspecified type diabetes mellitus without mention of complication, not stated as uncontrolled. Past Surgical History:  has a past surgical history that includes shoulder surgery (Right, 10/30/2013); Foot surgery; Knee arthroscopy; and Prostatectomy (N/A, 2022). Discharge Recommendations: Jolynn Ulloa scored a 22/24 on the [facility] ADL Inpatient form. At this time, no further OT is recommended upon discharge due to pt and wife report pt has no OT needs at this time. He reports that he does not anticipate any difficulty with ADL skills. During evaluation pt declined ADL skills and education w/ AE for LB dressing. During evaluation pt demonstrated the ability to perform transfers indep and ambulated 810 ft w/ RW. Recommend patient returns to prior setting with prior services. DME Required For Discharge: DME to be determined pending patient progress    Precautions/Restrictions: low fall risk  Weight Bearing Restrictions: no restrictions  [] Right Upper Extremity  [] Left Upper Extremity [] Right Lower Extremity  [] Left Lower Extremity     Required Braces/Orthotics: no braces required   [] Right  [] Left  Positional Restrictions:no positional restrictions    Pre-Admission Information   Lives With: spouse, daughter, grandchildren                         Type of Home: house  Home Layout: quad level , .   Comment: with a basement  Home Access: 2 step to enter without rails   Bathroom Layout: walker accessible, wheelchair accessible, tub/shower unit  Toilet Height: elevated height  Bathroom Equipment: shower chair, hand held shower head, . Comment: do not keep shower chair in shower all the time  Home Equipment: rolling walker, single point cane, reacher  Transfer Assistance: Independent without use of device  Ambulation Assistance:Independent without use of device  ADL Assistance: independent with all ADL's  IADL Assistance: independent with homemaking tasks  Active :        [x]? Yes                 []? No  Hand Dominance: []? Left                 [x]? Right  Current Employment: full time employment. 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 Pt wife was present and supportive throughout therapy session. Pain: 4/10. Location: lower abdomen  Pain Interventions: pain medication in place prior to arrival        Activities of Daily Living  Basic Activities of Daily Living  General Comments: pt declined adl skills training and education w/ AE during evaluation. pt and wife report they do not anticipate difficulty w/ ADL skills when he is able to shower and dress. He reported that he bathed this morning with nursing. pt and wife eduated w/ bathing when d/c and safety considerations. Instrumental Activities of Daily Living  No IADL completed on this date. Functional Mobility  Bed Mobility  Bed mobility not completed on this date.   Comments:  Transfers  Sit to stand transfer:modified independent  Stand to sit transfer: modified independent  Comments:  Functional Mobility:  Standing Balance: modified independent. Functional Mobility: rolling walker. contact guard assistance. Activity: 810 ft    Other Therapeutic Interventions    Functional Outcomes  AM-PAC Inpatient Daily Activity Raw Score: 22    Cognition  WFL  Orientation:    alert and oriented x 4  Command Following:   [facility]     Education  Barriers To Learning: none  Patient Education: patient educated on OT role and benefits, precautions, ADL adaptive strategies, discharge recommendations, discussed safety concerns at d/c and adaptive strategies, however, pt declined education w/ AE. Learning Assessment:  patient verbalizes and demonstrates understanding    Assessment  Activity Tolerance: pt tolerated activities throughout therapy session. Pt ambulated 810 ft (3 laps) w/o O2. O2 was checked upon return to room with 98%   Impairments Requiring Therapeutic Intervention: none - eval with same day discharge  Prognosis: excellent  Clinical Assessment: pt presents to therapy evaluation with decreased endurance, however, he and his wife have no concerns about ADL skills functioning and/or IADL skills due to family support at home. Pt demonstrated the ability to perform functional transfers mod I w/ RW for support. Pt ambulated the 810 ft during therapy evaluation without difficulty. No OT needs at this time. Safety Interventions: patient left in chair, chair alarm in place, nurse notified and family/caregiver present    Plan  Frequency: Eval with same day discharge. No follow up required. Current Treatment Recommendations: not applicable, evaluation completed with same day discharge. Goals  Patient Goals: return home   Short Term Goals:  Time Frame: d/c  Patient eval with same day discharge. No goals set secondary to pt and wife report no concerns with ADL skills.      Therapy Session Time     Individual Group Co-treatment   Time In    1410   Time Out    1503   Minutes    53        Timed Code Treatment Minutes:   38 min  Total Treatment Minutes:  53 min       Electronically Signed By: LEVI Ponce OTR/L [id number]

## 2022-05-29 NOTE — PROGRESS NOTES
Walked around the unit 6 times today. Up to chair this AM, then walked with PT/OT at noon 3 times around the unit. up now for 3 times around the unit. Then to bathroom where patient was able to pass gas and mucus. Pt. Waleska Tom does feels better. Abdomen was tight and bowel sounds active all day. Pt. To try some clear liquids for dinner up in the chair. Family at bedside. Chair alarm intact and call light in reach. Catheter with some leaking observed in brief.

## 2022-05-29 NOTE — PROGRESS NOTES
gu note    vss afeb    abd soft  Urine clear  Labs stable    impr    Sp rrp  pe  Ileus    recc    No sx surg bleeding  Cont anticoag  Diet per gs  Costa for week

## 2022-05-29 NOTE — PROGRESS NOTES
[facility]        Progress Note    Pt Name: Nicci Trujillo  MRN: [id number]  YOB: 1950  Date of evaluation: 2022    Chief Complaint: abdominal pain      Subjective:    No acute events overnight  Pain minimal  Mild bloating with clear liquids and miralax, not hungry  Passing flatus but no stool    Vital Signs:  Patient Vitals for the past 24 hrs:   BP Temp Temp src Pulse Resp SpO2 Weight   22 0600 115/79 -- -- 82 21 97 % --   22 0400 120/75 98.4 °F (36.9 °C) Temporal 84 21 97 % 227 lb 4.7 oz (103.1 kg)   22 0200 106/72 -- -- 86 19 94 % --   22 0000 128/78 98.3 °F (36.8 °C) Temporal 94 20 94 % --   22 2200 (!) 98/58 -- -- 96 26 93 % --   22 2000 128/79 98.5 °F (36.9 °C) Temporal 93 (!) 34 94 % --   22 1800 126/75 -- -- (!) 101 24 97 % --   22 1600 132/88 98.3 °F (36.8 °C) Temporal 95 20 95 % --   22 1400 (!) 144/73 -- -- 93 23 95 % --   22 1200 133/79 98.6 °F (37 °C) Temporal 94 21 93 % --      TEMPERATURE HISTORY 24H: Temp (24hrs), Av.4 °F (36.9 °C), Min:98.3 °F (36.8 °C), Max:98.6 °F (37 °C)    BLOOD PRESSURE HISTORY: Systolic (47CHO), LBX:661 , Min:98 , EZH:365    Diastolic (00NQH), YRQ:45, Min:58, Max:88      Intake/Output:    I/O last 3 completed shifts: In: 3951.7 [P.O.:450; I.V.:2611.6; NG/GT:640; IV Piggyback:250.1]  Out: 3850 [Urine:3150; Emesis/NG output:700]  No intake/output data recorded.   Drain/tube Output:           Physical Exam:  General: awake, alert, oriented to  person, place, time  Cardiac: regular rate and rhythm   Pulmonary: clear to auscultation bilaterally   Abdomen: soft, mildly distended, non-tender    Labs:  CBC:    Recent Labs     22  0655 22  0315 22  0142   WBC 11.4* 9.1 9.2   HGB 14.3 13.0* 12.9*   HCT 43.4 38.9* 38.6*    177 206     BMP:    Recent Labs     22  0655 22  0655 22  0315 22  0142   *  --  136 137   K 3.9   < > 3.8  3.8 3.8   CL 97*  --  99 97*   CO2 24  --  27 27   BUN 8  --  8 10   CREATININE 0.7*  --  0.7* 0.7*   GLUCOSE 240*  --  171* 152*    < > = values in this interval not displayed. Hepatic:   Recent Labs     05/27/22  0655 05/28/22  0315   AST 17 16   ALT 22 16   BILITOT 1.2* 0.8   ALKPHOS 52 39*     Amylase: No results for input(s): AMYLASE in the last 72 hours. Lipase: No results for input(s): LIPASE in the last 72 hours. Mag:      Recent Labs     05/28/22  0315   MG 1.80      Phos:     Recent Labs     05/28/22  0315   PHOS 2.2*      Coags:   Recent Labs     05/28/22  0315 05/28/22  0854 05/29/22  0142   INR 1.09  --   --    APTT 81.4* 80.9* 66.4*       Cultures:  Relevant cultures documented under results section     Pathology:   No relevant pathology     Imaging:  I have personally reviewed the following films:    XR CHEST (2 VW)    Result Date: 5/11/2022  EXAMINATION: TWO XRAY VIEWS OF THE CHEST 5/11/2022 8:50 am COMPARISON: Chest x-ray dated 12/11/2017. HISTORY: ORDERING SYSTEM PROVIDED HISTORY: Preop testing TECHNOLOGIST PROVIDED HISTORY: Reason for Exam: Preop testing FINDINGS: HEART/MEDIASTINUM: The cardiomediastinal silhouette is within normal limits. PLEURA/LUNGS: There are no focal consolidations or pleural effusions. There is no appreciable pneumothorax. BONES/SOFT TISSUE: No acute abnormality. No radiographic evidence of acute pulmonary disease. CT ABDOMEN PELVIS W IV CONTRAST Additional Contrast? None    Result Date: 5/27/2022  EXAMINATION: CT OF THE ABDOMEN AND PELVIS WITH CONTRAST 5/27/2022 7:45 am TECHNIQUE: CT of the abdomen and pelvis was performed with the administration of intravenous contrast. Multiplanar reformatted images are provided for review. Automated exposure control, iterative reconstruction, and/or weight based adjustment of the mA/kV was utilized to reduce the radiation dose to as low as reasonably achievable.  COMPARISON: 04/26/2022 HISTORY: with tachycardia TECHNOLOGIST PROVIDED HISTORY: Reason for exam:->post-op fever and borderline hypoxia with tachycardia Decision Support Exception - unselect if not a suspected or confirmed emergency medical condition->Emergency Medical Condition (MA) Reason for Exam: post-op fever and borderline hypoxia with tachycardia FINDINGS: Pulmonary Arteries: Pulmonary arteries are adequately opacified for evaluation. There are a few tiny subsegmental occlusive/partially occlusive filling defects within the right lower lobe pulmonary arterial vessels. Main pulmonary artery is normal in caliber. Mediastinum: No evidence of mediastinal lymphadenopathy. The heart and pericardium demonstrate no acute abnormality. There is no acute abnormality of the thoracic aorta. Lungs/pleura: The lungs are without acute process. No focal consolidation or pulmonary edema. No evidence of pleural effusion or pneumothorax. Upper Abdomen: Limited images of the upper abdomen are unremarkable. Soft Tissues/Bones: No acute bone or soft tissue abnormality. Small right lower lobe subsegmental pulmonary emboli. Findings were discussed with Gene Diamond at 8:13 am on 5/27/2022. RECOMMENDATIONS: Unavailable     XR ABDOMEN FOR NG/OG/NE TUBE PLACEMENT    Result Date: 5/27/2022  EXAMINATION: ONE SUPINE XRAY VIEW(S) OF THE ABDOMEN 5/27/2022 8:31 am COMPARISON: None. HISTORY: ORDERING SYSTEM PROVIDED HISTORY: Confirmation of course of NG/OG/NE tube and location of tip of tube TECHNOLOGIST PROVIDED HISTORY: Reason for exam:->Confirmation of course of NG/OG/NE tube and location of tip of tube Portable? ->Yes Reason for Exam: ng placement FINDINGS: Tip of NG tube is seen in the left upper quadrant in the region of the gastric fundus Gaseous distention is seen throughout the abdomen, incompletely evaluated.      Tip of NG tube projects in region of gastric fundus Scattered gaseous distention throughout the abdomen, incompletely evaluated       Scheduled Meds:   docusate sodium  100 mg Oral BID    polyethylene glycol  17 g Oral Daily    lisinopril  10 mg Oral Daily    sodium chloride flush  5-40 mL IntraVENous 2 times per day    insulin lispro  0-12 Units SubCUTAneous TID WC    insulin lispro  0-6 Units SubCUTAneous Nightly     Continuous Infusions:   heparin (PORCINE) Infusion 22 Units/kg/hr (05/29/22 0426)    sodium chloride      dextrose       PRN Meds:. HYDROmorphone, heparin (porcine), heparin (porcine), sodium chloride flush, sodium chloride, ondansetron **OR** ondansetron, polyethylene glycol, acetaminophen **OR** acetaminophen, phenol, dextrose bolus **OR** dextrose bolus, glucagon (rDNA), dextrose      Assessment:  Patient Active Problem List   Diagnosis    Pure hypercholesterolemia    HTN (hypertension)    Type 2 diabetes mellitus without complication, without long-term current use of insulin (HCC)    Prostate cancer ([facility] [address]    Acute pulmonary embolism (HCC)     Postoperative ileus  Acute pulmonary embolism  Diabetes     Plan:  1. Clinically improving from post-operative ileus, unlikely to need surgical intervetion  2. Hold at clear liquids, monitor early satiety  3. Monitor bowel function, flatus but no stool yet, continue bowel regimen  4. IVF, monitor and replace electrolytes as needed  5. Pain medication and antiemetics as needed with caution as they may mask a worsening exam  6. May anticoagulate with heparin gtt from surgical standpoint, PO anticoagulation at discharge  7. Defer management of remainder of medical comorbidities to primary and consulting teams    Joselito Garcia MD, FACS  5/29/2022  10:16 AM

## 2022-05-29 NOTE — PROGRESS NOTES
Heparin gtt restarted as per protocol. Pt. Up in chair with chair alarm intact. Simpson with leakage at times. Pt. Bathed and simpson care completed. No BM. Poor PO. Declined clear for breakfast. Will try PO now for lunch.

## 2022-05-29 NOTE — PROGRESS NOTES
[facility]   Phone: [phone]    Physical Therapy    [] Initial Evaluation            [x] Daily Treatment Note         [] Discharge Summary      Patient: Elizabeth Saavedra   : 1950   MRN: [id number]   Date of Service:  2022  Admitting Diagnosis: Acute pulmonary embolism [facility])  Current Admission Summary: Elizabeth Saavedra is a 80 y.o. male who had prostatectomy for prostate cancer on the . Patient felt better but then this morning to have nausea and multiple episode of vomiting had a low-grade fever yesterday patient denies any chest pain cough shortness of breath no diarrhea last bowel movement was before the prostatectomy. Past Medical History:  has a past medical history of Hyperlipidemia, Hypertension, and Type II or unspecified type diabetes mellitus without mention of complication, not stated as uncontrolled. Past Surgical History:  has a past surgical history that includes shoulder surgery (Right, 10/30/2013); Foot surgery; Knee arthroscopy; and Prostatectomy (N/A, 2022). Discharge Recommendations:Rosalino Cooley scored a  on the AM-PAC short mobility form. At this time, no further PT is recommended upon discharge due to pt without skilled needs. Recommend patient returns to prior setting with prior services. If patient discharges prior to next session this note will serve as a discharge summary. Please see below for the latest assessment towards goals.      DME Required For Discharge: no DME required at discharge  Precautions/Restrictions: no restrictions  Weight Bearing Restrictions: weight bearing as tolerated  [] Right Upper Extremity  [] Left Upper Extremity [] Right Lower Extremity  [] Left Lower Extremity     Required Braces/Orthotics: no braces required   [] Right  [] Left  Positional Restrictions:no positional restrictions    Pre-Admission Information   Lives With: spouse, daughter, grandchildren    Type of Home: house  Home Layout: quad level , . Comment: with a basement  Home Access: 2 step to enter without rails   Bathroom Layout: walker accessible, wheelchair accessible, tub/shower unit  Toilet Height: elevated height  Bathroom Equipment: shower chair, hand held shower head, . Comment: do not keep shower chair in shower all the time  Home Equipment: rolling walker, single point cane, reacher  Transfer Assistance: Independent without use of device  Ambulation Assistance:Independent without use of device  ADL Assistance: independent with all ADL's  IADL Assistance: independent with homemaking tasks  Active :        [x] Yes  [] No  Hand Dominance: [] Left  [x] Right  Current Employment: full time employment. 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 States he hopes to d/c today  Pain: 0/10  Pain Interventions: not applicable       Functional Mobility  Bed Mobility  Supine to Sit: modified independent  Comments: HOB elevated. Transfers  Sit to stand transfer: Independent, minimal assistance  Stand to sit transfer: Independent  Comments: when pt stood from bed and from chair pt was ind. Pt had a slight LOB posteriorly when standing from toilet, requiring min assist and reaching strategy for grab bar to recover, pt states he does not know what happened, just lost his balance. Ambulation  Surface:level surface  Assistive Device: no device  Assistance: modified independent, supervision  Distance: 500  Gait Mechanics: Pt with ER of right foot which is baseline from previous foot surgery. Mild path deviation, normal gait speed. Comments:  Pt initially supv for safety ambulating in room without device, once moving around more pt ind with basic gait.   Stair Mobility  Number of Steps: 2  Hand Rails: (L) ascending handrail  Assistance: supervision   Comments: number of stairs limited by pt's IV lines    Balance  Static Sitting Balance: good(+): independent with high level dynamic balance in unsupported position  Dynamic Sitting Balance: good(+): independent with high level dynamic balance in unsupported position  Static Standing Balance: good: independent with functional balance in unsupported position  Dynamic Standing Balance: good: independent with functional balance in unsupported position  Comments:    Other Therapeutic Interventions  When pt stood up his pt leaked urine despite having catheter. Pt went to bathroom and required some assist for changing his depends, due to having to thread simpson bag into depends. Pt performing pericare independently. Functional Outcomes  AM-PAC Inpatient Mobility Raw Score : 21              Cognition  WFL  Orientation:    alert and oriented x 4  Command Following:   [facility]    Education  Barriers To Learning: none  Patient Education: patient educated on goals, PT role and benefits, plan of care, general safety, functional mobility training, discharge recommendations  Learning Assessment:  patient verbalizes and demonstrates understanding    Assessment  Activity Tolerance: good, HR 98bpm after gait  Impairments Requiring Therapeutic Intervention: decreased functional mobility, decreased endurance, decreased balance  Prognosis: excellent  Clinical Assessment: Pt is making good progress in his mobility, able to ambulate without device today and trial steps. Unable to meet stair goal this date due to IV limiting number of steps pt able to do. Pt is to go home today, no further therapy recommended at d/c, discussed pt making frequents short walks to work on improving activity tolerance and endurance. Pt hopeful to d/c to home today, if he does not will follow up for 1 more treatment to ensure he can perform flight of steps safely.    Safety Interventions: patient left in chair, call light within reach, gait belt and family/caregiver present    Plan  Frequency: 5-7 x/week  Current Treatment Recommendations: strengthening, balance training, functional mobility training, gait training, stair training and endurance training    Goals  Patient Goals: return home   Short Term Goals:  Time Frame: by discharge, goal 1 met, goal 2 not met 5/31/22  Patient will ambulate 270 ft with use of no device at Independent  Patient will ascend/descend 12 stairs with (R) ascending handrail at [address]   Time In 0859       Time Out 0924       Minutes 25         Timed Code Treatment Minutes:   25  Total Treatment Minutes:  25       Electronically Signed By: Jeronimo Story, [address] [place] [address]

## 2022-05-30 LAB
ANION GAP SERPL CALCULATED.3IONS-SCNC: 15 MMOL/L (ref 3–16)
APTT: 53.1 SEC (ref 23–34.3)
APTT: 88.3 SEC (ref 23–34.3)
APTT: >180 SEC (ref 23–34.3)
BASOPHILS ABSOLUTE: 0.1 K/UL (ref 0–0.2)
BASOPHILS RELATIVE PERCENT: 0.6 %
BUN BLDV-MCNC: 11 MG/DL (ref 7–20)
CALCIUM SERPL-MCNC: 8.6 MG/DL (ref 8.3–10.6)
CHLORIDE BLD-SCNC: 94 MMOL/L (ref 99–110)
CO2: 25 MMOL/L (ref 21–32)
CREAT SERPL-MCNC: 0.7 MG/DL (ref 0.8–1.3)
EOSINOPHILS ABSOLUTE: 0.9 K/UL (ref 0–0.6)
EOSINOPHILS RELATIVE PERCENT: 9.8 %
GFR AFRICAN AMERICAN: >60
GFR NON-AFRICAN AMERICAN: >60
GLUCOSE BLD-MCNC: 123 MG/DL (ref 70–99)
GLUCOSE BLD-MCNC: 126 MG/DL (ref 70–99)
GLUCOSE BLD-MCNC: 140 MG/DL (ref 70–99)
GLUCOSE BLD-MCNC: 148 MG/DL (ref 70–99)
GLUCOSE BLD-MCNC: 161 MG/DL (ref 70–99)
HCT VFR BLD CALC: 39 % (ref 40.5–52.5)
HEMOGLOBIN: 12.9 G/DL (ref 13.5–17.5)
LYMPHOCYTES ABSOLUTE: 1.6 K/UL (ref 1–5.1)
LYMPHOCYTES RELATIVE PERCENT: 17.7 %
MCH RBC QN AUTO: 30.1 PG (ref 26–34)
MCHC RBC AUTO-ENTMCNC: 33 G/DL (ref 31–36)
MCV RBC AUTO: 91.4 FL (ref 80–100)
MONOCYTES ABSOLUTE: 1.2 K/UL (ref 0–1.3)
MONOCYTES RELATIVE PERCENT: 12.7 %
NEUTROPHILS ABSOLUTE: 5.5 K/UL (ref 1.7–7.7)
NEUTROPHILS RELATIVE PERCENT: 59.2 %
PDW BLD-RTO: 13.2 % (ref 12.4–15.4)
PERFORMED ON: ABNORMAL
PLATELET # BLD: 230 K/UL (ref 135–450)
PMV BLD AUTO: 8.8 FL (ref 5–10.5)
POTASSIUM REFLEX MAGNESIUM: 3.8 MMOL/L (ref 3.5–5.1)
RBC # BLD: 4.26 M/UL (ref 4.2–5.9)
SODIUM BLD-SCNC: 134 MMOL/L (ref 136–145)
WBC # BLD: 9.2 K/UL (ref 4–11)

## 2022-05-30 PROCEDURE — 85025 COMPLETE CBC W/AUTO DIFF WBC: CPT

## 2022-05-30 PROCEDURE — 36415 COLL VENOUS BLD VENIPUNCTURE: CPT

## 2022-05-30 PROCEDURE — 2060000000 HC ICU INTERMEDIATE R&B

## 2022-05-30 PROCEDURE — 94760 N-INVAS EAR/PLS OXIMETRY 1: CPT

## 2022-05-30 PROCEDURE — 6360000002 HC RX W HCPCS: Performed by: EMERGENCY MEDICINE

## 2022-05-30 PROCEDURE — 6370000000 HC RX 637 (ALT 250 FOR IP): Performed by: INTERNAL MEDICINE

## 2022-05-30 PROCEDURE — 2580000003 HC RX 258: Performed by: EMERGENCY MEDICINE

## 2022-05-30 PROCEDURE — 2580000003 HC RX 258: Performed by: INTERNAL MEDICINE

## 2022-05-30 PROCEDURE — 6370000000 HC RX 637 (ALT 250 FOR IP): Performed by: SURGERY

## 2022-05-30 PROCEDURE — 80048 BASIC METABOLIC PNL TOTAL CA: CPT

## 2022-05-30 PROCEDURE — 99232 SBSQ HOSP IP/OBS MODERATE 35: CPT | Performed by: SURGERY

## 2022-05-30 PROCEDURE — 85730 THROMBOPLASTIN TIME PARTIAL: CPT

## 2022-05-30 RX ADMIN — Medication 22 UNITS/KG/HR: at 20:51

## 2022-05-30 RX ADMIN — POLYETHYLENE GLYCOL 3350 17 G: 17 POWDER, FOR SOLUTION ORAL at 08:45

## 2022-05-30 RX ADMIN — INSULIN LISPRO 2 UNITS: 100 INJECTION, SOLUTION INTRAVENOUS; SUBCUTANEOUS at 08:44

## 2022-05-30 RX ADMIN — Medication 10 ML: at 08:45

## 2022-05-30 RX ADMIN — HEPARIN SODIUM 8170 UNITS: 1000 INJECTION INTRAVENOUS; SUBCUTANEOUS at 11:00

## 2022-05-30 RX ADMIN — INSULIN LISPRO 2 UNITS: 100 INJECTION, SOLUTION INTRAVENOUS; SUBCUTANEOUS at 17:37

## 2022-05-30 RX ADMIN — LISINOPRIL 10 MG: 10 TABLET ORAL at 08:44

## 2022-05-30 RX ADMIN — DOCUSATE SODIUM 100 MG: 100 CAPSULE, LIQUID FILLED ORAL at 21:11

## 2022-05-30 RX ADMIN — DOCUSATE SODIUM 100 MG: 100 CAPSULE, LIQUID FILLED ORAL at 08:44

## 2022-05-30 RX ADMIN — Medication 21 UNITS/KG/HR: at 08:40

## 2022-05-30 RX ADMIN — Medication 10 ML: at 21:12

## 2022-05-30 ASSESSMENT — PAIN SCALES - GENERAL
PAINLEVEL_OUTOF10: 0
PAINLEVEL_OUTOF10: 0

## 2022-05-30 NOTE — PROGRESS NOTES
[facility]        Progress Note    Pt Name: Marylen Manger  MRN: [id number]  YOB: 1950  Date of evaluation: 2022    Chief Complaint: abdominal pain      Subjective:    No acute events overnight  Nausea resolved, hungry  Passing stool  Pain minimal    Vital Signs:  Patient Vitals for the past 24 hrs:   BP Temp Temp src Pulse Resp SpO2 Weight   22 0842 139/78 98 °F (36.7 °C) Temporal 87 16 94 % --   22 0416 126/77 98 °F (36.7 °C) Temporal 88 16 93 % 217 lb 13 oz (98.8 kg)   22 0030 -- 97.6 °F (36.4 °C) Temporal -- 16 94 % --   22 2034 (!) 140/81 97.8 °F (36.6 °C) Temporal 90 16 95 % --   22 1642 137/71 97.8 °F (36.6 °C) Temporal 94 16 93 % --   22 1200 110/84 97.7 °F (36.5 °C) Temporal 89 24 95 % --      TEMPERATURE HISTORY 24H: Temp (24hrs), Av.8 °F (36.6 °C), Min:97.6 °F (36.4 °C), Max:98 °F (36.7 °C)    BLOOD PRESSURE HISTORY: Systolic (43YDQ), JSW:871 , Min:98 , GINNA:269    Diastolic (06EEC), NN, Min:58, Max:84      Intake/Output:    I/O last 3 completed shifts: In: 656.8 [P.O.:360; I.V.:296.8]  Out: 1050 [Urine:1050]  I/O this shift:  In: 180 [P.O.:180]  Out: -   Drain/tube Output:           Physical Exam:  General: awake, alert, oriented to  person, place, time  Cardiac: regular rate and rhythm   Pulmonary: clear to auscultation bilaterally   Abdomen: soft, mildly distended, non-tender    Labs:  CBC:    Recent Labs     22  0315 22  0142 22  0213   WBC 9.1 9.2 9.2   HGB 13.0* 12.9* 12.9*   HCT 38.9* 38.6* 39.0*    206 230     BMP:    Recent Labs     22  03122  0142 22  0213    137 134*   K 3.8  3.8 3.8 3.8   CL 99 97* 94*   CO2 27 27 25   BUN 8 10 11   CREATININE 0.7* 0.7* 0.7*   GLUCOSE 171* 152* 161*     Hepatic:   Recent Labs     22   AST 16   ALT 16   BILITOT 0.8   ALKPHOS 39*     Amylase: No results for input(s):  AMYLASE in the last 72 or confirmed emergency medical condition->Emergency Medical Condition (MA) Reason for Exam: abdominal pain, vomiting, fever, 2 days post op prostatectomy FINDINGS: Lower Chest: There is no consolidation or effusion. Organs: The liver, pancreas, spleen, kidneys and adrenals are unremarkable aside from a benign 3 cm left adrenal myelolipoma in several stable indeterminate hypodense hepatic lesions. This finding requires no further imaging evaluation. GI/Bowel: There is mild distension of both large and small bowel with differential air-fluid levels. There is no discrete transition point. Pelvis: The bladder is decompressed by Costa catheter and thus not evaluated. Postsurgical changes of prostatectomy are noted. Peritoneum/Retroperitoneum: A small amount of ascites is present within the right lower quadrant. There is scattered postoperative pneumoperitoneum throughout the abdomen and pelvis. There is no adenopathy. Bones/Soft Tissues: There is no acute fracture or aggressive osseous lesion. Mild to moderate postoperative ileus. RECOMMENDATIONS: Unavailable     CT CHEST PULMONARY EMBOLISM W CONTRAST    Result Date: 5/27/2022  EXAMINATION: CTA OF THE CHEST 5/27/2022 7:46 am TECHNIQUE: CTA of the chest was performed after the administration of intravenous contrast.  Multiplanar reformatted images are provided for review. MIP images are provided for review. Automated exposure control, iterative reconstruction, and/or weight based adjustment of the mA/kV was utilized to reduce the radiation dose to as low as reasonably achievable. COMPARISON: None.  HISTORY: ORDERING SYSTEM PROVIDED HISTORY: post-op fever and borderline hypoxia with tachycardia TECHNOLOGIST PROVIDED HISTORY: Reason for exam:->post-op fever and borderline hypoxia with tachycardia Decision Support Exception - unselect if not a suspected or confirmed emergency medical condition->Emergency Medical Condition (MA) Reason for Exam: post-op fever and borderline hypoxia with tachycardia FINDINGS: Pulmonary Arteries: Pulmonary arteries are adequately opacified for evaluation. There are a few tiny subsegmental occlusive/partially occlusive filling defects within the right lower lobe pulmonary arterial vessels. Main pulmonary artery is normal in caliber. Mediastinum: No evidence of mediastinal lymphadenopathy. The heart and pericardium demonstrate no acute abnormality. There is no acute abnormality of the thoracic aorta. Lungs/pleura: The lungs are without acute process. No focal consolidation or pulmonary edema. No evidence of pleural effusion or pneumothorax. Upper Abdomen: Limited images of the upper abdomen are unremarkable. Soft Tissues/Bones: No acute bone or soft tissue abnormality. Small right lower lobe subsegmental pulmonary emboli. Findings were discussed with Alyssa Cartagena at 8:13 am on 5/27/2022. RECOMMENDATIONS: Unavailable     XR ABDOMEN FOR NG/OG/NE TUBE PLACEMENT    Result Date: 5/27/2022  EXAMINATION: ONE SUPINE XRAY VIEW(S) OF THE ABDOMEN 5/27/2022 8:31 am COMPARISON: None. HISTORY: ORDERING SYSTEM PROVIDED HISTORY: Confirmation of course of NG/OG/NE tube and location of tip of tube TECHNOLOGIST PROVIDED HISTORY: Reason for exam:->Confirmation of course of NG/OG/NE tube and location of tip of tube Portable? ->Yes Reason for Exam: ng placement FINDINGS: Tip of NG tube is seen in the left upper quadrant in the region of the gastric fundus Gaseous distention is seen throughout the abdomen, incompletely evaluated.      Tip of NG tube projects in region of gastric fundus Scattered gaseous distention throughout the abdomen, incompletely evaluated       Scheduled Meds:   docusate sodium  100 mg Oral BID    polyethylene glycol  17 g Oral Daily    lisinopril  10 mg Oral Daily    sodium chloride flush  5-40 mL IntraVENous 2 times per day    insulin lispro  0-12 Units SubCUTAneous TID WC    insulin lispro  0-6 Units SubCUTAneous Nightly Continuous Infusions:   heparin (PORCINE) Infusion 21 Units/kg/hr (05/30/22 0840)    sodium chloride 10 mL/hr at 05/29/22 1953    dextrose       PRN Meds:. HYDROmorphone, heparin (porcine), heparin (porcine), sodium chloride flush, sodium chloride, ondansetron **OR** ondansetron, polyethylene glycol, acetaminophen **OR** acetaminophen, phenol, dextrose bolus **OR** dextrose bolus, glucagon (rDNA), dextrose      Assessment:  Patient Active Problem List   Diagnosis    Pure hypercholesterolemia    HTN (hypertension)    Type 2 diabetes mellitus without complication, without long-term current use of insulin (HCC)    Prostate cancer ([facility] [address]    Acute pulmonary embolism (HCC)     Postoperative ileus  Acute pulmonary embolism  Diabetes     Plan:  1. Clinically improved from post-operative ileus  2. Advance to regular diet  3. Passing stool  4. Pain minimal  5. May anticoagulate with heparin gtt from surgical standpoint, PO anticoagulation at discharge  6. Defer management of remainder of medical comorbidities to primary and consulting teams  7. Discharge planning, may discharge today from surgical standpoint if tolerates diet and follow as needed electively    Joselito Durán MD, FACS  5/30/2022  9:56 AM

## 2022-05-30 NOTE — PROGRESS NOTES
[facility]    5/30/2022 11:37 AM        Name: Marisela Vera .               Admitted: 5/27/2022  Primary Care Provider: Niall Narayan MD (Tel: [phone])        Subjective:    Sitting in chair feeling much better no nausea vomiting no chest pain no fevers or chills    Reviewed interval ancillary notes    Current Medications  docusate sodium (COLACE) capsule 100 mg, BID  polyethylene glycol (GLYCOLAX) packet 17 g, Daily  HYDROmorphone HCl PF (DILAUDID) injection 0.5 mg, Q1H PRN  heparin (porcine) injection 8,170 Units, PRN  heparin (porcine) injection 4,080 Units, PRN  heparin 25,000 units in dextrose 5% 250 mL (premix) infusion, Continuous  lisinopril (PRINIVIL;ZESTRIL) tablet 10 mg, Daily  sodium chloride flush 0.9 % injection 5-40 mL, 2 times per day  sodium chloride flush 0.9 % injection 5-40 mL, PRN  0.9 % sodium chloride infusion, PRN  ondansetron (ZOFRAN-ODT) disintegrating tablet 4 mg, Q8H PRN   Or  ondansetron (ZOFRAN) injection 4 mg, Q6H PRN  polyethylene glycol (GLYCOLAX) packet 17 g, Daily PRN  acetaminophen (TYLENOL) tablet 650 mg, Q6H PRN   Or  acetaminophen (TYLENOL) suppository 650 mg, Q6H PRN  insulin lispro (HUMALOG) injection vial 0-12 Units, TID WC  insulin lispro (HUMALOG) injection vial 0-6 Units, Nightly  phenol 1.4 % mouth spray 1 spray, Q2H PRN  dextrose bolus 10% 125 mL, PRN   Or  dextrose bolus 10% 250 mL, PRN  glucagon (rDNA) injection 1 mg, PRN  dextrose 5 % solution, PRN        Objective:  /78   Pulse 87   Temp 98 °F (36.7 °C) (Temporal)   Resp 16   Ht 6' (1.829 m)   Wt 217 lb 13 oz (98.8 kg)   SpO2 94%   BMI 29.54 kg/m²     Intake/Output Summary (Last 24 hours) at 5/30/2022 1137  Last data filed at 5/30/2022 0858  Gross per 24 hour   Intake 540 ml   Output 500 ml   Net 40 ml      Wt Readings from Last 3 Encounters:   05/30/22 217 lb 13 oz (98.8 kg)   05/25/22 227 lb (103 kg)   05/20/22 232 lb (105.2 kg)       General appearance:  Appears comfortable. AAOx3  HEENT: atraumatic, Pupils equal, muscous membranes moist, no masses appreciated  Cardiovascular: Regular rate and rhythm no murmurs appreciated  Respiratory: CTAB no wheezing  Gastrointestinal: Abdomen soft, non-tender, BS+  EXT: no edema  Neurology: no gross focal deficts  Psychiatry: Appropriate affect. Not agitated  Skin: Warm, dry, no rashes appreciated    Labs and Tests:  CBC:   Recent Labs     05/28/22 0315 05/29/22 0142 05/30/22 0213   WBC 9.1 9.2 9.2   HGB 13.0* 12.9* 12.9*    206 230     BMP:    Recent Labs     05/28/22 0315 05/29/22 0142 05/30/22 0213    137 134*   K 3.8  3.8 3.8 3.8   CL 99 97* 94*   CO2 27 27 25   BUN 8 10 11   CREATININE 0.7* 0.7* 0.7*   GLUCOSE 171* 152* 161*     Hepatic:   Recent Labs     05/28/22 0315   AST 16   ALT 16   BILITOT 0.8   ALKPHOS 39*     XR CHEST PORTABLE   Final Result   Stable chronic changes with no acute abnormality seen. XR ABDOMEN FOR NG/OG/NE TUBE PLACEMENT   Final Result   Tip of NG tube projects in region of gastric fundus      Scattered gaseous distention throughout the abdomen, incompletely evaluated         CT ABDOMEN PELVIS W IV CONTRAST Additional Contrast? None   Final Result   Mild to moderate postoperative ileus. RECOMMENDATIONS:   Unavailable         CT CHEST PULMONARY EMBOLISM W CONTRAST   Final Result   Small right lower lobe subsegmental pulmonary emboli. Findings were discussed with Consuelo Vitale at 8:13 am on 5/27/2022. RECOMMENDATIONS:   Unavailable             Recent imaging reviewed    Problem List  Principal Problem:    Acute pulmonary embolism ([address]  Resolved Problems:    * No resolved hospital problems.  *          Assessment/Plan:   Acute pulmonary embolism  - heparin gtt     Ileus: advance to reg diet monitor today continue pt ot get patient out of bed and walking     Dm2; SSI     DVT prophylaxis heparin gtt  Code status full code       Sallie Lewis MD   5/30/2022 11:37 AM

## 2022-05-30 NOTE — PROGRESS NOTES
PM assessment complete and documented. Meds given per MAR. Costa patent and secure. Draining without difficulty. 4 lap sites well approximated with no drainage noted. Surgical glue in place. No needs or concerns expressed. Will continue to monitor.

## 2022-05-30 NOTE — PROGRESS NOTES
No changes noted in assessment. Pt sleeping between care. Costa secure and draining without difficulty. Costa care complete. Denies needs or concerns at present. Will continue to monitor.

## 2022-05-30 NOTE — PROGRESS NOTES
Assessment completed and documented. Denies pain/SOB. Up to BR with stand by assist.  Tolerated well. AM meds given and pt up to chair. Will continue to monitor.

## 2022-05-30 NOTE — PROGRESS NOTES
gu note    vss afeb    On reg diet per gen surg    impr    Stable    recc    Cont simpson   Can dc per gu with simpson to leg bag  Fu dr casarez this week  anticoag per medicine for pe

## 2022-05-31 VITALS
DIASTOLIC BLOOD PRESSURE: 78 MMHG | HEART RATE: 88 BPM | SYSTOLIC BLOOD PRESSURE: 117 MMHG | TEMPERATURE: 96.9 F | HEIGHT: 72 IN | BODY MASS INDEX: 30.34 KG/M2 | WEIGHT: 223.99 LBS | OXYGEN SATURATION: 98 % | RESPIRATION RATE: 20 BRPM

## 2022-05-31 LAB
ANION GAP SERPL CALCULATED.3IONS-SCNC: 14 MMOL/L (ref 3–16)
APTT: 111.1 SEC (ref 23–34.3)
APTT: 86.9 SEC (ref 23–34.3)
APTT: 93.2 SEC (ref 23–34.3)
BASOPHILS ABSOLUTE: 0.1 K/UL (ref 0–0.2)
BASOPHILS RELATIVE PERCENT: 0.8 %
BLOOD CULTURE, ROUTINE: NORMAL
BUN BLDV-MCNC: 10 MG/DL (ref 7–20)
CALCIUM SERPL-MCNC: 8.4 MG/DL (ref 8.3–10.6)
CHLORIDE BLD-SCNC: 98 MMOL/L (ref 99–110)
CO2: 23 MMOL/L (ref 21–32)
CREAT SERPL-MCNC: 0.6 MG/DL (ref 0.8–1.3)
CULTURE, BLOOD 2: NORMAL
EOSINOPHILS ABSOLUTE: 1 K/UL (ref 0–0.6)
EOSINOPHILS RELATIVE PERCENT: 11 %
GFR AFRICAN AMERICAN: >60
GFR NON-AFRICAN AMERICAN: >60
GLUCOSE BLD-MCNC: 128 MG/DL (ref 70–99)
GLUCOSE BLD-MCNC: 153 MG/DL (ref 70–99)
GLUCOSE BLD-MCNC: 159 MG/DL (ref 70–99)
HCT VFR BLD CALC: 39 % (ref 40.5–52.5)
HEMOGLOBIN: 12.8 G/DL (ref 13.5–17.5)
LYMPHOCYTES ABSOLUTE: 2 K/UL (ref 1–5.1)
LYMPHOCYTES RELATIVE PERCENT: 22.6 %
MCH RBC QN AUTO: 30.1 PG (ref 26–34)
MCHC RBC AUTO-ENTMCNC: 32.9 G/DL (ref 31–36)
MCV RBC AUTO: 91.6 FL (ref 80–100)
MONOCYTES ABSOLUTE: 1 K/UL (ref 0–1.3)
MONOCYTES RELATIVE PERCENT: 12 %
NEUTROPHILS ABSOLUTE: 4.7 K/UL (ref 1.7–7.7)
NEUTROPHILS RELATIVE PERCENT: 53.6 %
PDW BLD-RTO: 13.3 % (ref 12.4–15.4)
PERFORMED ON: ABNORMAL
PERFORMED ON: ABNORMAL
PLATELET # BLD: 247 K/UL (ref 135–450)
PMV BLD AUTO: 9.1 FL (ref 5–10.5)
POTASSIUM REFLEX MAGNESIUM: 3.7 MMOL/L (ref 3.5–5.1)
RBC # BLD: 4.26 M/UL (ref 4.2–5.9)
SODIUM BLD-SCNC: 135 MMOL/L (ref 136–145)
WBC # BLD: 8.7 K/UL (ref 4–11)

## 2022-05-31 PROCEDURE — 2580000003 HC RX 258: Performed by: INTERNAL MEDICINE

## 2022-05-31 PROCEDURE — 85025 COMPLETE CBC W/AUTO DIFF WBC: CPT

## 2022-05-31 PROCEDURE — 85730 THROMBOPLASTIN TIME PARTIAL: CPT

## 2022-05-31 PROCEDURE — 97530 THERAPEUTIC ACTIVITIES: CPT

## 2022-05-31 PROCEDURE — 97116 GAIT TRAINING THERAPY: CPT

## 2022-05-31 PROCEDURE — APPSS15 APP SPLIT SHARED TIME 0-15 MINUTES: Performed by: NURSE PRACTITIONER

## 2022-05-31 PROCEDURE — 80048 BASIC METABOLIC PNL TOTAL CA: CPT

## 2022-05-31 PROCEDURE — 6370000000 HC RX 637 (ALT 250 FOR IP): Performed by: INTERNAL MEDICINE

## 2022-05-31 PROCEDURE — 99232 SBSQ HOSP IP/OBS MODERATE 35: CPT | Performed by: SURGERY

## 2022-05-31 PROCEDURE — 6370000000 HC RX 637 (ALT 250 FOR IP): Performed by: SURGERY

## 2022-05-31 PROCEDURE — APPNB30 APP NON BILLABLE TIME 0-30 MINS: Performed by: NURSE PRACTITIONER

## 2022-05-31 PROCEDURE — 36415 COLL VENOUS BLD VENIPUNCTURE: CPT

## 2022-05-31 RX ADMIN — DOCUSATE SODIUM 100 MG: 100 CAPSULE, LIQUID FILLED ORAL at 10:06

## 2022-05-31 RX ADMIN — LISINOPRIL 10 MG: 10 TABLET ORAL at 10:06

## 2022-05-31 RX ADMIN — POLYETHYLENE GLYCOL 3350 17 G: 17 POWDER, FOR SOLUTION ORAL at 10:06

## 2022-05-31 RX ADMIN — Medication 10 ML: at 10:14

## 2022-05-31 RX ADMIN — INSULIN LISPRO 2 UNITS: 100 INJECTION, SOLUTION INTRAVENOUS; SUBCUTANEOUS at 10:11

## 2022-05-31 RX ADMIN — APIXABAN 10 MG: 5 TABLET, FILM COATED ORAL at 10:15

## 2022-05-31 ASSESSMENT — PAIN SCALES - GENERAL
PAINLEVEL_OUTOF10: 0
PAINLEVEL_OUTOF10: 0

## 2022-05-31 NOTE — PROGRESS NOTES
[facility]        Progress Note    Pt Name: Alexus Aparicio  MRN: [id number]  YOB: 1950  Date of evaluation: 2022    Chief Complaint: Abdominal pain      Subjective:    No acute events overnight  No nausea or vomiting, tolerating regular diet  Passing stool  Denies abdominal pain  Resting in bed at this time      Vital Signs:  Patient Vitals for the past 24 hrs:   BP Temp Temp src Pulse Resp SpO2 Weight   22 0819 -- -- -- 88 -- -- --   22 0818 -- -- -- 89 -- -- --   22 0817 -- -- -- 88 -- -- --   22 0816 -- -- -- 88 -- -- --   22 0815 -- -- -- 89 -- -- --   22 0814 -- -- -- 89 -- -- --   22 0800 117/78 96.9 °F (36.1 °C) Temporal -- 20 98 % --   22 0453 128/78 97.4 °F (36.3 °C) Temporal 84 16 94 % 223 lb 15.8 oz (101.6 kg)   22 0009 129/71 97.9 °F (36.6 °C) Temporal 85 16 96 % --   22 2052 129/74 98.2 °F (36.8 °C) Temporal 98 16 99 % --   22 1749 111/62 97.4 °F (36.3 °C) Temporal 97 16 94 % --   22 1154 123/70 97.9 °F (36.6 °C) Temporal 88 16 96 % --   22 1059 -- -- -- 90 16 96 % --      TEMPERATURE HISTORY 24H: Temp (24hrs), Av.6 °F (36.4 °C), Min:96.9 °F (36.1 °C), Max:98.2 °F (36.8 °C)    BLOOD PRESSURE HISTORY: Systolic (77NGL), SAP:982 , Min:111 , FKE:323    Diastolic (49VVJ), QSH:05, Min:62, Max:78      Intake/Output:    I/O last 3 completed shifts:   In: 1067.8 [P.O.:180; I.V.:887.8]  Out: 1400 [Urine:1400]  I/O this shift:  In: 240 [P.O.:240]  Out: -   Drain/tube Output:           Physical Exam:  General: awake, alert, oriented to person, place, time  Cardiac: regular rate and rhythm   Pulmonary: clear to auscultation bilaterally   Abdomen: soft, non-distended, non-tender, bowel sounds active    Labs:  CBC:    Recent Labs     22  0142 22  0213 22  0432   WBC 9.2 9.2 8.7   HGB 12.9* 12.9* 12.8*   HCT 38.6* 39.0* 39.0*    230 247     BMP: Recent Labs     05/29/22  0142 05/30/22  0213 05/31/22  0432    134* 135*   K 3.8 3.8 3.7   CL 97* 94* 98*   CO2 27 25 23   BUN 10 11 10   CREATININE 0.7* 0.7* 0.6*   GLUCOSE 152* 161* 159*     Hepatic:   No results for input(s): AST, ALT, ALB, BILITOT, ALKPHOS in the last 72 hours. Amylase: No results for input(s): AMYLASE in the last 72 hours. Lipase: No results for input(s): LIPASE in the last 72 hours. Mag:      No results for input(s): MG in the last 72 hours. Phos:     No results for input(s): PHOS in the last 72 hours. Coags:   Recent Labs     05/31/22  0107 05/31/22  0432 05/31/22  0704   APTT 86.9* 93.2* 111.1*       Cultures:  Relevant cultures documented under results section     Pathology:   No relevant pathology     Imaging:  I have personally reviewed the following films:    No results found. Scheduled Meds:   apixaban  10 mg Oral BID    Followed by   Nazia Tapia ON 6/7/2022] apixaban  5 mg Oral BID    docusate sodium  100 mg Oral BID    polyethylene glycol  17 g Oral Daily    lisinopril  10 mg Oral Daily    sodium chloride flush  5-40 mL IntraVENous 2 times per day    insulin lispro  0-12 Units SubCUTAneous TID WC    insulin lispro  0-6 Units SubCUTAneous Nightly     Continuous Infusions:   sodium chloride 10 mL/hr at 05/30/22 1557    dextrose       PRN Meds:. HYDROmorphone, sodium chloride flush, sodium chloride, ondansetron **OR** ondansetron, polyethylene glycol, acetaminophen **OR** acetaminophen, phenol, dextrose bolus **OR** dextrose bolus, glucagon (rDNA), dextrose      Assessment:  Patient Active Problem List   Diagnosis    Pure hypercholesterolemia    HTN (hypertension)    Type 2 diabetes mellitus without complication, without long-term current use of insulin (HCC)    Prostate cancer ([facility] [address]    Acute pulmonary embolism (HCC)     Postoperative ileus  Acute pulmonary embolism  Diabetes      Plan:  1.  Symptoms resolved, tolerating regular diet, passing stool; no further imaging or intervention planned at this time  2. Regular diet as tolerated; monitor bowel function  3. Activity as tolerated, ambulate TID, up to chair for all meals  4. PRN analgesics and antiemetics--minimizing narcotics as tolerated  5. Okay to start oral anticoagulation from a surgical perspective  6. Management of medical comorbid etiologies per primary team and consulting services  7. Disposition: Okay to for discharge home from a surgical perspective    EDUCATION:  Educated patient on plan of care and disease process--all questions answered. Denice Mir is stable from surgical standpoint. Will follow as needed. Please call with questions or concerns. Thank you for allowing us to participate in [address] [place] [address]. Plans discussed with patient and nursing. Reviewed and discussed with Dr. Angeles Woodward. Signed:  JUVENCIO Valdivia - CNP  5/31/2022 10:51 AM     I have personally performed a face to face diagnostic evaluation on this patient. I have interviewed and examined the patient and I agree with the assessment above. Time was spent reviewing patient chart (including but not limited to notes, labs, imaging and other testing), interviewing and counseling patient and present family members, performing physical exam, documentation of my findings and subsequent follow up of ordered medication and testing, placing referrals and communication with patient care providers, coordinating future care as well as documentation in the EHR. This encompassed more than 50% of the total encounter time. In summary, my findings and plan are the following:   Mr. Aleyda Abbott is a 80 y.o. male who presents with   Postoperative ileus  Acute pulmonary embolism  Diabetes     Plan:  1. Clinically improved from post-operative ileus  2. Tolerating diet  3. Passing stool  4. Pain minimal  5. May anticoagulate with PO medication from surgical standpoint  6.  Defer management of remainder of medical comorbidities to primary and consulting teams  7. Discharge planning, may discharge today from surgical standpoint when medically stable, will sign off, please call with questions and follow electively after discharge as needed    [address] Samuel MD, FACS  5/31/2022  10:55 AM

## 2022-05-31 NOTE — PROGRESS NOTES
PT was discharged home with family and all her personal belongings. Discharged education was given and patient verbalized understanding to the teaching. He was discharged home with 20fr. Of Costa intact per Urology order.

## 2022-05-31 NOTE — PROGRESS NOTES
No changes noted in assessment. Per pt, simpson leaks some when coughs. Care completed with simpson wipes. Sleeping between care. Denies needs at present. Will continue to monitor.

## 2022-05-31 NOTE — PLAN OF CARE
Problem: Discharge Planning  Goal: Discharge to home or other facility with appropriate resources  Outcome: Completed     Problem: Pain  Goal: Verbalizes/displays adequate comfort level or baseline comfort level  Outcome: Completed     Problem: Safety - Adult  Goal: Free from fall injury  Outcome: Completed     Problem: Skin/Tissue Integrity  Goal: Absence of new skin breakdown  Description: 1. Monitor for areas of redness and/or skin breakdown  2. Assess vascular access sites hourly  3. Every 4-6 hours minimum:  Change oxygen saturation probe site  4. Every 4-6 hours:  If on nasal continuous positive airway pressure, respiratory therapy assess nares and determine need for appliance change or resting period.   Outcome: Completed     Problem: Chronic Conditions and Co-morbidities  Goal: Patient's chronic conditions and co-morbidity symptoms are monitored and maintained or improved  Outcome: Completed

## 2022-05-31 NOTE — CARE COORDINATION
Discharge Planning. Patient admitted with an anticipated short hospitalization length of stay. Chart reviewed and it appears that patient has minimal needs for discharge at this time. Discussed with patients nurse and requested that case management be notified if discharge needs are identified. PCP:Everardo Gutiérrez MD    Insurance: Medicare    Case management will continue to follow progress and update discharge plan as needed.       Electronically signed by AFTAB Álvarez on 5/31/2022 at 8:47 AM

## 2022-05-31 NOTE — DISCHARGE INSTR - COC
CASE MANAGEMENT/SOCIAL WORK SECTION    Inpatient Status Date: ***    Readmission Risk Assessment Score:  Readmission Risk              Risk of Unplanned Readmission:  13           Discharging to Facility/ Agency   Name:   Address:  Phone:  Fax:    Dialysis Facility (if applicable)   Name:  Address:  Dialysis Schedule:  Phone:  Fax:    / signature: {Esignature:[id number]}    PHYSICIAN SECTION    Prognosis: {Prognosis:3164487601}    Condition at Discharge: [address] [place] [address] Patient Condition:125594893}    Rehab Potential (if transferring to Rehab): {Prognosis:3723548425}    Recommended Labs or Other Treatments After Discharge: ***    Physician Certification: I certify the above information and transfer of Jolynn Ulloa  is necessary for the continuing treatment of the diagnosis listed and that he requires {Admit to Appropriate Level of Care:12228} for {GREATER/LESS:962430318} 30 days.      Update Admission H&P: {CHP DME Changes in FGQYS:962060186}    PHYSICIAN SIGNATURE:  {Esignature:[id number]}

## 2022-05-31 NOTE — PROGRESS NOTES
PM assessment complete and documented. Meds given per MAR. Pt sitting up in chair. Assisted to bathroom, then pt wanted to go to bed. Costa secure and draining without difficulty. No needs or concerns expressed. Will continue to monitor.

## 2022-05-31 NOTE — DISCHARGE SUMMARY
Hospital Medicine Discharge Summary    Patient: Marisela Vera     Gender: male  : 1950   Age: 80 y.o. MRN: [id number]    Admitting Physician: Shaila Cole MD  Discharge Physician: Shaila Cole MD     Code Status: Full Code     Admit Date: 2022   Discharge Date:   2022    Disposition:  Home  Time spent arranging discharge: 35 minutes    Discharge Diagnoses: Active Hospital Problems    Diagnosis Date Noted    Acute pulmonary embolism ([address] [I26.99] 2022     Priority: Medium   post op ileus        Condition at Discharge:  [facility] Course:   Patient admitted to hospital with acute pulmonary embolism provoked after postop start on heparin drip discharged on Eliquis for least 3 months. Patient did have postop ileus was resolved was tolerating diet and discharged home with follow-up with urology as outpatient PCP    Discharge Exam:    /78   Pulse 88   Temp 96.9 °F (36.1 °C) (Temporal)   Resp 20   Ht 6' (1.829 m)   Wt 223 lb 15.8 oz (101.6 kg)   SpO2 94%   BMI 30.38 kg/m²   General appearance:  Appears comfortable. AAOx3  HEENT: atraumatic, Pupils equal, muscous membranes moist, no masses appreciated  Cardiovascular: Regular rate and rhythm no murmurs appreciated  Respiratory: CTAB no wheezing  Gastrointestinal: Abdomen soft, non-tender, BS+  EXT: no edema  Neurology: no gross focal deficts  Psychiatry: Appropriate affect. Not agitated  Skin: Warm, dry, no rashes appreciated    Discharge Medications:   Current Discharge Medication List      START taking these medications    Details   !! apixaban (ELIQUIS) 5 MG TABS tablet Take 2 tablets by mouth 2 times daily for 14 doses  Qty: 28 tablet, Refills: 0      !! apixaban (ELIQUIS) 5 MG TABS tablet Take 1 tablet by mouth 2 times daily Start taking after 10mg bid x 7 days completed  Qty: 60 tablet, Refills: 0       !! - Potential duplicate medications found. Please discuss with provider.         Current Discharge Medication List        Current Discharge Medication List      CONTINUE these medications which have NOT CHANGED    Details   senna-docusate (PERICOLACE) 8.6-50 MG per tablet Take 1 tablet by mouth 2 times daily For constipation while on pain medication. Qty: 30 tablet, Refills: 1      metFORMIN (GLUCOPHAGE-XR) 500 MG extended release tablet TAKE 2 TABLETS BY MOUTH DAILY (WITH BREAKFAST)  Qty: 180 tablet, Refills: 2    Associated Diagnoses: Type 2 diabetes mellitus without complication, without long-term current use of insulin (HCC)      lisinopril (PRINIVIL;ZESTRIL) 10 MG tablet TAKE 1 TABLET BY MOUTH EVERY DAY  Qty: 90 tablet, Refills: 3      simvastatin (ZOCOR) 40 MG tablet TAKE 1 TABLET BY MOUTH EVERY DAY EVERY NIGHT  Qty: 90 tablet, Refills: 3      Multiple Vitamins-Minerals (THERAPEUTIC MULTIVITAMIN-MINERALS) tablet Take 1 tablet by mouth daily      Cholecalciferol (VITAMIN D3) 1000 units TABS Take 1 tablet by mouth daily  Qty: 30 tablet, Refills: 0           Current Discharge Medication List      STOP taking these medications       HYDROcodone-acetaminophen (NORCO) 5-325 MG per tablet Comments:   Reason for Stopping:               Labs:  For convenience and continuity at follow-up the following most recent labs are provided:    Lab Results   Component Value Date    WBC 8.7 05/31/2022    HGB 12.8 05/31/2022    HCT 39.0 05/31/2022    MCV 91.6 05/31/2022     05/31/2022     05/31/2022    K 3.7 05/31/2022    CL 98 05/31/2022    CO2 23 05/31/2022    BUN 10 05/31/2022    CREATININE 0.6 05/31/2022    CALCIUM 8.4 05/31/2022    PHOS 2.2 05/28/2022    ALKPHOS 39 05/28/2022    ALT 16 05/28/2022    AST 16 05/28/2022    BILITOT 0.8 05/28/2022    LABALBU 3.3 05/28/2022    LDLCALC 62 02/12/2022    TRIG 97 04/18/2019     Lab Results   Component Value Date    INR 1.09 05/28/2022    INR 0.94 05/11/2022       Radiology:  XR CHEST (2 VW)    Result Date: 5/11/2022  EXAMINATION: TWO XRAY VIEWS OF THE CHEST thus not evaluated. Postsurgical changes of prostatectomy are noted. Peritoneum/Retroperitoneum: A small amount of ascites is present within the right lower quadrant. There is scattered postoperative pneumoperitoneum throughout the abdomen and pelvis. There is no adenopathy. Bones/Soft Tissues: There is no acute fracture or aggressive osseous lesion. Mild to moderate postoperative ileus. RECOMMENDATIONS: Unavailable     XR CHEST PORTABLE    Result Date: 5/28/2022  EXAMINATION: ONE XRAY VIEW OF THE CHEST 5/28/2022 3:28 pm COMPARISON: 05/11/2022 HISTORY: ORDERING SYSTEM PROVIDED HISTORY: persistent cough TECHNOLOGIST PROVIDED HISTORY: Reason for exam:->persistent cough Reason for Exam: persistent cough FINDINGS: The heart is normal.  The pulmonary vessels are normal.  The lungs are mildly hyperinflated. No consolidation or effusion is seen. The bones are intact. Stable chronic changes with no acute abnormality seen. CT CHEST PULMONARY EMBOLISM W CONTRAST    Result Date: 5/27/2022  EXAMINATION: CTA OF THE CHEST 5/27/2022 7:46 am TECHNIQUE: CTA of the chest was performed after the administration of intravenous contrast.  Multiplanar reformatted images are provided for review. MIP images are provided for review. Automated exposure control, iterative reconstruction, and/or weight based adjustment of the mA/kV was utilized to reduce the radiation dose to as low as reasonably achievable. COMPARISON: None. HISTORY: ORDERING SYSTEM PROVIDED HISTORY: post-op fever and borderline hypoxia with tachycardia TECHNOLOGIST PROVIDED HISTORY: Reason for exam:->post-op fever and borderline hypoxia with tachycardia Decision Support Exception - unselect if not a suspected or confirmed emergency medical condition->Emergency Medical Condition (MA) Reason for Exam: post-op fever and borderline hypoxia with tachycardia FINDINGS: Pulmonary Arteries: Pulmonary arteries are adequately opacified for evaluation.   There are a few tiny subsegmental occlusive/partially occlusive filling defects within the right lower lobe pulmonary arterial vessels. Main pulmonary artery is normal in caliber. Mediastinum: No evidence of mediastinal lymphadenopathy. The heart and pericardium demonstrate no acute abnormality. There is no acute abnormality of the thoracic aorta. Lungs/pleura: The lungs are without acute process. No focal consolidation or pulmonary edema. No evidence of pleural effusion or pneumothorax. Upper Abdomen: Limited images of the upper abdomen are unremarkable. Soft Tissues/Bones: No acute bone or soft tissue abnormality. Small right lower lobe subsegmental pulmonary emboli. Findings were discussed with Fay Min at 8:13 am on 5/27/2022. RECOMMENDATIONS: Unavailable     XR ABDOMEN FOR NG/OG/NE TUBE PLACEMENT    Result Date: 5/27/2022  EXAMINATION: ONE SUPINE XRAY VIEW(S) OF THE ABDOMEN 5/27/2022 8:31 am COMPARISON: None. HISTORY: ORDERING SYSTEM PROVIDED HISTORY: Confirmation of course of NG/OG/NE tube and location of tip of tube TECHNOLOGIST PROVIDED HISTORY: Reason for exam:->Confirmation of course of NG/OG/NE tube and location of tip of tube Portable? ->Yes Reason for Exam: ng placement FINDINGS: Tip of NG tube is seen in the left upper quadrant in the region of the gastric fundus Gaseous distention is seen throughout the abdomen, incompletely evaluated.      Tip of NG tube projects in region of gastric fundus Scattered gaseous distention throughout the abdomen, incompletely evaluated         Signed:    Zay Valdes MD   5/31/2022

## 2022-06-01 ENCOUNTER — TELEPHONE (OUTPATIENT)
Dept: FAMILY MEDICINE CLINIC | Age: 72
End: 2022-06-01

## 2022-06-01 ENCOUNTER — CARE COORDINATION (OUTPATIENT)
Dept: CASE MANAGEMENT | Age: 72
End: 2022-06-01

## 2022-06-01 DIAGNOSIS — I26.99 ACUTE PULMONARY EMBOLISM, UNSPECIFIED PULMONARY EMBOLISM TYPE, UNSPECIFIED WHETHER ACUTE COR PULMONALE PRESENT (HCC): Primary | ICD-10-CM

## 2022-06-01 LAB — MISCELLANEOUS LAB TEST ORDER: NORMAL

## 2022-06-01 PROCEDURE — 1111F DSCHRG MED/CURRENT MED MERGE: CPT | Performed by: FAMILY MEDICINE

## 2022-06-01 NOTE — CARE COORDINATION
[facility] Transitions Initial Follow Up Call    Call within 2 business days of discharge: Yes    Patient: Stacie Kenny Patient : 1950   MRN: [id number]  Reason for Admission:   Discharge Date: 22 RARS: Readmission Risk Score: 11 ( )      Last Discharge [facility]       Complaint Diagnosis Description Type Department Provider    22 Post-op Problem; Nausea; Emesis; Abdominal Pain Postoperative ileus ([address] . .. ED to Hosp-Admission (Discharged) (ADMITTED) Светлана Chaudhry MD; [employer] Spoke with: Stacie Kenny    Non-face-to-face services provided:  Obtained and reviewed discharge summary and/or continuity of care documents  [id number] completed     Transitions of Care Initial Call    Was this an external facility discharge? No Discharge Facility:     Challenges to be reviewed by the provider   Additional needs identified to be addressed with provider: No  none             Method of communication with provider : none    Advance Care Planning:   Does patient have an Advance Directive: reviewed and current. Care Transition Nurse contacted the patient by telephone to perform post hospital discharge assessment. Verified name and  with patient as identifiers. Provided introduction to self, and explanation of the CTN role. CTN reviewed discharge instructions, medical action plan and red flags with patient who verbalized understanding. Patient given an opportunity to ask questions and does not have any further questions or concerns at this time. Were discharge instructions available to patient? Yes. Reviewed appropriate site of care based on symptoms and resources available to patient including: When to call 911. The patient agrees to contact the PCP office for questions related to their healthcare. Medication reconciliation was performed with patient, who verbalizes understanding of administration of home medications.  Advised obtaining a 90-day supply of all daily and as-needed medications. Was patient discharged with a pulse oximeter? no     Pt states doing well, no issues or concerns. Denies SOB, CP, fever, abd discomfort. Agreed to more CTC f/u calls. CTN provided contact information. Plan for follow-up call in 5-7 days based on severity of symptoms and risk factors.   Plan for next call: self management-·acute pulmonary embolism; post op ileus, f/u appts       Care Transitions 24 Hour Call    Do you have a copy of your discharge instructions?: Yes  Do you have all of your prescriptions and are they filled?: Yes  Have you been contacted by a [address]?: No  Have you scheduled your follow up appointment?: Yes  How are you going to get to your appointment?: Car - family or friend to transport  Do you have support at home?: Partner/Spouse/SO  Do you feel like you have everything you need to keep you well at home?: Yes  Are you an active caregiver in your home?: No  Care Transitions Interventions  No Identified Needs         Follow Up  Future Appointments   Date Time Provider Meghan Morales   8/19/2022  3:15 PM MD Aby De Jesus RN

## 2022-06-01 NOTE — TELEPHONE ENCOUNTER
----- Message from Oumou Meza sent at 6/1/2022  2:17 PM EDT -----  Subject: Hospital Follow Up    QUESTIONS  What hospital was the Patient Discharged from? [facility]  Date of Discharge? 2022-05-31  Discharge Location? Home  Reason for hospitalization as patient stated? Prostate removal post op   complications. 7- 10 days. What question does the patient have, if applicable?   ---------------------------------------------------------------------------  --------------  CALL BACK INFO  What is the best way for the office to contact you? OK to leave message on   voicemail  Preferred Call Back Phone Number? [phone]  ---------------------------------------------------------------------------  --------------  SCRIPT ANSWERS  Relationship to Patient? Self  (Has the patient been discharged from the hospital within 2 business days   AND does not have a Telephone Encounter  Follow Up From [address] [place] [address]   documented in [address] [place] [address] Rd?)?  Yes

## 2022-06-08 ENCOUNTER — CARE COORDINATION (OUTPATIENT)
Dept: CASE MANAGEMENT | Age: 72
End: 2022-06-08

## 2022-06-08 NOTE — CARE COORDINATION
[facility] Transitions Follow Up Call    2022    Patient: Susan Rapp  Patient : 1950   MRN: [id number]  Reason for Admission: N/V, Post op problem  Discharge Date: 22 RARS: Readmission Risk Score: 11 ( )         Spoke with: [facility] Transitions Follow Up Call    Needs to be reviewed by the provider   Additional needs identified to be addressed with provider: No  none             Method of communication with provider : none      LPN Care Coordinator contacted the patient by telephone to follow up after admission on 22. Verified name and  with patient as identifiers. Addressed changes since last contact: medications-Sildenafil  F/u with Uro  Discussed follow-up appointments. If no appointment was previously scheduled, appointment scheduling offered: Yes. Is follow up appointment scheduled within 7 days of discharge? Yes. Advance Care Planning:   Does patient have an Advance Directive: Not on file. LPN CC reviewed discharge instructions, medical action plan and red flags with patient and discussed any barriers to care and/or understanding of plan of care after discharge. Discussed appropriate site of care based on symptoms and resources available to patient including: PCP  Specialist  Urgent care clinics  When to call [address] The patient agrees to contact the PCP office for questions related to their healthcare. Patients top risk factors for readmission: ineffective coping  Interventions to address risk factors: Obtained and reviewed discharge summary and/or continuity of care documents      Non-[facility] follow up appointment(s):     LPN CC provided contact information for future needs. Plan for follow-up call in 5-7 days based on severity of symptoms and risk factors. Plan for next call: follow up appointment-How did it go? This [facility] spoke with pt and pt stated that he is doing well. Patient denied any worsening symptoms.  Denied fever, chills, N/V and any difficulty breathing at this time. Denied chest pain and SOB. Denied difficulty with urination, BMs or appetite. Denied dysuria and hematuria. Pt had a f/u with Uro and it went well. Catheter removed and pt to f/u in 6 weeks for PSA. Pt prescribed sildenafil. Denied any needs and concerns at this time. Advised pt to immediately report any worsening symptoms to the PCP. Patient verbalized understanding and agreed. Fredis Peñaloza LPN, [facility]  PH: [phone]          Care Transitions Subsequent and Final Call    Schedule Follow Up Appointment with PCP: Completed  Subsequent and Final Calls  Do you have any ongoing symptoms?: No  Have your medications changed?: No  Do you have any questions related to your medications?: No  Do you currently have any active services?: No  Do you have any needs or concerns that I can assist you with?: No  Identified Barriers: None  Care Transitions Interventions  No Identified Needs  Other Interventions:            Follow Up  Future Appointments   Date Time Provider Meghan Morales   6/9/2022  4:00 PM JUVENCIO Gale - LETA [facility] Elise  LAN   8/19/2022  3:15 PM Marito Fernandez., MD Zandra Shelton LPN

## 2022-06-09 ENCOUNTER — OFFICE VISIT (OUTPATIENT)
Dept: FAMILY MEDICINE CLINIC | Age: 72
End: 2022-06-09
Payer: MEDICARE

## 2022-06-09 VITALS
TEMPERATURE: 97.3 F | DIASTOLIC BLOOD PRESSURE: 80 MMHG | SYSTOLIC BLOOD PRESSURE: 118 MMHG | BODY MASS INDEX: 29.7 KG/M2 | WEIGHT: 219 LBS

## 2022-06-09 DIAGNOSIS — I26.99 OTHER ACUTE PULMONARY EMBOLISM WITHOUT ACUTE COR PULMONALE (HCC): ICD-10-CM

## 2022-06-09 DIAGNOSIS — C61 PROSTATE CANCER (HCC): ICD-10-CM

## 2022-06-09 DIAGNOSIS — K56.7 ILEUS (HCC): ICD-10-CM

## 2022-06-09 DIAGNOSIS — R05.9 COUGH: ICD-10-CM

## 2022-06-09 DIAGNOSIS — Z09 HOSPITAL DISCHARGE FOLLOW-UP: Primary | ICD-10-CM

## 2022-06-09 PROCEDURE — 99495 TRANSJ CARE MGMT MOD F2F 14D: CPT | Performed by: NURSE PRACTITIONER

## 2022-06-09 PROCEDURE — 1111F DSCHRG MED/CURRENT MED MERGE: CPT | Performed by: NURSE PRACTITIONER

## 2022-06-09 ASSESSMENT — PATIENT HEALTH QUESTIONNAIRE - PHQ9
1. LITTLE INTEREST OR PLEASURE IN DOING THINGS: 0
2. FEELING DOWN, DEPRESSED OR HOPELESS: 0
SUM OF ALL RESPONSES TO PHQ QUESTIONS 1-9: 0
SUM OF ALL RESPONSES TO PHQ9 QUESTIONS 1 & 2: 0
SUM OF ALL RESPONSES TO PHQ QUESTIONS 1-9: 0

## 2022-06-09 NOTE — PROGRESS NOTES
Post-Discharge Transitional Care Follow Up      [facility]   YOB: 1950    Date of Office Visit:  6/9/2022  Date of Hospital Admission: 5/27/22  Date of Hospital Discharge: 5/31/22  Readmission Risk Score (high >=14%. Medium >=10%):Readmission Risk Score: 11 ( )    Care management risk score Rising risk (score 2-5) and Complex Care (Scores >=6): 1     Non face to face  following discharge, date last encounter closed (first attempt may have been earlier): 6/1/2022  2:41 PM     Call initiated 2 business days of discharge: Yes     Hospital discharge follow-up  Doing well since hospital discharge. -     MO DISCHARGE MEDS RECONCILED W/ CURRENT OUTPATIENT MED LIST  Ileus ([address]  Resolved. Continue to monitor. Other acute pulmonary embolism without acute cor pulmonale (HCC)  Continue on Eliquis - has medication for full 90 days. Prostate cancer [facility])  Continue follow up with Urology. Cough  B/P well controlled with the lisinopril. I did advise that we can change the lisinopril if cough remains - may consider OTC allergy medications for now until his follow up with Dr. Latanya Copeland. Patient agreeable. Medical Decision Making: moderate complexity  Return in 3 months (on 9/9/2022) for chronic health conditions. On this date 6/9/2022 I have spent 30 minutes reviewing previous notes, test results and face to face with the patient discussing the diagnosis and importance of compliance with the treatment plan as well as documenting on the day of the visit. Subjective:   HPI    Inpatient course: Discharge summary reviewed- see chart. Interval history/Current status:     Hospitalized for post op ileus and pulmonary embolism. Underwent a Prostatectomy on 5/25 and then returned to ED 5/27 for nausea/vomiting and found to have post op ileus with an incidental pulmonary embolism on scan - was complaining of no SOB/chest pain.      Ileus - managed with NG tube feeds for 1 day and advanced to liquid diet for a few days and then able to return to normal diet. Resolved without any other intervention needed. Since hospital discharge has been doing well in regards to diet, good appetite. Has been having regular bowel movements - today did have a bowel movement that was a little more firm - plans to take some stool softeners to help. Is not longer taking the pain medication from prostatectomy. Pulmonary Embolism - being treated with Eliquis currently taking 10mg BID. Will switch next Wednesday to 5mg twice daily. No SOB/Chest pain. Denies any clotting disorders. Denies any previous DVTs/PEs. Denies any family hx of clotting disorders or DVT/PE. Prostatectomy - Doing okay - urinating, but no control - wearing a pad/diaper - having leakage. Follow up with Dr. Rylan Gillette in July. Has appointment with PT in 1-2 weeks. Having an issue with dry cough - thought it was from lisinopril. Is intermittent over the past year. Has not been checking blood pressure readings at home. Otherwise has been on the lisinopril for a few years, but just noticed the cough started one year ago. Thought was from allergies - tickle in his throat. Patient Active Problem List   Diagnosis    Pure hypercholesterolemia    HTN (hypertension)    Type 2 diabetes mellitus without complication, without long-term current use of insulin ([address]    Prostate cancer ([address]    Acute pulmonary embolism ([address]    Postoperative ileus ([address]    Ileus ([address]     Medications listed as ordered at the time of discharge from hospital     Medication List          Accurate as of June 9, 2022 11:59 PM. If you have any questions, ask your nurse or doctor.             CONTINUE taking these medications    * apixaban 5 MG Tabs tablet  Commonly known as: ELIQUIS  Take 2 tablets by mouth 2 times daily for 14 doses     * apixaban 5 MG Tabs tablet  Commonly known as: ELIQUIS  Take 1 tablet by mouth 2 times daily Start taking after 10mg bid x 7 days completed     lisinopril 10 MG tablet  Commonly known as: PRINIVIL;ZESTRIL  TAKE 1 TABLET BY MOUTH EVERY DAY     metFORMIN 500 MG extended release tablet  Commonly known as: GLUCOPHAGE-XR  TAKE 2 TABLETS BY MOUTH DAILY (WITH BREAKFAST)     simvastatin 40 MG tablet  Commonly known as: ZOCOR  TAKE 1 TABLET BY MOUTH EVERY DAY EVERY NIGHT     therapeutic multivitamin-minerals tablet     vitamin D3 25 MCG (1000 UT) Tabs tablet  Commonly known as: CHOLECALCIFEROL  Take 1 tablet by mouth daily         * This list has 2 medication(s) that are the same as other medications prescribed for you. Read the directions carefully, and ask your doctor or other care provider to review them with you. STOP taking these medications    senna-docusate 8.6-50 MG per tablet  Commonly known as: PERICOLACE  Stopped by: JUVENCIO Colmenares CNP           Medications marked \"taking\" at this time  Outpatient Medications Marked as Taking for the 6/9/22 encounter (Office Visit) with JUVENCIO Colmenares CNP   Medication Sig Dispense Refill    apixaban (ELIQUIS) 5 MG TABS tablet Take 2 tablets by mouth 2 times daily for 14 doses 28 tablet 0    metFORMIN (GLUCOPHAGE-XR) 500 MG extended release tablet TAKE 2 TABLETS BY MOUTH DAILY (WITH BREAKFAST) 180 tablet 2    lisinopril (PRINIVIL;ZESTRIL) 10 MG tablet TAKE 1 TABLET BY MOUTH EVERY DAY 90 tablet 3    simvastatin (ZOCOR) 40 MG tablet TAKE 1 TABLET BY MOUTH EVERY DAY EVERY NIGHT 90 tablet 3    Multiple Vitamins-Minerals (THERAPEUTIC MULTIVITAMIN-MINERALS) tablet Take 1 tablet by mouth daily      Cholecalciferol (VITAMIN D3) 1000 units TABS Take 1 tablet by mouth daily 30 tablet 0      Medications patient taking as of now reconciled against medications ordered at time of hospital discharge: Yes    Review of Systems   Constitutional: Negative for activity change, appetite change, chills, fatigue and fever. Respiratory: Negative for cough and shortness of breath.     Cardiovascular: Negative for chest pain and palpitations. Gastrointestinal: Negative for diarrhea, nausea and vomiting. Objective:    /80   Temp 97.3 °F (36.3 °C)   Wt 219 lb (99.3 kg)   BMI 29.70 kg/m²   Physical Exam  Constitutional:       General: He is not in acute distress. Appearance: He is well-developed. HENT:      Head: Normocephalic and atraumatic. Cardiovascular:      Rate and Rhythm: Normal rate and regular rhythm. Heart sounds: Normal heart sounds, S1 normal and S2 normal.   Pulmonary:      Effort: Pulmonary effort is normal. No respiratory distress. Breath sounds: Normal breath sounds. Skin:     General: Skin is warm and dry. Neurological:      Mental Status: He is alert and oriented to person, place, and time. Psychiatric:         Thought Content: Thought content normal.         Judgment: Judgment normal.         An electronic signature was used to authenticate this note.   --Elsworth Galeazzi, APRN - CNP

## 2022-06-15 ENCOUNTER — CARE COORDINATION (OUTPATIENT)
Dept: CASE MANAGEMENT | Age: 72
End: 2022-06-15

## 2022-06-15 NOTE — CARE COORDINATION
[facility] Transitions Follow Up Call    6/15/2022    Patient: Tae Menezes  Patient : 1950   MRN: [id number]  Reason for Admission: Post op Ileus  Discharge Date: 22 RARS: Readmission Risk Score: 11 ( )         Spoke with: [address] Transitions Follow Up Call    Needs to be reviewed by the provider   Additional needs identified to be addressed with provider: No  none             Method of communication with provider : none      LPN Care Coordinator contacted the patient by telephone to follow up after admission on 22. Verified name and  with patient as identifiers. Addressed changes since last contact: F/u with CNP  Discussed follow-up appointments. If no appointment was previously scheduled, appointment scheduling offered: Yes. Is follow up appointment scheduled within 7 days of discharge? Yes. Advance Care Planning:   Does patient have an Advance Directive: Not on file  LPN CC reviewed discharge instructions, medical action plan and red flags with patient and discussed any barriers to care and/or understanding of plan of care after discharge. Discussed appropriate site of care based on symptoms and resources available to patient including: PCP  Specialist  Urgent care clinics  When to call [address] The patient agrees to contact the PCP office for questions related to their healthcare. Patients top risk factors for readmission: ineffective coping  Interventions to address risk factors: Obtained and reviewed discharge summary and/or continuity of care documents      Non-[facility] follow up appointment(s):     LPN CC provided contact information for future needs. Plan for follow-up call in 5-7 days based on severity of symptoms and risk factors. Plan for next call: self management-Healthy diet     This [facility] spoke with pt and pt stated that he is doing \"fine. \" Patient denied any worsening symptoms.  Denied fever, chills, N/V and any difficulty breathing at this time. Denied chest pain and SOB. Denied difficulty with urination, BMs or appetite, ABD pain. Pt had a f/u with CNP and it went well. No new changes or medications to report or address. Denied any needs and concerns at this time. Advised pt to immediately report any worsening symptoms to the PCP. Patient verbalized understanding and agreed. Cassidy Jc LPN, [facility]  PH: [phone]            Care Transitions Subsequent and Final Call    Schedule Follow Up Appointment with PCP: Completed  Subsequent and Final Calls  Do you have any ongoing symptoms?: No  Have your medications changed?: No  Do you have any questions related to your medications?: No  Do you currently have any active services?: No  Do you have any needs or concerns that I can assist you with?: No  Identified Barriers: None  Care Transitions Interventions  No Identified Needs  Other Interventions:            Follow Up  Future Appointments   Date Time Provider Meghan Morales   8/19/2022  3:15 PM Nneka Montiel.MD Alma Rosa LPN

## 2022-06-20 ASSESSMENT — ENCOUNTER SYMPTOMS
VOMITING: 0
COUGH: 0
NAUSEA: 0
DIARRHEA: 0
SHORTNESS OF BREATH: 0

## 2022-06-22 ENCOUNTER — CARE COORDINATION (OUTPATIENT)
Dept: CASE MANAGEMENT | Age: 72
End: 2022-06-22

## 2022-06-22 NOTE — CARE COORDINATION
[facility] Transitions Follow Up Call    2022    Patient: Magali Briones  Patient : 1950   MRN: [id number]  Reason for Admission:   Discharge Date: 22 RARS: Readmission Risk Score: 11 ( )    Follow up call attempted, left contact info on vm      Follow Up  Future Appointments   Date Time Provider Meghan Morales   2022  3:15 PM Sunshine Trejo., MD Gilford Leos, RN

## 2022-06-24 ENCOUNTER — CARE COORDINATION (OUTPATIENT)
Dept: CASE MANAGEMENT | Age: 72
End: 2022-06-24

## 2022-06-24 NOTE — CARE COORDINATION
[facility] Transitions Follow Up Call    2022    Patient: Jagdish Gray  Patient : 1950   MRN: [id number]  Reason for Admission:  PE, Ileus  Discharge Date: 22 RARS: Readmission Risk Score: 11 ( )         Spoke with: [address] Transitions Follow Up Call    Needs to be reviewed by the provider   Additional needs identified to be addressed with provider: No  none             Method of communication with provider : none      LPN Care Coordinator contacted the patient by telephone to follow up after admission on 22. Verified name and  with patient as identifiers. Addressed changes since last contact: Pt back at work  Discussed follow-up appointments. If no appointment was previously scheduled, appointment scheduling offered: Yes. Is follow up appointment scheduled within 7 days of discharge? Yes. Advance Care Planning:   Does patient have an Advance Directive: Not on file  LPN CC reviewed discharge instructions, medical action plan and red flags with patient and discussed any barriers to care and/or understanding of plan of care after discharge. Discussed appropriate site of care based on symptoms and resources available to patient including: PCP  Specialist  Urgent care clinics  When to call [address] The patient agrees to contact the PCP office for questions related to their healthcare. Patients top risk factors for readmission: ineffective coping  Interventions to address risk factors: Obtained and reviewed discharge summary and/or continuity of care documents      Non-[facility] follow up appointment(s):     LPN CC provided contact information for future needs. Plan for follow-up call in 5-7 days based on severity of symptoms and risk factors. Plan for next call: self management-Healthy diet         This [facility] spoke with pt and pt stated that he is doing well and back at work. Patient denied any worsening symptoms.  Denied fever, chills, N/V and any difficulty breathing at this time. Denied chest pain and SOB. Denied difficulty with urination, BMs or appetite, ABD pain. Pt had a f/u with CNP and it went well. No new changes or medications to report or address. Denied any needs and concerns at this time. Advised pt to immediately report any worsening symptoms to the PCP. Patient verbalized understanding and agreed  Conrad Ayers LPN, [facility]  PH: [phone]          Care Transitions Subsequent and Final Call    Schedule Follow Up Appointment with PCP: Completed  Subsequent and Final Calls  Do you have any ongoing symptoms?: No  Have your medications changed?: No  Do you have any questions related to your medications?: No  Do you currently have any active services?: No  Do you have any needs or concerns that I can assist you with?: No  Identified Barriers: None  Care Transitions Interventions  No Identified Needs  Other Interventions:            Follow Up  Future Appointments   Date Time Provider Meghan Morales   8/19/2022  3:15 PM MD Noemi Stewart LPN

## 2022-07-01 ENCOUNTER — CARE COORDINATION (OUTPATIENT)
Dept: CASE MANAGEMENT | Age: 72
End: 2022-07-01

## 2022-07-01 NOTE — CARE COORDINATION
breathing at this time. Denied chest pain and SOB. Denied difficulty with urination, BMs or appetite, ABD pain. Pt had a f/u with CNP and it went well. No new changes or medications to report or address. Denied any needs and concerns at this time. Advised pt to immediately report any worsening symptoms to the PCP. Patient verbalized understanding and agreed  Kenna Carmona LPN, [facility]  PH: [phone]          Care Transitions Subsequent and Final Call    Schedule Follow Up Appointment with PCP: Completed  Subsequent and Final Calls  Do you have any ongoing symptoms?: No  Have your medications changed?: No  Do you have any questions related to your medications?: No  Do you currently have any active services?: No  Do you have any needs or concerns that I can assist you with?: No  Identified Barriers: None  Care Transitions Interventions  No Identified Needs  Other Interventions:            Follow Up  Future Appointments   Date Time Provider Meghan Morales   8/19/2022  3:15 PM MD Jay Verdugo LPN

## 2022-07-13 NOTE — TELEPHONE ENCOUNTER
Was prescribed this after complications after surgery and is about to run out. Wants to see if RB will send him some in.       apixaban (ELIQUIS) 5 MG TABS tablet 28 tablet 0 5/31/2022 6/9/2022    Sig - Route:  Take 2 tablets by mouth 2 times daily for 14 doses - Oral          [facility] [address]     Please advise

## 2022-08-06 ENCOUNTER — HOSPITAL ENCOUNTER (OUTPATIENT)
Age: 72
Discharge: HOME | End: 2022-08-06
Payer: MEDICARE

## 2022-08-06 DIAGNOSIS — E11.9 TYPE 2 DIABETES MELLITUS WITHOUT COMPLICATION, WITHOUT LONG-TERM CURRENT USE OF INSULIN (HCC): ICD-10-CM

## 2022-08-06 DIAGNOSIS — E78.00 PURE HYPERCHOLESTEROLEMIA: ICD-10-CM

## 2022-08-06 LAB
A/G RATIO: 1.3 (ref 1.1–2.2)
ALBUMIN SERPL-MCNC: 4 G/DL (ref 3.4–5)
ALP BLD-CCNC: 69 U/L (ref 40–129)
ALT SERPL-CCNC: 23 U/L (ref 10–40)
ANION GAP SERPL CALCULATED.3IONS-SCNC: 12 MMOL/L (ref 3–16)
AST SERPL-CCNC: 16 U/L (ref 15–37)
BASOPHILS ABSOLUTE: 0 K/UL (ref 0–0.2)
BASOPHILS RELATIVE PERCENT: 0.5 %
BILIRUB SERPL-MCNC: 0.5 MG/DL (ref 0–1)
BUN BLDV-MCNC: 11 MG/DL (ref 7–20)
CALCIUM SERPL-MCNC: 8.9 MG/DL (ref 8.3–10.6)
CHLORIDE BLD-SCNC: 104 MMOL/L (ref 99–110)
CHOLESTEROL, FASTING: 152 MG/DL (ref 0–199)
CO2: 24 MMOL/L (ref 21–32)
CREAT SERPL-MCNC: 0.8 MG/DL (ref 0.8–1.3)
EOSINOPHILS ABSOLUTE: 0.4 K/UL (ref 0–0.6)
EOSINOPHILS RELATIVE PERCENT: 6.2 %
GFR AFRICAN AMERICAN: >60
GFR NON-AFRICAN AMERICAN: >60
GLUCOSE FASTING: 128 MG/DL (ref 70–99)
HCT VFR BLD CALC: 42.1 % (ref 40.5–52.5)
HDLC SERPL-MCNC: 63 MG/DL (ref 40–60)
HEMOGLOBIN: 14 G/DL (ref 13.5–17.5)
LDL CHOLESTEROL CALCULATED: 63 MG/DL
LYMPHOCYTES ABSOLUTE: 1.5 K/UL (ref 1–5.1)
LYMPHOCYTES RELATIVE PERCENT: 22 %
MCH RBC QN AUTO: 30.2 PG (ref 26–34)
MCHC RBC AUTO-ENTMCNC: 33.3 G/DL (ref 31–36)
MCV RBC AUTO: 90.6 FL (ref 80–100)
MONOCYTES ABSOLUTE: 0.8 K/UL (ref 0–1.3)
MONOCYTES RELATIVE PERCENT: 11.1 %
NEUTROPHILS ABSOLUTE: 4.1 K/UL (ref 1.7–7.7)
NEUTROPHILS RELATIVE PERCENT: 60.2 %
PDW BLD-RTO: 13.4 % (ref 12.4–15.4)
PLATELET # BLD: 209 K/UL (ref 135–450)
PMV BLD AUTO: 9.3 FL (ref 5–10.5)
POTASSIUM SERPL-SCNC: 4.4 MMOL/L (ref 3.5–5.1)
RBC # BLD: 4.65 M/UL (ref 4.2–5.9)
SODIUM BLD-SCNC: 140 MMOL/L (ref 136–145)
TOTAL PROTEIN: 7 G/DL (ref 6.4–8.2)
TRIGLYCERIDE, FASTING: 130 MG/DL (ref 0–150)
VLDLC SERPL CALC-MCNC: 26 MG/DL
WBC # BLD: 6.8 K/UL (ref 4–11)

## 2022-08-06 PROCEDURE — 80053 COMPREHEN METABOLIC PANEL: CPT

## 2022-08-06 PROCEDURE — 83036 HEMOGLOBIN GLYCOSYLATED A1C: CPT

## 2022-08-06 PROCEDURE — 80061 LIPID PANEL: CPT

## 2022-08-06 PROCEDURE — 85025 COMPLETE CBC W/AUTO DIFF WBC: CPT

## 2022-08-06 PROCEDURE — 36415 COLL VENOUS BLD VENIPUNCTURE: CPT

## 2022-08-07 LAB
ESTIMATED AVERAGE GLUCOSE: 139.9 MG/DL
HBA1C MFR BLD: 6.5 %

## 2022-08-18 SDOH — HEALTH STABILITY: PHYSICAL HEALTH: ON AVERAGE, HOW MANY DAYS PER WEEK DO YOU ENGAGE IN MODERATE TO STRENUOUS EXERCISE (LIKE A BRISK WALK)?: 2 DAYS

## 2022-08-18 SDOH — HEALTH STABILITY: PHYSICAL HEALTH: ON AVERAGE, HOW MANY MINUTES DO YOU ENGAGE IN EXERCISE AT THIS LEVEL?: 60 MIN

## 2022-08-18 ASSESSMENT — LIFESTYLE VARIABLES
HOW MANY STANDARD DRINKS CONTAINING ALCOHOL DO YOU HAVE ON A TYPICAL DAY: PATIENT DOES NOT DRINK
HOW OFTEN DO YOU HAVE SIX OR MORE DRINKS ON ONE OCCASION: 1
HOW MANY STANDARD DRINKS CONTAINING ALCOHOL DO YOU HAVE ON A TYPICAL DAY: 0
HOW OFTEN DO YOU HAVE A DRINK CONTAINING ALCOHOL: 1
HOW OFTEN DO YOU HAVE A DRINK CONTAINING ALCOHOL: NEVER

## 2022-08-18 ASSESSMENT — PATIENT HEALTH QUESTIONNAIRE - PHQ9
SUM OF ALL RESPONSES TO PHQ QUESTIONS 1-9: 0
2. FEELING DOWN, DEPRESSED OR HOPELESS: 0
SUM OF ALL RESPONSES TO PHQ QUESTIONS 1-9: 0
SUM OF ALL RESPONSES TO PHQ9 QUESTIONS 1 & 2: 0
1. LITTLE INTEREST OR PLEASURE IN DOING THINGS: 0
SUM OF ALL RESPONSES TO PHQ QUESTIONS 1-9: 0
SUM OF ALL RESPONSES TO PHQ QUESTIONS 1-9: 0

## 2022-08-19 ENCOUNTER — OFFICE VISIT (OUTPATIENT)
Dept: FAMILY MEDICINE CLINIC | Age: 72
End: 2022-08-19
Payer: MEDICARE

## 2022-08-19 VITALS
DIASTOLIC BLOOD PRESSURE: 80 MMHG | TEMPERATURE: 97.2 F | WEIGHT: 222 LBS | BODY MASS INDEX: 30.07 KG/M2 | HEIGHT: 72 IN | SYSTOLIC BLOOD PRESSURE: 110 MMHG

## 2022-08-19 DIAGNOSIS — C61 PROSTATE CANCER (HCC): ICD-10-CM

## 2022-08-19 DIAGNOSIS — E11.9 TYPE 2 DIABETES MELLITUS WITHOUT COMPLICATION, WITHOUT LONG-TERM CURRENT USE OF INSULIN (HCC): ICD-10-CM

## 2022-08-19 DIAGNOSIS — Z00.00 MEDICARE ANNUAL WELLNESS VISIT, SUBSEQUENT: Primary | ICD-10-CM

## 2022-08-19 DIAGNOSIS — Z12.11 ENCOUNTER FOR SCREENING COLONOSCOPY: ICD-10-CM

## 2022-08-19 DIAGNOSIS — I26.99 OTHER ACUTE PULMONARY EMBOLISM WITHOUT ACUTE COR PULMONALE (HCC): ICD-10-CM

## 2022-08-19 DIAGNOSIS — E78.00 PURE HYPERCHOLESTEROLEMIA: ICD-10-CM

## 2022-08-19 DIAGNOSIS — I10 PRIMARY HYPERTENSION: Chronic | ICD-10-CM

## 2022-08-19 PROCEDURE — G8427 DOCREV CUR MEDS BY ELIG CLIN: HCPCS | Performed by: FAMILY MEDICINE

## 2022-08-19 PROCEDURE — 3044F HG A1C LEVEL LT 7.0%: CPT | Performed by: FAMILY MEDICINE

## 2022-08-19 PROCEDURE — 3017F COLORECTAL CA SCREEN DOC REV: CPT | Performed by: FAMILY MEDICINE

## 2022-08-19 PROCEDURE — 1036F TOBACCO NON-USER: CPT | Performed by: FAMILY MEDICINE

## 2022-08-19 PROCEDURE — G8417 CALC BMI ABV UP PARAM F/U: HCPCS | Performed by: FAMILY MEDICINE

## 2022-08-19 PROCEDURE — 1123F ACP DISCUSS/DSCN MKR DOCD: CPT | Performed by: FAMILY MEDICINE

## 2022-08-19 PROCEDURE — 99214 OFFICE O/P EST MOD 30 MIN: CPT | Performed by: FAMILY MEDICINE

## 2022-08-19 PROCEDURE — G0439 PPPS, SUBSEQ VISIT: HCPCS | Performed by: FAMILY MEDICINE

## 2022-08-19 PROCEDURE — 2022F DILAT RTA XM EVC RTNOPTHY: CPT | Performed by: FAMILY MEDICINE

## 2022-08-19 SDOH — ECONOMIC STABILITY: FOOD INSECURITY: WITHIN THE PAST 12 MONTHS, THE FOOD YOU BOUGHT JUST DIDN'T LAST AND YOU DIDN'T HAVE MONEY TO GET MORE.: NEVER TRUE

## 2022-08-19 SDOH — ECONOMIC STABILITY: FOOD INSECURITY: WITHIN THE PAST 12 MONTHS, YOU WORRIED THAT YOUR FOOD WOULD RUN OUT BEFORE YOU GOT MONEY TO BUY MORE.: NEVER TRUE

## 2022-08-19 ASSESSMENT — ENCOUNTER SYMPTOMS
DIARRHEA: 0
CONSTIPATION: 0
SHORTNESS OF BREATH: 0
RHINORRHEA: 0
BACK PAIN: 0

## 2022-08-19 ASSESSMENT — SOCIAL DETERMINANTS OF HEALTH (SDOH): HOW HARD IS IT FOR YOU TO PAY FOR THE VERY BASICS LIKE FOOD, HOUSING, MEDICAL CARE, AND HEATING?: NOT HARD AT ALL

## 2022-08-19 NOTE — PATIENT INSTRUCTIONS
Personalized Preventive Plan for Mehdi Hurt - 8/19/2022  Medicare offers a range of preventive health benefits. Some of the tests and screenings are paid in full while other may be subject to a deductible, co-insurance, and/or copay. Some of these benefits include a comprehensive review of your medical history including lifestyle, illnesses that may run in your family, and various assessments and screenings as appropriate. After reviewing your medical record and screening and assessments performed today your provider may have ordered immunizations, labs, imaging, and/or referrals for you. A list of these orders (if applicable) as well as your Preventive Care list are included within your After Visit Summary for your review. Other Preventive Recommendations:    A preventive eye exam performed by an eye specialist is recommended every 1-2 years to screen for glaucoma; cataracts, macular degeneration, and other eye disorders. A preventive dental visit is recommended every 6 months. Try to get at least 150 minutes of exercise per week or 10,000 steps per day on a pedometer . Order or download the FREE \"Exercise & Physical Activity: Your Everyday Guide\" from [employer]. Call 2-839.548.5256 or search [employer] online. You need 3336-4738 mg of calcium and 0220-5692 IU of vitamin D per day. It is possible to meet your calcium requirement with diet alone, but a vitamin D supplement is usually necessary to meet this goal.  When exposed to the sun, use a sunscreen that protects against both UVA and UVB radiation with an SPF of 30 or greater. Reapply every 2 to 3 hours or after sweating, drying off with a towel, or swimming. Always wear a seat belt when traveling in a car. Always wear a helmet when riding a bicycle or motorcycle.

## 2022-08-19 NOTE — PROGRESS NOTES
SUBJECTIVE:    Ike Snyder is a 71 y.o. male who presents for a follow up visit. Chief Complaint   Patient presents with    Medicare AWV     AWV/RTC. No new concerns. Discuss lab. Hyperlipidemia  This is a chronic problem. The current episode started more than 1 year ago. Lipid results: Total cholesterol 152, triglycerides 130, HDL 63, LDL 63. Exacerbating diseases include diabetes and obesity. Pertinent negatives include no chest pain or shortness of breath. Current antihyperlipidemic treatment includes statins. The current treatment provides significant improvement of lipids. Compliance problems include adherence to exercise and adherence to diet. Risk factors for coronary artery disease include diabetes mellitus, dyslipidemia, male sex, obesity, a sedentary lifestyle and hypertension. Hypertension  This is a chronic problem. The current episode started more than 1 year ago. The problem is controlled. Pertinent negatives include no chest pain, headaches or shortness of breath. Risk factors for coronary artery disease include sedentary lifestyle, obesity, dyslipidemia, diabetes mellitus and male gender. Past treatments include ACE inhibitors. The current treatment provides significant improvement. Compliance problems include exercise and diet. Diabetes  He presents for his follow-up diabetic visit. He has type 2 diabetes mellitus. Disease course: Hemoglobin A1c 6.5. There are no hypoglycemic associated symptoms. Pertinent negatives for hypoglycemia include no dizziness, headaches or nervousness/anxiousness. Pertinent negatives for diabetes include no chest pain and no fatigue. There are no hypoglycemic complications. There are no diabetic complications. Risk factors for coronary artery disease include diabetes mellitus, dyslipidemia, male sex, obesity, sedentary lifestyle and hypertension.  Current diabetic treatments: Metformin extended release tablets 500 mg 2 tablets by mouth each morning release tablet TAKE 2 TABLETS BY MOUTH DAILY (WITH BREAKFAST) 180 tablet 2    lisinopril (PRINIVIL;ZESTRIL) 10 MG tablet TAKE 1 TABLET BY MOUTH EVERY DAY 90 tablet 3    simvastatin (ZOCOR) 40 MG tablet TAKE 1 TABLET BY MOUTH EVERY DAY EVERY NIGHT 90 tablet 3    Multiple Vitamins-Minerals (THERAPEUTIC MULTIVITAMIN-MINERALS) tablet Take 1 tablet by mouth daily      Cholecalciferol (VITAMIN D3) 1000 units TABS Take 1 tablet by mouth daily 30 tablet 0     No current facility-administered medications on file prior to visit. Review of Systems   Constitutional:  Negative for activity change and fatigue. HENT:  Negative for congestion, postnasal drip and rhinorrhea. Respiratory:  Negative for shortness of breath. Cardiovascular:  Negative for chest pain and leg swelling. Gastrointestinal:  Negative for constipation and diarrhea. Genitourinary:  Negative for difficulty urinating. Musculoskeletal:  Positive for arthralgias (knees). Negative for back pain. Neurological:  Negative for dizziness, light-headedness and headaches. Psychiatric/Behavioral:  Negative for dysphoric mood and sleep disturbance. The patient is not nervous/anxious. OBJECTIVE:    /80   Temp 97.2 °F (36.2 °C)   Ht 6' (1.829 m)   Wt 222 lb (100.7 kg)   BMI 30.11 kg/m²    Physical Exam  Constitutional:       Appearance: He is well-developed. HENT:      Head: Normocephalic and atraumatic. Right Ear: External ear normal.      Left Ear: External ear normal.      Nose: Nose normal.   Eyes:      General:         Right eye: No discharge. Conjunctiva/sclera: Conjunctivae normal.   Neck:      Thyroid: No thyromegaly. Vascular: No JVD. Trachea: No tracheal deviation. Cardiovascular:      Rate and Rhythm: Normal rate and regular rhythm. Heart sounds: Normal heart sounds. Pulmonary:      Effort: Pulmonary effort is normal. No respiratory distress. Breath sounds: Normal breath sounds. No rales. Musculoskeletal:      Cervical back: Normal range of motion and neck supple. Lymphadenopathy:      Cervical: No cervical adenopathy. Skin:     General: Skin is warm and dry. Neurological:      Mental Status: He is alert and oriented to person, place, and time. ASSESSMENT/PLAN:    Alejandra Teresa was seen today for medicare awv. Diagnoses and all orders for this visit:    Medicare annual wellness visit, subsequent  AWV done today  Other acute pulmonary embolism without acute cor pulmonale ([address]  Is almost finished with his 3 months of Eliquis after his pulmonary embolus. Prostate cancer [facility])  Doing well postoperatively. Still followed by urology. Still having some problems with incontinence. Primary hypertension  Excellent control with current medications    Pure hypercholesterolemia  LDL is at goal of less than 70 with a value of 63. Also his HDL is excellent at 63.  -     Comprehensive Metabolic Panel, Fasting; Future  -     CBC with Auto Differential; Future  -     Lipid, Fasting; Future  -     TSH with Reflex; Future    Type 2 diabetes mellitus without complication, without long-term current use of insulin (HCC)  Hemoglobin A1c from Aug 6 was 6.5. Blood sugars remain in good control with use of metformin 500 mg 2 tablets daily.  -     Hemoglobin A1C; Future    Encounter for screening colonoscopy  -     [facility] - Frieda Costello MD, Gastroenterology (ERCP & EUS), [facility]      Return in 6 months (on 2/19/2023) and for Medicare Annual Wellness Visit in 1 year. Please note portions of this note were completed with a voicerecognition program.  Efforts were made to edit the dictations but occasionally words are mis-transcribed. Medicare Annual Wellness Visit    Britt Gomes is here for Medicare AWV (AWV/RTC. No new concerns.   Discuss lab. )    Assessment & Plan   Medicare annual wellness visit, subsequent  Other acute pulmonary embolism without acute cor pulmonale (HCC)  Prostate cancer ([facility] [address]  Primary hypertension  Pure hypercholesterolemia  -     Comprehensive Metabolic Panel, Fasting; Future  -     CBC with Auto Differential; Future  -     Lipid, Fasting; Future  -     TSH with Reflex; Future  Type 2 diabetes mellitus without complication, without long-term current use of insulin (HCC)  -     Hemoglobin A1C; Future  Encounter for screening colonoscopy  -     [facility] - Srikanth Hou MD, Gastroenterology (ERCP & EUS), [facility]    Recommendations for Preventive Services Due: see orders and patient instructions/AVS.  Recommended screening schedule for the next 5-10 years is provided to the patient in written form: see Patient Instructions/AVS.     Return in 6 months (on 2/19/2023) for Medicare Annual Wellness Visit in 1 year. Subjective       Patient's complete Health Risk Assessment and screening values have been reviewed and are found in Flowsheets. The following problems were reviewed today and where indicated follow up appointments were made and/or referrals ordered.     Positive Risk Factor Screenings with Interventions:             General Health and ACP:  General  In general, how would you say your health is?: Very Good  In the past 7 days, have you experienced any of the following: New or Increased Pain, New or Increased Fatigue, Loneliness, Social Isolation, Stress or Anger?: No  Do you get the social and emotional support that you need?: Yes  Do you have a Living Will?: (!) No    Advance Directives       Power of  Living Will ACP-Advance Directive ACP-Power of     Not on File Not on File Not on File Not on File        General Health Risk Interventions:  No Living Will: Advance Care Planning addressed with patient today    Health Habits/Nutrition:  Physical Activity: Insufficiently Active    Days of Exercise per Week: 2 days    Minutes of Exercise per Session: 60 min     Have you lost any weight without trying in the past 3 months?: (!) Yes  Body mass index: Harjeet Nash ) 30.11  Have you seen the dentist within the past year?: Yes  Health Habits/Nutrition Interventions:  Nutritional issues:  patient is not ready to address his/her nutritional/weight issues at this time             Objective   Vitals:    08/19/22 1536   BP: 110/80   Temp: 97.2 °F (36.2 °C)   Weight: 222 lb (100.7 kg)   Height: 6' (1.829 m)      Body mass index is 30.11 kg/m². Allergies   Allergen Reactions    No Known Allergies      Prior to Visit Medications    Medication Sig Taking?  Authorizing Provider   apixaban (ELIQUIS) 5 MG TABS tablet Take 1 tablet by mouth 2 times daily Yes Chandrakant Short MD   metFORMIN (GLUCOPHAGE-XR) 500 MG extended release tablet TAKE 2 TABLETS BY MOUTH DAILY (WITH BREAKFAST) Yes JUVENCIO Boo CNP   lisinopril (PRINIVIL;ZESTRIL) 10 MG tablet TAKE 1 TABLET BY MOUTH EVERY DAY Yes Chandrakant Short MD   simvastatin (ZOCOR) 40 MG tablet TAKE 1 TABLET BY MOUTH EVERY DAY EVERY NIGHT Yes Chandrakant Short MD   Multiple Vitamins-Minerals (THERAPEUTIC MULTIVITAMIN-MINERALS) tablet Take 1 tablet by mouth daily Yes Historical Provider, MD   Cholecalciferol (VITAMIN D3) 1000 units TABS Take 1 tablet by mouth daily Yes Genia Jade MD       [facility] (Including outside providers/suppliers regularly involved in providing care):   Patient Care Team:  Chandrakant Short MD as PCP - General (Family Medicine)  Chandrakant Short MD as PCP - [facility] Empaneled Provider     Reviewed and updated this visit:  Tobacco  Allergies  Meds  Med Hx  Surg Hx  Soc Hx  Fam Hx

## 2022-08-30 ENCOUNTER — TELEPHONE (OUTPATIENT)
Dept: FAMILY MEDICINE CLINIC | Age: 72
End: 2022-08-30

## 2022-09-06 RX ORDER — APIXABAN 5 MG/1
TABLET, FILM COATED ORAL
Qty: 60 TABLET | Refills: 5 | Status: SHIPPED | OUTPATIENT
Start: 2022-09-06

## 2022-09-13 RX ORDER — LISINOPRIL 10 MG/1
TABLET ORAL
Qty: 90 TABLET | Refills: 1 | Status: SHIPPED | OUTPATIENT
Start: 2022-09-13

## 2022-09-27 NOTE — H&P
HOSPITALISTS HISTORY AND PHYSICAL    5/27/2022 2:18 PM    Patient Information:  Collins Veronica is a 80 y.o. male [id number]  PCP:  Janie Velazquez MD (Tel: [phone] )    Chief complaint:    Chief Complaint   Patient presents with    Post-op Problem     Pt had a prostatectomy on May 25th and you started to run fever yesterday evening (temp 100.8). Pt has not had a bowel movement since before surgery and is not passing gas. Has taken enema at home prior to surgery. Pain medication last given at 2200 last night.  Nausea    Emesis    Abdominal Pain     Upper and lower abdominal pain that radiates between shoulders. History of Present Illness:  Loretto November is a 80 y.o. male who had prostatectomy for prostate cancer on the 25th. Patient felt better but then this morning to have nausea and multiple episode of vomiting had a low-grade fever yesterday patient denies any chest pain cough shortness of breath no diarrhea last bowel movement was before the prostatectomy. REVIEW OF SYSTEMS:   Constitutional: see above  ENT: Negative for rhinorrhea, epistaxis, hoarseness, sore throat. Respiratory: Negative for shortness of breath,wheezing  Cardiovascular: Negative for chest pain, palpitations   Gastrointestinalsee above  Genitourinary: Negative for polyuria, dysuria   Hematologic/Lymphatic: Negative for bleeding tendency, easy bruising  Musculoskeletal: Negative for myalgias and arthralgias  Neurologic: Negative for confusion,dysarthria. Skin: Negative for itching,rash  Psychiatric: Negative for depression,anxiety, agitation. Endocrine: Negative for polydipsia,polyuria,heat /cold intolerance.     Past Medical History:   has a past medical history of Hyperlipidemia, Hypertension, and Type II or unspecified type diabetes mellitus without mention of complication, not stated as uncontrolled. Past Surgical History:   has a past surgical history that includes shoulder surgery (Right, 10/30/2013); Foot surgery; Knee arthroscopy; and Prostatectomy (N/A, 5/25/2022). Medications:  No current facility-administered medications on file prior to encounter. Current Outpatient Medications on File Prior to Encounter   Medication Sig Dispense Refill    HYDROcodone-acetaminophen (NORCO) 5-325 MG per tablet Take 1 tablet by mouth every 6 hours as needed for Pain for up to 5 days. Intended supply: 5 days. Take lowest dose possible to manage pain. 20 tablet 0    senna-docusate (PERICOLACE) 8.6-50 MG per tablet Take 1 tablet by mouth 2 times daily For constipation while on pain medication. 30 tablet 1    metFORMIN (GLUCOPHAGE-XR) 500 MG extended release tablet TAKE 2 TABLETS BY MOUTH DAILY (WITH BREAKFAST) 180 tablet 2    lisinopril (PRINIVIL;ZESTRIL) 10 MG tablet TAKE 1 TABLET BY MOUTH EVERY DAY 90 tablet 3    simvastatin (ZOCOR) 40 MG tablet TAKE 1 TABLET BY MOUTH EVERY DAY EVERY NIGHT 90 tablet 3    Multiple Vitamins-Minerals (THERAPEUTIC MULTIVITAMIN-MINERALS) tablet Take 1 tablet by mouth daily      Cholecalciferol (VITAMIN D3) 1000 units TABS Take 1 tablet by mouth daily 30 tablet 0       Allergies: Allergies   Allergen Reactions    No Known Allergies         Social History:  Patient Lives at home   reports that he has never smoked. He has never used smokeless tobacco. He reports that he does not drink alcohol and does not use drugs. Family History:  family history includes Breast Cancer in his mother; Diabetes in his maternal aunt; Heart Attack in his mother. ,     Physical Exam:  /69   Pulse 89   Temp 98.3 °F (36.8 °C)   Resp 18   Ht 6' (1.829 m)   Wt 225 lb (102.1 kg)   SpO2 92%   BMI 30.52 kg/m²     General appearance:  Appears comfortable.  AAOx3  HEENT: atraumatic, Pupils equal, muscous membranes moist, no masses appreciated  Cardiovascular: Regular rate and rhythm no murmurs appreciated  Respiratory: CTAB no wheezing  Gastrointestinal: Bowel sounds positive abdomen slightly tender to palpation no guarding or rigidity  EXT: no edema  Neurology: no gross focal deficts  Psychiatry: Appropriate affect. Not agitated  Skin: Warm, dry, no rashes appreciated    Labs:  CBC:   Lab Results   Component Value Date    WBC 11.4 05/27/2022    RBC 4.71 05/27/2022    HGB 14.3 05/27/2022    HCT 43.4 05/27/2022    MCV 92.2 05/27/2022    MCH 30.4 05/27/2022    MCHC 33.0 05/27/2022    RDW 13.7 05/27/2022     05/27/2022    MPV 9.5 05/27/2022     BMP:    Lab Results   Component Value Date     05/27/2022    K 3.9 05/27/2022    CL 97 05/27/2022    CO2 24 05/27/2022    BUN 8 05/27/2022    CREATININE 0.7 05/27/2022    CALCIUM 9.7 05/27/2022    GFRAA >60 05/27/2022    GFRAA >60 03/16/2013    LABGLOM >60 05/27/2022    GLUCOSE 240 05/27/2022     XR ABDOMEN FOR NG/OG/NE TUBE PLACEMENT   Final Result   Tip of NG tube projects in region of gastric fundus      Scattered gaseous distention throughout the abdomen, incompletely evaluated         CT ABDOMEN PELVIS W IV CONTRAST Additional Contrast? None   Final Result   Mild to moderate postoperative ileus. RECOMMENDATIONS:   Unavailable         CT CHEST PULMONARY EMBOLISM W CONTRAST   Final Result   Small right lower lobe subsegmental pulmonary emboli. Findings were discussed with Magaly Khan at 8:13 am on 5/27/2022. RECOMMENDATIONS:   Unavailable             Recent imaging reviewed    Problem List  Principal Problem:    Acute pulmonary embolism ([facility] [address]  Resolved Problems:    * No resolved hospital problems.  *        Assessment/Plan:   Acute pulmonary embolism  - heparin gtt    Ileus: ng tube to suction, surgery onboard npo    Dm2; SSI    DVT prophylaxis heparin gtt  Code status full code        Admit as inpatient I anticipate hospitalization spanning more than two midnights for investigation and treatment of the above medically necessary diagnoses. Please note that some part of this chart was generated using Dragon dictation software. Although every effort was made to ensure the accuracy of this automated transcription, some errors in transcription may have occurred inadvertently. If you may need any clarification, please do not hesitate to contact me through [facility].        David Nelson MD    5/27/2022 2:18 PM flank pain

## 2022-10-22 DIAGNOSIS — E11.9 TYPE 2 DIABETES MELLITUS WITHOUT COMPLICATION, WITHOUT LONG-TERM CURRENT USE OF INSULIN (HCC): ICD-10-CM

## 2022-10-24 RX ORDER — METFORMIN HYDROCHLORIDE 500 MG/1
1000 TABLET, EXTENDED RELEASE ORAL
Qty: 180 TABLET | Refills: 2 | Status: SHIPPED | OUTPATIENT
Start: 2022-10-24 | End: 2023-01-22

## 2022-10-24 NOTE — TELEPHONE ENCOUNTER
Medication:   Requested Prescriptions     Pending Prescriptions Disp Refills    metFORMIN (GLUCOPHAGE-XR) 500 MG extended release tablet [Pharmacy Med Name: METFORMIN HCL  MG TABLET] 180 tablet 2     Sig: TAKE 2 TABLETS BY MOUTH DAILY (WITH BREAKFAST). Last Filled:      Patient Phone Number: [phone] (home)     Last appt: 8/19/2022   Next appt: 2/23/2023    Last OARRS: No flowsheet data found.   PDMP Monitoring:    Last PDMP Janine ta Reviewed [facility]):  Review User Review Instant Review Result          Preferred Pharmacy:   [facility] [address] [address]  Phone: [phone] Fax: [phone]

## 2022-11-03 RX ORDER — SIMVASTATIN 40 MG
TABLET ORAL
Qty: 90 TABLET | Refills: 1 | Status: SHIPPED | OUTPATIENT
Start: 2022-11-03

## 2022-11-15 ENCOUNTER — OFFICE VISIT (OUTPATIENT)
Dept: FAMILY MEDICINE CLINIC | Age: 72
End: 2022-11-15
Payer: MEDICARE

## 2022-11-15 VITALS
DIASTOLIC BLOOD PRESSURE: 84 MMHG | OXYGEN SATURATION: 99 % | TEMPERATURE: 98.6 F | WEIGHT: 223 LBS | HEART RATE: 115 BPM | SYSTOLIC BLOOD PRESSURE: 136 MMHG | BODY MASS INDEX: 30.24 KG/M2

## 2022-11-15 DIAGNOSIS — I10 PRIMARY HYPERTENSION: Primary | ICD-10-CM

## 2022-11-15 PROCEDURE — 99213 OFFICE O/P EST LOW 20 MIN: CPT | Performed by: NURSE PRACTITIONER

## 2022-11-15 PROCEDURE — 3074F SYST BP LT 130 MM HG: CPT | Performed by: NURSE PRACTITIONER

## 2022-11-15 PROCEDURE — 3017F COLORECTAL CA SCREEN DOC REV: CPT | Performed by: NURSE PRACTITIONER

## 2022-11-15 PROCEDURE — G8484 FLU IMMUNIZE NO ADMIN: HCPCS | Performed by: NURSE PRACTITIONER

## 2022-11-15 PROCEDURE — 3078F DIAST BP <80 MM HG: CPT | Performed by: NURSE PRACTITIONER

## 2022-11-15 PROCEDURE — 1036F TOBACCO NON-USER: CPT | Performed by: NURSE PRACTITIONER

## 2022-11-15 PROCEDURE — 1123F ACP DISCUSS/DSCN MKR DOCD: CPT | Performed by: NURSE PRACTITIONER

## 2022-11-15 PROCEDURE — G8417 CALC BMI ABV UP PARAM F/U: HCPCS | Performed by: NURSE PRACTITIONER

## 2022-11-15 PROCEDURE — G8427 DOCREV CUR MEDS BY ELIG CLIN: HCPCS | Performed by: NURSE PRACTITIONER

## 2022-11-15 RX ORDER — LOSARTAN POTASSIUM 50 MG/1
50 TABLET ORAL DAILY
Qty: 90 TABLET | Refills: 1 | Status: SHIPPED | OUTPATIENT
Start: 2022-11-15

## 2022-11-15 NOTE — PROGRESS NOTES
Ta Silveira  : 1950  Encounter date: 11/15/2022    This jennifer 71 y.o. male who presents with  Chief Complaint   Patient presents with    Cough         History of present illness:    HPI Pt is nasal congestion started 2 weeks ago. Pt is concerned about ACE cough, reports previously being seen months ago for similar symptoms. Dry tickle in throat, persistent cough. Has had chest xray previously [facility]. Current Outpatient Medications on File Prior to Visit   Medication Sig Dispense Refill    simvastatin (ZOCOR) 40 MG tablet TAKE 1 TABLET BY MOUTH EVERY DAY EVERY NIGHT 90 tablet 1    metFORMIN (GLUCOPHAGE-XR) 500 MG extended release tablet TAKE 2 TABLETS BY MOUTH DAILY (WITH BREAKFAST). 180 tablet 2    Multiple Vitamins-Minerals (THERAPEUTIC MULTIVITAMIN-MINERALS) tablet Take 1 tablet by mouth daily      Cholecalciferol (VITAMIN D3) 1000 units TABS Take 1 tablet by mouth daily 30 tablet 0     No current facility-administered medications on file prior to visit.       Allergies   Allergen Reactions    No Known Allergies      Past Medical History:   Diagnosis Date    Hyperlipidemia     Hypertension     Type II or unspecified type diabetes mellitus without mention of complication, not stated as uncontrolled       Past Surgical History:   Procedure Laterality Date    FOOT SURGERY      repair of fallen arch and tendon repair    KNEE ARTHROSCOPY      Both knees R , L 2017    PROSTATECTOMY N/A 2022    ROBOTIC LAPAROSCOPIC RADICAL PROSTATECTOMY WITH BILATERAL LYMPH NODE DISSECTION AND BILATERAL NERVE SPARE performed by You Ontiveros MD at [address] [facility] Right 10/30/2013      Family History   Problem Relation Age of Onset    Breast Cancer Mother     Heart Attack Mother     Diabetes Maternal Aunt       Social History     Tobacco Use    Smoking status: Never    Smokeless tobacco: Never   Substance Use Topics    Alcohol use: No        Review of Systems    Objective:    /84   Pulse (!) 115 Temp 98.6 °F (37 °C) (Tympanic)   Wt 223 lb (101.2 kg)   SpO2 99%   BMI 30.24 kg/m²   Weight: 223 lb (101.2 kg)     BP Readings from Last 3 Encounters:   11/15/22 136/84   08/19/22 110/80   06/09/22 118/80     Wt Readings from Last 3 Encounters:   11/15/22 223 lb (101.2 kg)   08/19/22 222 lb (100.7 kg)   06/09/22 219 lb (99.3 kg)     BMI Readings from Last 3 Encounters:   11/15/22 30.24 kg/m²   08/19/22 30.11 kg/m²   06/09/22 29.70 kg/m²       Physical Exam  Vitals reviewed. Constitutional:       Appearance: Normal appearance. He is well-developed. HENT:      Right Ear: Tympanic membrane and ear canal normal.      Left Ear: Tympanic membrane and ear canal normal.   Cardiovascular:      Rate and Rhythm: Normal rate and regular rhythm. Pulses: Normal pulses. Heart sounds: Normal heart sounds. No murmur heard. Pulmonary:      Effort: Pulmonary effort is normal.      Breath sounds: Normal breath sounds. Skin:     General: Skin is warm and dry. Neurological:      Mental Status: He is alert and oriented to person, place, and time. Psychiatric:         Mood and Affect: Mood normal.       Assessment/Plan    1. Primary hypertension  Advised monitoring  - losartan (COZAAR) 50 MG tablet; Take 1 tablet by mouth daily  Dispense: 90 tablet; Refill: 1    Advised if sinus symptoms worsen, call office for sinusitis. Return if symptoms worsen or fail to improve, for unresolved symptoms. This dictation was generated by voice recognition computer software. Although all attempts are made to edit the dictation for accuracy, there may be errors in the transcription that are not intended.

## 2023-02-11 ENCOUNTER — HOSPITAL ENCOUNTER (OUTPATIENT)
Age: 73
Discharge: HOME | End: 2023-02-11
Payer: MEDICARE

## 2023-02-11 DIAGNOSIS — E11.9 TYPE 2 DIABETES MELLITUS WITHOUT COMPLICATION, WITHOUT LONG-TERM CURRENT USE OF INSULIN (HCC): ICD-10-CM

## 2023-02-11 DIAGNOSIS — E78.00 PURE HYPERCHOLESTEROLEMIA: ICD-10-CM

## 2023-02-11 LAB
A/G RATIO: 1.4 (ref 1.1–2.2)
ALBUMIN SERPL-MCNC: 4 G/DL (ref 3.4–5)
ALP BLD-CCNC: 59 U/L (ref 40–129)
ALT SERPL-CCNC: 38 U/L (ref 10–40)
ANION GAP SERPL CALCULATED.3IONS-SCNC: 12 MMOL/L (ref 3–16)
AST SERPL-CCNC: 28 U/L (ref 15–37)
BASOPHILS ABSOLUTE: 0 K/UL (ref 0–0.2)
BASOPHILS RELATIVE PERCENT: 0.7 %
BILIRUB SERPL-MCNC: 0.6 MG/DL (ref 0–1)
BUN BLDV-MCNC: 10 MG/DL (ref 7–20)
CALCIUM SERPL-MCNC: 9.1 MG/DL (ref 8.3–10.6)
CHLORIDE BLD-SCNC: 103 MMOL/L (ref 99–110)
CHOLESTEROL, FASTING: 145 MG/DL (ref 0–199)
CO2: 24 MMOL/L (ref 21–32)
CREAT SERPL-MCNC: 0.8 MG/DL (ref 0.8–1.3)
EOSINOPHILS ABSOLUTE: 0.5 K/UL (ref 0–0.6)
EOSINOPHILS RELATIVE PERCENT: 7.1 %
GFR SERPL CREATININE-BSD FRML MDRD: >60 ML/MIN/{1.73_M2}
GLUCOSE FASTING: 160 MG/DL (ref 70–99)
HCT VFR BLD CALC: 43.4 % (ref 40.5–52.5)
HDLC SERPL-MCNC: 63 MG/DL (ref 40–60)
HEMOGLOBIN: 14.3 G/DL (ref 13.5–17.5)
LDL CHOLESTEROL CALCULATED: 57 MG/DL
LYMPHOCYTES ABSOLUTE: 1.5 K/UL (ref 1–5.1)
LYMPHOCYTES RELATIVE PERCENT: 23.1 %
MCH RBC QN AUTO: 30.2 PG (ref 26–34)
MCHC RBC AUTO-ENTMCNC: 33 G/DL (ref 31–36)
MCV RBC AUTO: 91.7 FL (ref 80–100)
MONOCYTES ABSOLUTE: 0.8 K/UL (ref 0–1.3)
MONOCYTES RELATIVE PERCENT: 12.2 %
NEUTROPHILS ABSOLUTE: 3.7 K/UL (ref 1.7–7.7)
NEUTROPHILS RELATIVE PERCENT: 56.9 %
PDW BLD-RTO: 13.3 % (ref 12.4–15.4)
PLATELET # BLD: 221 K/UL (ref 135–450)
PMV BLD AUTO: 9.8 FL (ref 5–10.5)
POTASSIUM SERPL-SCNC: 4.2 MMOL/L (ref 3.5–5.1)
RBC # BLD: 4.73 M/UL (ref 4.2–5.9)
SODIUM BLD-SCNC: 139 MMOL/L (ref 136–145)
TOTAL PROTEIN: 6.9 G/DL (ref 6.4–8.2)
TRIGLYCERIDE, FASTING: 123 MG/DL (ref 0–150)
TSH REFLEX: 1.95 UIU/ML (ref 0.27–4.2)
VLDLC SERPL CALC-MCNC: 25 MG/DL
WBC # BLD: 6.5 K/UL (ref 4–11)

## 2023-02-11 PROCEDURE — 83036 HEMOGLOBIN GLYCOSYLATED A1C: CPT

## 2023-02-11 PROCEDURE — 84443 ASSAY THYROID STIM HORMONE: CPT

## 2023-02-11 PROCEDURE — 85025 COMPLETE CBC W/AUTO DIFF WBC: CPT

## 2023-02-11 PROCEDURE — 80053 COMPREHEN METABOLIC PANEL: CPT

## 2023-02-11 PROCEDURE — 36415 COLL VENOUS BLD VENIPUNCTURE: CPT

## 2023-02-11 PROCEDURE — 80061 LIPID PANEL: CPT

## 2023-02-12 LAB
ESTIMATED AVERAGE GLUCOSE: 165.7 MG/DL
HBA1C MFR BLD: 7.4 %

## 2023-02-23 ENCOUNTER — OFFICE VISIT (OUTPATIENT)
Dept: FAMILY MEDICINE CLINIC | Age: 73
End: 2023-02-23
Payer: MEDICARE

## 2023-02-23 VITALS
WEIGHT: 227 LBS | BODY MASS INDEX: 30.79 KG/M2 | SYSTOLIC BLOOD PRESSURE: 120 MMHG | DIASTOLIC BLOOD PRESSURE: 80 MMHG | TEMPERATURE: 97.4 F

## 2023-02-23 DIAGNOSIS — I10 PRIMARY HYPERTENSION: Primary | Chronic | ICD-10-CM

## 2023-02-23 DIAGNOSIS — E11.9 TYPE 2 DIABETES MELLITUS WITHOUT COMPLICATION, WITHOUT LONG-TERM CURRENT USE OF INSULIN (HCC): ICD-10-CM

## 2023-02-23 DIAGNOSIS — E78.00 PURE HYPERCHOLESTEROLEMIA: ICD-10-CM

## 2023-02-23 DIAGNOSIS — C61 PROSTATE CANCER (HCC): ICD-10-CM

## 2023-02-23 PROBLEM — I26.99 ACUTE PULMONARY EMBOLISM (HCC): Status: RESOLVED | Noted: 2022-05-27 | Resolved: 2023-02-23

## 2023-02-23 PROCEDURE — G8484 FLU IMMUNIZE NO ADMIN: HCPCS | Performed by: FAMILY MEDICINE

## 2023-02-23 PROCEDURE — 3051F HG A1C>EQUAL 7.0%<8.0%: CPT | Performed by: FAMILY MEDICINE

## 2023-02-23 PROCEDURE — 3079F DIAST BP 80-89 MM HG: CPT | Performed by: FAMILY MEDICINE

## 2023-02-23 PROCEDURE — G8427 DOCREV CUR MEDS BY ELIG CLIN: HCPCS | Performed by: FAMILY MEDICINE

## 2023-02-23 PROCEDURE — 3074F SYST BP LT 130 MM HG: CPT | Performed by: FAMILY MEDICINE

## 2023-02-23 PROCEDURE — 99214 OFFICE O/P EST MOD 30 MIN: CPT | Performed by: FAMILY MEDICINE

## 2023-02-23 PROCEDURE — 3017F COLORECTAL CA SCREEN DOC REV: CPT | Performed by: FAMILY MEDICINE

## 2023-02-23 PROCEDURE — 1123F ACP DISCUSS/DSCN MKR DOCD: CPT | Performed by: FAMILY MEDICINE

## 2023-02-23 PROCEDURE — 2022F DILAT RTA XM EVC RTNOPTHY: CPT | Performed by: FAMILY MEDICINE

## 2023-02-23 PROCEDURE — 1036F TOBACCO NON-USER: CPT | Performed by: FAMILY MEDICINE

## 2023-02-23 PROCEDURE — G8417 CALC BMI ABV UP PARAM F/U: HCPCS | Performed by: FAMILY MEDICINE

## 2023-02-23 RX ORDER — METFORMIN HYDROCHLORIDE 500 MG/1
1500 TABLET, EXTENDED RELEASE ORAL
Qty: 270 TABLET | Refills: 3 | Status: SHIPPED | OUTPATIENT
Start: 2023-02-23 | End: 2023-05-24

## 2023-02-23 SDOH — ECONOMIC STABILITY: FOOD INSECURITY: WITHIN THE PAST 12 MONTHS, YOU WORRIED THAT YOUR FOOD WOULD RUN OUT BEFORE YOU GOT MONEY TO BUY MORE.: NEVER TRUE

## 2023-02-23 SDOH — ECONOMIC STABILITY: HOUSING INSECURITY
IN THE LAST 12 MONTHS, WAS THERE A TIME WHEN YOU DID NOT HAVE A STEADY PLACE TO SLEEP OR SLEPT IN A SHELTER (INCLUDING NOW)?: NO

## 2023-02-23 SDOH — ECONOMIC STABILITY: INCOME INSECURITY: HOW HARD IS IT FOR YOU TO PAY FOR THE VERY BASICS LIKE FOOD, HOUSING, MEDICAL CARE, AND HEATING?: NOT HARD AT ALL

## 2023-02-23 SDOH — ECONOMIC STABILITY: FOOD INSECURITY: WITHIN THE PAST 12 MONTHS, THE FOOD YOU BOUGHT JUST DIDN'T LAST AND YOU DIDN'T HAVE MONEY TO GET MORE.: NEVER TRUE

## 2023-02-23 ASSESSMENT — ENCOUNTER SYMPTOMS
WHEEZING: 1
SINUS PRESSURE: 1
COUGH: 1
RHINORRHEA: 1
DIARRHEA: 0
CONSTIPATION: 0
BACK PAIN: 1

## 2023-02-23 ASSESSMENT — PATIENT HEALTH QUESTIONNAIRE - PHQ9
SUM OF ALL RESPONSES TO PHQ QUESTIONS 1-9: 0
2. FEELING DOWN, DEPRESSED OR HOPELESS: 0
SUM OF ALL RESPONSES TO PHQ QUESTIONS 1-9: 0
SUM OF ALL RESPONSES TO PHQ QUESTIONS 1-9: 0
SUM OF ALL RESPONSES TO PHQ9 QUESTIONS 1 & 2: 0
SUM OF ALL RESPONSES TO PHQ QUESTIONS 1-9: 0
1. LITTLE INTEREST OR PLEASURE IN DOING THINGS: 0

## 2023-02-23 NOTE — PROGRESS NOTES
SUBJECTIVE:    Corey Gutierrez is a 71 y.o. male who presents for a follow up visit. Chief Complaint   Patient presents with    Follow-up     Patient is here for a follow up. Discuss  lab. C/O sinus drainage/wheezing at night. Still has referral for colonoscopy.         b    Hyperlipidemia  This is a chronic problem. The current episode started more than 1 year ago. Lipid results: Total cholesterol 145, triglycerides 123, HDL 63, LDL 57. Exacerbating diseases include diabetes and obesity. Current antihyperlipidemic treatment includes statins. The current treatment provides significant improvement of lipids. Compliance problems include adherence to exercise and adherence to diet. Risk factors for coronary artery disease include diabetes mellitus, dyslipidemia, male sex, obesity, hypertension and a sedentary lifestyle. Hypertension  This is a chronic problem. The current episode started more than 1 year ago. The problem is controlled. Risk factors for coronary artery disease include diabetes mellitus, dyslipidemia, male gender, obesity and sedentary lifestyle. Past treatments include angiotensin blockers. The current treatment provides significant improvement. Compliance problems include exercise and diet. Diabetes  He presents for his follow-up diabetic visit. He has type 2 diabetes mellitus. Disease course: Hemoglobin A1c 7.4. Pertinent negatives for hypoglycemia include no dizziness or nervousness/anxiousness. Pertinent negatives for diabetes include no fatigue. There are no hypoglycemic complications. Risk factors for coronary artery disease include diabetes mellitus, dyslipidemia, male sex, obesity, sedentary lifestyle and hypertension. Current diabetic treatments: Metformin extended release tablets 2 tablets prior to breakfast. He is following a generally healthy diet. Meal planning includes avoidance of concentrated sweets. An ACE inhibitor/angiotensin II receptor blocker is being taken.    Back Pain  This is a chronic problem. The current episode started more than 1 year ago. The problem occurs constantly. The problem has been waxing and waning since onset. The pain is present in the lumbar spine. The pain radiates to the left thigh. The pain is moderate. The symptoms are aggravated by twisting, standing and sitting. Risk factors include sedentary lifestyle and recent trauma. He has tried NSAIDs (NIYA from [employer]) for the symptoms. Knee Pain   There was no injury mechanism. The pain is present in the left knee. The quality of the pain is described as aching. The pain has been Fluctuating since onset. The symptoms are aggravated by weight bearing. Treatments tried: Seeing Ortho. The treatment provided mild relief. Patient's medications, allergies, past medical,surgical, social and family histories were reviewed and updated as appropriate.      Past Medical History:   Diagnosis Date    Hyperlipidemia     Hypertension     Type II or unspecified type diabetes mellitus without mention of complication, not stated as uncontrolled      Past Surgical History:   Procedure Laterality Date    FOOT SURGERY      repair of fallen arch and tendon repair    KNEE ARTHROSCOPY      Both knees R 2007, L 2017    PROSTATECTOMY N/A 5/25/2022    ROBOTIC LAPAROSCOPIC RADICAL PROSTATECTOMY WITH BILATERAL LYMPH NODE DISSECTION AND BILATERAL NERVE SPARE performed by Uday Steve MD at [address] [place] [address] Right 10/30/2013     Family History   Problem Relation Age of Onset    Breast Cancer Mother     Heart Attack Mother     Diabetes Maternal Aunt      Social History     Tobacco Use    Smoking status: Never    Smokeless tobacco: Never   Substance Use Topics    Alcohol use: No      Allergies   Allergen Reactions    No Known Allergies      Current Outpatient Medications on File Prior to Visit   Medication Sig Dispense Refill    losartan (COZAAR) 50 MG tablet Take 1 tablet by mouth daily 90 tablet 1    simvastatin (ZOCOR) 40 MG tablet TAKE 1 TABLET BY MOUTH EVERY DAY EVERY NIGHT 90 tablet 1    Multiple Vitamins-Minerals (THERAPEUTIC MULTIVITAMIN-MINERALS) tablet Take 1 tablet by mouth daily      Cholecalciferol (VITAMIN D3) 1000 units TABS Take 1 tablet by mouth daily 30 tablet 0     No current facility-administered medications on file prior to visit. Review of Systems   Constitutional:  Negative for activity change and fatigue. HENT:  Positive for congestion, postnasal drip, rhinorrhea and sinus pressure. Respiratory:  Positive for cough and wheezing. Gastrointestinal:  Negative for constipation and diarrhea. Genitourinary:  Negative for difficulty urinating. Musculoskeletal:  Positive for arthralgias (knees) and back pain. Neurological:  Negative for dizziness. Psychiatric/Behavioral:  Negative for dysphoric mood and sleep disturbance. The patient is not nervous/anxious. OBJECTIVE:    /80   Temp 97.4 °F (36.3 °C)   Wt 227 lb (103 kg)   BMI 30.79 kg/m²    Physical Exam  Constitutional:       General: He is not in acute distress. Appearance: He is well-developed. He is obese. HENT:      Head: Normocephalic and atraumatic. Right Ear: Tympanic membrane and external ear normal.      Left Ear: Tympanic membrane and external ear normal.      Nose: Nose normal.      Mouth/Throat:      Mouth: Mucous membranes are moist.      Pharynx: No posterior oropharyngeal erythema. Eyes:      General:         Right eye: No discharge. Conjunctiva/sclera: Conjunctivae normal.   Neck:      Thyroid: No thyromegaly. Vascular: No JVD. Trachea: No tracheal deviation. Cardiovascular:      Rate and Rhythm: Normal rate and regular rhythm. Heart sounds: Normal heart sounds. Pulmonary:      Effort: Pulmonary effort is normal. No respiratory distress. Breath sounds: Normal breath sounds. No rales. Musculoskeletal:      Cervical back: Normal range of motion and neck supple. Right lower leg: No edema. Left lower leg: No edema. Lymphadenopathy:      Cervical: No cervical adenopathy. Skin:     General: Skin is warm and dry. Neurological:      Mental Status: He is alert and oriented to person, place, and time. Psychiatric:         Mood and Affect: Mood normal.         Behavior: Behavior normal.       ASSESSMENT/PLAN:    Pavel Robbins was seen today for follow-up. Diagnoses and all orders for this visit:    Primary hypertension  Well-controlled with current medication  -     Comprehensive Metabolic Panel, Fasting; Future  -     CBC with Auto Differential; Future    Prostate cancer ([facility] [address]  Followed by urology. Doing well. Type 2 diabetes mellitus without complication, without long-term current use of insulin ([facility])  Most recent hemoglobin A1c 7.4. Patient would like to increase his metformin to 3 tablets daily and he is given the choice to take all 3 at once with breakfast or 2 in the morning and 1 in the evening.  -     metFORMIN (GLUCOPHAGE-XR) 500 MG extended release tablet; Take 3 tablets by mouth daily (with breakfast)  -     Hemoglobin A1C; Future  -     Microalbumin / Creatinine Urine Ratio; Future    Pure hypercholesterolemia  Doing well with most recent LDL 57 and HDL 63  -     Comprehensive Metabolic Panel, Fasting; Future  -     Lipid, Fasting; Future  -     CBC with Auto Differential; Future      Return in about 6 months (around 8/23/2023). Please note portions of this note were completed with a voicerecognition program.  Efforts were made to edit the dictations but occasionally words are mis-transcribed.

## 2023-03-27 ENCOUNTER — HOSPITAL ENCOUNTER (EMERGENCY)
Age: 73
Discharge: HOME | End: 2023-03-27
Attending: STUDENT IN AN ORGANIZED HEALTH CARE EDUCATION/TRAINING PROGRAM
Payer: MEDICARE

## 2023-03-27 VITALS
DIASTOLIC BLOOD PRESSURE: 75 MMHG | HEART RATE: 95 BPM | OXYGEN SATURATION: 95 % | TEMPERATURE: 98.2 F | RESPIRATION RATE: 16 BRPM | SYSTOLIC BLOOD PRESSURE: 143 MMHG

## 2023-03-27 DIAGNOSIS — L02.416 ABSCESS OF LEFT THIGH: Primary | ICD-10-CM

## 2023-03-27 PROCEDURE — 99283 EMERGENCY DEPT VISIT LOW MDM: CPT

## 2023-03-27 PROCEDURE — 10061 I&D ABSCESS COMP/MULTIPLE: CPT

## 2023-03-27 RX ORDER — SULFAMETHOXAZOLE AND TRIMETHOPRIM 800; 160 MG/1; MG/1
1 TABLET ORAL 2 TIMES DAILY
Qty: 20 TABLET | Refills: 0 | Status: SHIPPED | OUTPATIENT
Start: 2023-03-27 | End: 2023-04-06

## 2023-03-27 RX ORDER — CEPHALEXIN 500 MG/1
500 CAPSULE ORAL 4 TIMES DAILY
Qty: 40 CAPSULE | Refills: 0 | Status: SHIPPED | OUTPATIENT
Start: 2023-03-27 | End: 2023-04-06

## 2023-03-27 ASSESSMENT — LIFESTYLE VARIABLES
HOW MANY STANDARD DRINKS CONTAINING ALCOHOL DO YOU HAVE ON A TYPICAL DAY: PATIENT DOES NOT DRINK
HOW OFTEN DO YOU HAVE A DRINK CONTAINING ALCOHOL: NEVER

## 2023-03-27 NOTE — ED PROVIDER NOTES
PER TABLET    Take 1 tablet by mouth 2 times daily for 10 days       Controlled Substances Monitoring:    If the patient was prescribed a controlled substance today, the PDMP was reviewed as documented below. No flowsheet data found.     (Please note that portions of this note were completed with a voice recognition program.  Efforts were made to edit the dictations but occasionally words are mis-transcribed.)    Florentin Gomez MD (electronically signed)  Attending Emergency Physician            Desire Lowery MD  03/27/23 [id number]

## 2023-03-30 ENCOUNTER — OFFICE VISIT (OUTPATIENT)
Dept: SURGERY | Age: 73
End: 2023-03-30
Payer: MEDICARE

## 2023-03-30 VITALS — WEIGHT: 221 LBS | SYSTOLIC BLOOD PRESSURE: 129 MMHG | BODY MASS INDEX: 29.97 KG/M2 | DIASTOLIC BLOOD PRESSURE: 78 MMHG

## 2023-03-30 DIAGNOSIS — L02.416 ABSCESS OF LEFT THIGH: Primary | ICD-10-CM

## 2023-03-30 PROCEDURE — 3074F SYST BP LT 130 MM HG: CPT | Performed by: SURGERY

## 2023-03-30 PROCEDURE — G8484 FLU IMMUNIZE NO ADMIN: HCPCS | Performed by: SURGERY

## 2023-03-30 PROCEDURE — G8417 CALC BMI ABV UP PARAM F/U: HCPCS | Performed by: SURGERY

## 2023-03-30 PROCEDURE — 3078F DIAST BP <80 MM HG: CPT | Performed by: SURGERY

## 2023-03-30 PROCEDURE — 1123F ACP DISCUSS/DSCN MKR DOCD: CPT | Performed by: SURGERY

## 2023-03-30 PROCEDURE — 99202 OFFICE O/P NEW SF 15 MIN: CPT | Performed by: SURGERY

## 2023-03-30 PROCEDURE — 1036F TOBACCO NON-USER: CPT | Performed by: SURGERY

## 2023-03-30 PROCEDURE — 3017F COLORECTAL CA SCREEN DOC REV: CPT | Performed by: SURGERY

## 2023-03-30 PROCEDURE — G8427 DOCREV CUR MEDS BY ELIG CLIN: HCPCS | Performed by: SURGERY

## 2023-03-30 NOTE — PROGRESS NOTES
Torri Romero is a 71 y.o. male     CC: Abscess left medial thigh    HPI: 43-year-old male who is seen in the ED 3 days ago for an abscess of the left thigh. He underwent I&D and then was discharged home on Bactrim and Keflex. The erythema has regressed according to the patient. No history of similar problems in this area in the past.      Past Medical History:   Diagnosis Date    Hyperlipidemia     Hypertension     Type II or unspecified type diabetes mellitus without mention of complication, not stated as uncontrolled        No known allergies     Past Surgical History:   Procedure Laterality Date    FOOT SURGERY      repair of fallen arch and tendon repair    KNEE ARTHROSCOPY      Both knees R 2007, L 2017    PROSTATECTOMY N/A 5/25/2022    ROBOTIC LAPAROSCOPIC RADICAL PROSTATECTOMY WITH BILATERAL LYMPH NODE DISSECTION AND BILATERAL NERVE SPARE performed by Josie Craft MD at [address] [place] [address] Right 10/30/2013        Prior to Visit Medications    Medication Sig Taking?  Authorizing Provider   sulfamethoxazole-trimethoprim (BACTRIM DS) 800-160 MG per tablet Take 1 tablet by mouth 2 times daily for 10 days Yes Jl Turner MD   cephALEXin (KEFLEX) 500 MG capsule Take 1 capsule by mouth 4 times daily for 10 days Yes Jl Turner MD   metFORMIN (GLUCOPHAGE-XR) 500 MG extended release tablet Take 3 tablets by mouth daily (with breakfast) Yes Ibeth Thompson MD   losartan (COZAAR) 50 MG tablet Take 1 tablet by mouth daily Yes Julio Cesar Nearing, JUVENCIO - NP   simvastatin (ZOCOR) 40 MG tablet TAKE 1 TABLET BY MOUTH EVERY DAY EVERY NIGHT Yes Ibeth Coley., MD   Multiple Vitamins-Minerals (THERAPEUTIC MULTIVITAMIN-MINERALS) tablet Take 1 tablet by mouth daily Yes Historical Provider, MD   Cholecalciferol (VITAMIN D3) 1000 units TABS Take 1 tablet by mouth daily Yes Sd Yanes MD       Social History     Socioeconomic History    Marital status:

## 2023-03-30 NOTE — LETTER
[address] [address]  Phone: [phone]  Fax: [phone]    March 30, 2023    Patient: Juan Pablo Mcpherson  MRN:  [id number]  YOB: 1950  Date of Visit: 3/30/2023    Dear Dr Ev Hill: Thank you for the request for consultation for [facility]. Below are the relevant portions of my assessment and plan of care. Assessment:  58-year-old male status post I&D of a left thigh abscess in the ED 3 days ago. He is currently taking Bactrim and Keflex. The packing was removed. There is an approximately 6 x 4 cm area of induration surrounding the I&D site. The I&D site is open to drainage and there are no findings to suggest further undrained fluid. There is some residual erythema which has markedly improved since his visit to the ED. Plan:  Wash with antibacterial soap  Dry bandage over area  Follow-up in 2 weeks      If you have questions, please do not hesitate to call me. I look forward to following [facility] along with you.     Sincerely,    Ramandeep Riley MD    CC providers:    Jon Chavez MD  [facility] [address] [address]  [address] [place] [address]., MD  [address] [place] [address]

## 2023-04-17 ENCOUNTER — OFFICE VISIT (OUTPATIENT)
Dept: SURGERY | Age: 73
End: 2023-04-17
Payer: MEDICARE

## 2023-04-17 VITALS — SYSTOLIC BLOOD PRESSURE: 140 MMHG | WEIGHT: 228 LBS | BODY MASS INDEX: 30.92 KG/M2 | DIASTOLIC BLOOD PRESSURE: 83 MMHG

## 2023-04-17 DIAGNOSIS — L02.416 ABSCESS OF LEFT THIGH: Primary | ICD-10-CM

## 2023-04-17 PROCEDURE — 1123F ACP DISCUSS/DSCN MKR DOCD: CPT | Performed by: SURGERY

## 2023-04-17 PROCEDURE — 3017F COLORECTAL CA SCREEN DOC REV: CPT | Performed by: SURGERY

## 2023-04-17 PROCEDURE — G8417 CALC BMI ABV UP PARAM F/U: HCPCS | Performed by: SURGERY

## 2023-04-17 PROCEDURE — 99212 OFFICE O/P EST SF 10 MIN: CPT | Performed by: SURGERY

## 2023-04-17 PROCEDURE — 3079F DIAST BP 80-89 MM HG: CPT | Performed by: SURGERY

## 2023-04-17 PROCEDURE — 1036F TOBACCO NON-USER: CPT | Performed by: SURGERY

## 2023-04-17 PROCEDURE — G8427 DOCREV CUR MEDS BY ELIG CLIN: HCPCS | Performed by: SURGERY

## 2023-04-17 PROCEDURE — 3077F SYST BP >= 140 MM HG: CPT | Performed by: SURGERY

## 2023-04-17 NOTE — PROGRESS NOTES
Subjective:      Chief complaint-abscess of the left thigh    Patient ID: HPI-Rosalino Alfaro is a 71 y.o. male seen in follow-up for an abscess of the left thigh    [facility]    Review of Systems    Objective:   Physical Exam  Constitutional:       Appearance: He is well-developed. HENT:      Head: Normocephalic and atraumatic. Right Ear: External ear normal.      Left Ear: External ear normal.   Eyes:      Conjunctiva/sclera: Conjunctivae normal.   Musculoskeletal:         General: Normal range of motion. Cervical back: Normal range of motion and neck supple. Skin:     General: Skin is warm and dry. Neurological:      Mental Status: He is alert and oriented to person, place, and time. Psychiatric:         Behavior: Behavior normal.       Assessment:      71-year-old male seen in follow-up for an abscess of the left medial thigh. The I&D site has healed down to a dry eschar. There is no cellulitis and only very minimal residual induration which should continue to resolve with time.       Plan:      Wash with antibacterial soap  Follow-up as needed        Berenice Stearns MD

## 2023-04-27 RX ORDER — SIMVASTATIN 40 MG
TABLET ORAL
Qty: 90 TABLET | Refills: 1 | Status: SHIPPED | OUTPATIENT
Start: 2023-04-27

## 2023-05-02 DIAGNOSIS — I10 PRIMARY HYPERTENSION: ICD-10-CM

## 2023-05-02 RX ORDER — LOSARTAN POTASSIUM 50 MG/1
TABLET ORAL
Qty: 90 TABLET | Refills: 1 | Status: SHIPPED | OUTPATIENT
Start: 2023-05-02

## 2023-05-16 ENCOUNTER — INITIAL CONSULT (OUTPATIENT)
Dept: SURGERY | Age: 73
End: 2023-05-16
Payer: MEDICARE

## 2023-05-16 VITALS
SYSTOLIC BLOOD PRESSURE: 122 MMHG | HEIGHT: 72 IN | BODY MASS INDEX: 30.75 KG/M2 | WEIGHT: 227 LBS | DIASTOLIC BLOOD PRESSURE: 82 MMHG

## 2023-05-16 DIAGNOSIS — N49.2 SCROTAL ABSCESS: ICD-10-CM

## 2023-05-16 PROCEDURE — 1036F TOBACCO NON-USER: CPT | Performed by: SURGERY

## 2023-05-16 PROCEDURE — 1123F ACP DISCUSS/DSCN MKR DOCD: CPT | Performed by: SURGERY

## 2023-05-16 PROCEDURE — G8417 CALC BMI ABV UP PARAM F/U: HCPCS | Performed by: SURGERY

## 2023-05-16 PROCEDURE — G8427 DOCREV CUR MEDS BY ELIG CLIN: HCPCS | Performed by: SURGERY

## 2023-05-16 PROCEDURE — 99213 OFFICE O/P EST LOW 20 MIN: CPT | Performed by: SURGERY

## 2023-05-16 PROCEDURE — 3079F DIAST BP 80-89 MM HG: CPT | Performed by: SURGERY

## 2023-05-16 PROCEDURE — 3017F COLORECTAL CA SCREEN DOC REV: CPT | Performed by: SURGERY

## 2023-05-16 PROCEDURE — 3074F SYST BP LT 130 MM HG: CPT | Performed by: SURGERY

## 2023-05-16 RX ORDER — AMOXICILLIN AND CLAVULANATE POTASSIUM 875; 125 MG/1; MG/1
1 TABLET, FILM COATED ORAL 2 TIMES DAILY
Qty: 20 TABLET | Refills: 0 | Status: SHIPPED | OUTPATIENT
Start: 2023-05-16 | End: 2023-05-26

## 2023-05-16 ASSESSMENT — ENCOUNTER SYMPTOMS
COLOR CHANGE: 0
EYE DISCHARGE: 0
APNEA: 0
BACK PAIN: 0
EYE ITCHING: 0
ABDOMINAL DISTENTION: 0
CHEST TIGHTNESS: 0
ABDOMINAL PAIN: 0

## 2023-05-16 NOTE — PROGRESS NOTES
MULTIVITAMIN-MINERALS) tablet Take 1 tablet by mouth daily      Cholecalciferol (VITAMIN D3) 1000 units TABS Take 1 tablet by mouth daily 30 tablet 0     No current facility-administered medications for this visit. Allergies   Allergen Reactions    No Known Allergies         OBJECTIVE:  /82   Ht 6' (1.829 m)   Wt 227 lb (103 kg)   BMI 30.79 kg/m²    Physical Exam  Vitals reviewed. Constitutional:       Appearance: He is well-developed. HENT:      Head: Normocephalic and atraumatic. Eyes:      Conjunctiva/sclera: Conjunctivae normal.      Pupils: Pupils are equal, round, and reactive to light. Genitourinary:      Skin:     General: Skin is warm and dry. Neurological:      Mental Status: He is alert and oriented to person, place, and time. Labs:  Orders Only on 05/15/2023   Component Date Value Ref Range Status    PSA, TOTAL 05/15/2023 0.01  0.00 - 4.00 ng/mL Final    Comment: Scheduled: 5/15/2023    Test performed on [email or web address] chemiluminescent method         Imaging:  No results found. ASSESSMENT:  Scrotal abscess    PLAN:  Local wound care with dry dressing, warm compresses  May shower normally  Primary pain control with tylenol and ibuprofen with food  Continue current antibiotic regimen. Does not need additional or adjustment in antibiotics   If wound does not improve in the next week, return to office for a wound check. If improves, return to office as needed    Gerda Arteaga.  Virgil Juan MD, FACS  5/16/2023  2:35 PM

## 2023-05-16 NOTE — PATIENT INSTRUCTIONS
Local wound care with dry dressing, warm compresses  May shower normally  Primary pain control with tylenol and ibuprofen with food  Continue current antibiotic regimen. Does not need additional or adjustment in antibiotics   If wound does not improve in the next week, return to office for a wound check.  If improves, return to office as needed

## 2023-08-16 ENCOUNTER — HOSPITAL ENCOUNTER (OUTPATIENT)
Age: 73
Discharge: HOME | End: 2023-08-16
Payer: MEDICARE

## 2023-08-16 DIAGNOSIS — E11.9 TYPE 2 DIABETES MELLITUS WITHOUT COMPLICATION, WITHOUT LONG-TERM CURRENT USE OF INSULIN (HCC): ICD-10-CM

## 2023-08-16 DIAGNOSIS — E78.00 PURE HYPERCHOLESTEROLEMIA: ICD-10-CM

## 2023-08-16 DIAGNOSIS — I10 PRIMARY HYPERTENSION: Chronic | ICD-10-CM

## 2023-08-16 LAB
ALBUMIN SERPL-MCNC: 4.4 G/DL (ref 3.4–5)
ALBUMIN/GLOB SERPL: 1.6 {RATIO} (ref 1.1–2.2)
ALP SERPL-CCNC: 62 U/L (ref 40–129)
ALT SERPL-CCNC: 33 U/L (ref 10–40)
ANION GAP SERPL CALCULATED.3IONS-SCNC: 11 MMOL/L (ref 3–16)
AST SERPL-CCNC: 26 U/L (ref 15–37)
BASOPHILS # BLD: 0 K/UL (ref 0–0.2)
BASOPHILS NFR BLD: 0.7 %
BILIRUB SERPL-MCNC: 0.6 MG/DL (ref 0–1)
BUN SERPL-MCNC: 11 MG/DL (ref 7–20)
CALCIUM SERPL-MCNC: 9.2 MG/DL (ref 8.3–10.6)
CHLORIDE SERPL-SCNC: 105 MMOL/L (ref 99–110)
CHOLEST SERPL-MCNC: 134 MG/DL (ref 0–199)
CO2 SERPL-SCNC: 24 MMOL/L (ref 21–32)
CREAT SERPL-MCNC: 0.7 MG/DL (ref 0.8–1.3)
CREAT UR-MCNC: 161.8 MG/DL (ref 39–259)
DEPRECATED RDW RBC AUTO: 13.7 % (ref 12.4–15.4)
EOSINOPHIL # BLD: 0.4 K/UL (ref 0–0.6)
EOSINOPHIL NFR BLD: 7.1 %
GFR SERPLBLD CREATININE-BSD FMLA CKD-EPI: >60 ML/MIN/{1.73_M2}
GLUCOSE P FAST SERPL-MCNC: 146 MG/DL (ref 70–99)
HCT VFR BLD AUTO: 42.4 % (ref 40.5–52.5)
HDLC SERPL-MCNC: 63 MG/DL (ref 40–60)
HGB BLD-MCNC: 14.6 G/DL (ref 13.5–17.5)
LDL CHOLESTEROL CALCULATED: 47 MG/DL
LYMPHOCYTES # BLD: 1.6 K/UL (ref 1–5.1)
LYMPHOCYTES NFR BLD: 25.2 %
MCH RBC QN AUTO: 31.2 PG (ref 26–34)
MCHC RBC AUTO-ENTMCNC: 34.4 G/DL (ref 31–36)
MCV RBC AUTO: 90.7 FL (ref 80–100)
MICROALBUMIN UR DL<=1MG/L-MCNC: <1.2 MG/DL
MICROALBUMIN/CREAT UR: NORMAL MG/G (ref 0–30)
MONOCYTES # BLD: 0.6 K/UL (ref 0–1.3)
MONOCYTES NFR BLD: 10 %
NEUTROPHILS # BLD: 3.5 K/UL (ref 1.7–7.7)
NEUTROPHILS NFR BLD: 57 %
PLATELET # BLD AUTO: 220 K/UL (ref 135–450)
PMV BLD AUTO: 9.8 FL (ref 5–10.5)
POTASSIUM SERPL-SCNC: 4.3 MMOL/L (ref 3.5–5.1)
PROT SERPL-MCNC: 7.1 G/DL (ref 6.4–8.2)
RBC # BLD AUTO: 4.67 M/UL (ref 4.2–5.9)
SODIUM SERPL-SCNC: 140 MMOL/L (ref 136–145)
TRIGL SERPL-MCNC: 119 MG/DL (ref 0–150)
VLDLC SERPL CALC-MCNC: 24 MG/DL
WBC # BLD AUTO: 6.2 K/UL (ref 4–11)

## 2023-08-16 PROCEDURE — 85025 COMPLETE CBC W/AUTO DIFF WBC: CPT

## 2023-08-16 PROCEDURE — 83036 HEMOGLOBIN GLYCOSYLATED A1C: CPT

## 2023-08-16 PROCEDURE — 36415 COLL VENOUS BLD VENIPUNCTURE: CPT

## 2023-08-16 PROCEDURE — 80053 COMPREHEN METABOLIC PANEL: CPT

## 2023-08-16 PROCEDURE — 80061 LIPID PANEL: CPT

## 2023-08-16 PROCEDURE — 82570 ASSAY OF URINE CREATININE: CPT

## 2023-08-16 PROCEDURE — 82043 UR ALBUMIN QUANTITATIVE: CPT

## 2023-08-17 LAB
EST. AVERAGE GLUCOSE BLD GHB EST-MCNC: 139.9 MG/DL
HBA1C MFR BLD: 6.5 %

## 2023-08-20 NOTE — PROGRESS NOTES
SUBJECTIVE:    Aster Acuña is a 68 y.o. male who presents for a follow up visit. Chief Complaint   Patient presents with    Diabetes     Discuss labs. Hypertension        Hyperlipidemia  This is a chronic problem. The current episode started more than 1 year ago. Lipid results: Total cholesterol 134, triglycerides 119, HDL 63, LDL 47. Pertinent negatives include no chest pain or shortness of breath. Current antihyperlipidemic treatment includes statins. The current treatment provides significant improvement of lipids. Compliance problems include adherence to exercise and adherence to diet. Risk factors for coronary artery disease include diabetes mellitus, dyslipidemia, male sex, hypertension and a sedentary lifestyle. Hypertension  This is a chronic problem. The current episode started more than 1 year ago. The problem is controlled. Pertinent negatives include no chest pain, headaches, palpitations or shortness of breath. Risk factors for coronary artery disease include diabetes mellitus, dyslipidemia, male gender, obesity and sedentary lifestyle. Past treatments include angiotensin blockers. The current treatment provides significant improvement. Compliance problems include exercise and diet. Diabetes  He presents for his follow-up diabetic visit. He has type 2 diabetes mellitus. Disease course: Hemoglobin A1c 6.5. There are no hypoglycemic associated symptoms. Pertinent negatives for hypoglycemia include no dizziness, headaches or nervousness/anxiousness. Pertinent negatives for diabetes include no chest pain and no fatigue. There are no hypoglycemic complications. Risk factors for coronary artery disease include diabetes mellitus, dyslipidemia, hypertension, male sex, obesity and sedentary lifestyle. Current diabetic treatments: Metformin extended release tablets 3 tablets daily with breakfast. His weight is stable. He is following a generally healthy diet.  Meal planning includes avoidance of

## 2023-08-23 ENCOUNTER — OFFICE VISIT (OUTPATIENT)
Dept: FAMILY MEDICINE CLINIC | Age: 73
End: 2023-08-23

## 2023-08-23 VITALS
OXYGEN SATURATION: 96 % | TEMPERATURE: 98.9 F | SYSTOLIC BLOOD PRESSURE: 116 MMHG | BODY MASS INDEX: 30.52 KG/M2 | WEIGHT: 225 LBS | RESPIRATION RATE: 16 BRPM | HEART RATE: 75 BPM | DIASTOLIC BLOOD PRESSURE: 72 MMHG

## 2023-08-23 DIAGNOSIS — E78.00 PURE HYPERCHOLESTEROLEMIA: ICD-10-CM

## 2023-08-23 DIAGNOSIS — I10 PRIMARY HYPERTENSION: Primary | Chronic | ICD-10-CM

## 2023-08-23 DIAGNOSIS — E11.9 TYPE 2 DIABETES MELLITUS WITHOUT COMPLICATION, WITHOUT LONG-TERM CURRENT USE OF INSULIN (HCC): ICD-10-CM

## 2023-08-23 ASSESSMENT — ENCOUNTER SYMPTOMS
CONSTIPATION: 0
BACK PAIN: 0
SHORTNESS OF BREATH: 0
CHEST TIGHTNESS: 0
DIARRHEA: 0
RHINORRHEA: 1

## 2023-10-22 DIAGNOSIS — I10 PRIMARY HYPERTENSION: ICD-10-CM

## 2023-10-24 RX ORDER — SIMVASTATIN 40 MG
TABLET ORAL
Qty: 90 TABLET | Refills: 1 | Status: SHIPPED | OUTPATIENT
Start: 2023-10-24

## 2023-10-24 RX ORDER — LOSARTAN POTASSIUM 50 MG/1
TABLET ORAL
Qty: 90 TABLET | Refills: 1 | Status: SHIPPED | OUTPATIENT
Start: 2023-10-24

## 2024-01-18 NOTE — PROGRESS NOTES
Preoperative Consultation    Rosalino Cooley  YOB: 1950    Mr. Cooley presents to the office today for a preoperative consultation at the request of surgeon, Dr. Sav Rossi MD PhD, who plans on performing phacoemulsification with intraocular lens implant OS on January 29, 2024.      Planned anesthesia: Topical anesthesia/ MAC  Known anesthesia problems: None   Bleeding risk: No recent or remote history of abnormal bleeding  Personal or FH of DVT/PE: No      Patient Active Problem List   Diagnosis    Pure hypercholesterolemia    HTN (hypertension)    Type 2 diabetes mellitus without complication, without long-term current use of insulin (HCC)    Prostate cancer (HCC)    Postoperative ileus (HCC)    Ileus (HCC)    Scrotal abscess     Past Surgical History:   Procedure Laterality Date    FOOT SURGERY      repair of fallen arch and tendon repair    KNEE ARTHROSCOPY      Both knees R 2007, L 2017    PROSTATECTOMY N/A 5/25/2022    ROBOTIC LAPAROSCOPIC RADICAL PROSTATECTOMY WITH BILATERAL LYMPH NODE DISSECTION AND BILATERAL NERVE SPARE performed by Mitchell Godinez MD at [facility] OR    SHOULDER SURGERY Right 10/30/2013       No Known Allergies    Outpatient Medications Marked as Taking for the 1/19/24 encounter (Office Visit) with Everardo Quintero Jr., MD   Medication Sig Dispense Refill    prednisoLONE acetate (PRED FORTE) 1 % ophthalmic suspension       ofloxacin (OCUFLOX) 0.3 % solution       ketorolac (ACULAR) 0.5 % ophthalmic solution       losartan (COZAAR) 50 MG tablet TAKE 1 TABLET BY MOUTH EVERY DAY 90 tablet 1    simvastatin (ZOCOR) 40 MG tablet TAKE 1 TABLET BY MOUTH EVERY DAY EVERY NIGHT 90 tablet 1    metFORMIN (GLUCOPHAGE-XR) 500 MG extended release tablet Take 3 tablets by mouth daily (with breakfast) 270 tablet 3    Multiple Vitamins-Minerals (THERAPEUTIC MULTIVITAMIN-MINERALS) tablet Take 1 tablet by mouth daily      Cholecalciferol (VITAMIN D3) 1000 units TABS Take 1 tablet by mouth

## 2024-01-19 ENCOUNTER — OFFICE VISIT (OUTPATIENT)
Dept: FAMILY MEDICINE CLINIC | Age: 74
End: 2024-01-19

## 2024-01-19 VITALS
TEMPERATURE: 98.2 F | DIASTOLIC BLOOD PRESSURE: 78 MMHG | SYSTOLIC BLOOD PRESSURE: 120 MMHG | RESPIRATION RATE: 16 BRPM | WEIGHT: 218 LBS | OXYGEN SATURATION: 95 % | HEART RATE: 89 BPM | BODY MASS INDEX: 29.57 KG/M2

## 2024-01-19 DIAGNOSIS — H25.9 SENILE CATARACT OF LEFT EYE, UNSPECIFIED AGE-RELATED CATARACT TYPE: Primary | ICD-10-CM

## 2024-01-19 DIAGNOSIS — Z01.818 PREOPERATIVE EXAMINATION: ICD-10-CM

## 2024-01-19 RX ORDER — PREDNISOLONE ACETATE 10 MG/ML
SUSPENSION/ DROPS OPHTHALMIC
COMMUNITY
Start: 2024-01-16

## 2024-01-19 RX ORDER — KETOROLAC TROMETHAMINE 5 MG/ML
SOLUTION OPHTHALMIC
COMMUNITY
Start: 2024-01-16

## 2024-01-19 RX ORDER — OFLOXACIN 3 MG/ML
SOLUTION/ DROPS OPHTHALMIC
COMMUNITY
Start: 2024-01-16

## 2024-01-19 ASSESSMENT — PATIENT HEALTH QUESTIONNAIRE - PHQ9
1. LITTLE INTEREST OR PLEASURE IN DOING THINGS: 0
SUM OF ALL RESPONSES TO PHQ QUESTIONS 1-9: 0
SUM OF ALL RESPONSES TO PHQ QUESTIONS 1-9: 0
2. FEELING DOWN, DEPRESSED OR HOPELESS: 0
SUM OF ALL RESPONSES TO PHQ9 QUESTIONS 1 & 2: 0
SUM OF ALL RESPONSES TO PHQ QUESTIONS 1-9: 0
SUM OF ALL RESPONSES TO PHQ QUESTIONS 1-9: 0

## 2024-01-19 ASSESSMENT — ENCOUNTER SYMPTOMS
BACK PAIN: 0
CHEST TIGHTNESS: 0
DIARRHEA: 0
SHORTNESS OF BREATH: 0
RHINORRHEA: 0
COUGH: 0
CONSTIPATION: 0

## 2024-02-10 ENCOUNTER — HOSPITAL ENCOUNTER (OUTPATIENT)
Age: 74
Discharge: HOME | End: 2024-02-10
Payer: MEDICARE

## 2024-02-10 DIAGNOSIS — E11.9 TYPE 2 DIABETES MELLITUS WITHOUT COMPLICATION, WITHOUT LONG-TERM CURRENT USE OF INSULIN (HCC): ICD-10-CM

## 2024-02-10 DIAGNOSIS — I10 PRIMARY HYPERTENSION: Chronic | ICD-10-CM

## 2024-02-10 DIAGNOSIS — E78.00 PURE HYPERCHOLESTEROLEMIA: ICD-10-CM

## 2024-02-10 PROCEDURE — 80053 COMPREHEN METABOLIC PANEL: CPT

## 2024-02-10 PROCEDURE — 85025 COMPLETE CBC W/AUTO DIFF WBC: CPT

## 2024-02-10 PROCEDURE — 83036 HEMOGLOBIN GLYCOSYLATED A1C: CPT

## 2024-02-10 PROCEDURE — 80061 LIPID PANEL: CPT

## 2024-02-10 PROCEDURE — 84443 ASSAY THYROID STIM HORMONE: CPT

## 2024-02-10 PROCEDURE — 36415 COLL VENOUS BLD VENIPUNCTURE: CPT

## 2024-02-11 LAB
ALBUMIN SERPL-MCNC: 4.4 G/DL (ref 3.4–5)
ALBUMIN/GLOB SERPL: 1.6 {RATIO} (ref 1.1–2.2)
ALP SERPL-CCNC: 58 U/L (ref 40–129)
ALT SERPL-CCNC: 31 U/L (ref 10–40)
ANION GAP SERPL CALCULATED.3IONS-SCNC: 9 MMOL/L (ref 3–16)
AST SERPL-CCNC: 22 U/L (ref 15–37)
BASOPHILS # BLD: 0.1 K/UL (ref 0–0.2)
BASOPHILS NFR BLD: 0.8 %
BILIRUB SERPL-MCNC: 0.8 MG/DL (ref 0–1)
BUN SERPL-MCNC: 14 MG/DL (ref 7–20)
CALCIUM SERPL-MCNC: 8.8 MG/DL (ref 8.3–10.6)
CHLORIDE SERPL-SCNC: 103 MMOL/L (ref 99–110)
CHOLEST SERPL-MCNC: 127 MG/DL (ref 0–199)
CO2 SERPL-SCNC: 27 MMOL/L (ref 21–32)
CREAT SERPL-MCNC: 0.8 MG/DL (ref 0.8–1.3)
DEPRECATED RDW RBC AUTO: 13.7 % (ref 12.4–15.4)
EOSINOPHIL # BLD: 0.4 K/UL (ref 0–0.6)
EOSINOPHIL NFR BLD: 6.2 %
EST. AVERAGE GLUCOSE BLD GHB EST-MCNC: 137 MG/DL
GFR SERPLBLD CREATININE-BSD FMLA CKD-EPI: >60 ML/MIN/{1.73_M2}
GLUCOSE P FAST SERPL-MCNC: 149 MG/DL (ref 70–99)
HBA1C MFR BLD: 6.4 %
HCT VFR BLD AUTO: 44.7 % (ref 40.5–52.5)
HDLC SERPL-MCNC: 57 MG/DL (ref 40–60)
HGB BLD-MCNC: 14.9 G/DL (ref 13.5–17.5)
LDL CHOLESTEROL CALCULATED: 50 MG/DL
LYMPHOCYTES # BLD: 1.9 K/UL (ref 1–5.1)
LYMPHOCYTES NFR BLD: 28.3 %
MCH RBC QN AUTO: 30.5 PG (ref 26–34)
MCHC RBC AUTO-ENTMCNC: 33.3 G/DL (ref 31–36)
MCV RBC AUTO: 91.6 FL (ref 80–100)
MONOCYTES # BLD: 0.7 K/UL (ref 0–1.3)
MONOCYTES NFR BLD: 11 %
NEUTROPHILS # BLD: 3.6 K/UL (ref 1.7–7.7)
NEUTROPHILS NFR BLD: 53.7 %
PLATELET # BLD AUTO: 232 K/UL (ref 135–450)
PLATELET BLD QL SMEAR: ADEQUATE
PMV BLD AUTO: 10.1 FL (ref 5–10.5)
POTASSIUM SERPL-SCNC: 4.3 MMOL/L (ref 3.5–5.1)
PROT SERPL-MCNC: 7.1 G/DL (ref 6.4–8.2)
RBC # BLD AUTO: 4.88 M/UL (ref 4.2–5.9)
RBC MORPH BLD: NORMAL
SLIDE REVIEW: NORMAL
SODIUM SERPL-SCNC: 139 MMOL/L (ref 136–145)
TRIGL SERPL-MCNC: 98 MG/DL (ref 0–150)
TSH SERPL DL<=0.005 MIU/L-ACNC: 2.63 UIU/ML (ref 0.27–4.2)
VLDLC SERPL CALC-MCNC: 20 MG/DL
WBC # BLD AUTO: 6.6 K/UL (ref 4–11)

## 2024-02-23 ENCOUNTER — OFFICE VISIT (OUTPATIENT)
Dept: FAMILY MEDICINE CLINIC | Age: 74
End: 2024-02-23

## 2024-02-23 VITALS
OXYGEN SATURATION: 96 % | RESPIRATION RATE: 16 BRPM | TEMPERATURE: 98.8 F | BODY MASS INDEX: 30.79 KG/M2 | WEIGHT: 227 LBS | HEART RATE: 86 BPM | SYSTOLIC BLOOD PRESSURE: 120 MMHG | DIASTOLIC BLOOD PRESSURE: 78 MMHG

## 2024-02-23 DIAGNOSIS — I10 PRIMARY HYPERTENSION: Chronic | ICD-10-CM

## 2024-02-23 DIAGNOSIS — E11.9 TYPE 2 DIABETES MELLITUS WITHOUT COMPLICATION, WITHOUT LONG-TERM CURRENT USE OF INSULIN (HCC): Primary | ICD-10-CM

## 2024-02-23 DIAGNOSIS — C61 PROSTATE CANCER (HCC): ICD-10-CM

## 2024-02-23 DIAGNOSIS — E78.00 PURE HYPERCHOLESTEROLEMIA: ICD-10-CM

## 2024-02-23 ASSESSMENT — ENCOUNTER SYMPTOMS
BACK PAIN: 0
SHORTNESS OF BREATH: 0
DIARRHEA: 0
CHEST TIGHTNESS: 0
RHINORRHEA: 1
CONSTIPATION: 0

## 2024-02-23 NOTE — PROGRESS NOTES
SUBJECTIVE:    Rosalino Cooley is a 73 y.o. male who presents for a follow up visit.    Chief Complaint   Patient presents with    Diabetes    Follow-up     Discuss labs.        Hyperlipidemia  This is a chronic problem. The current episode started more than 1 year ago. Lipid results: Total cholesterol 127, triglycerides 98, HDL 57, LDL 50. Exacerbating diseases include diabetes. Pertinent negatives include no chest pain or shortness of breath. Current antihyperlipidemic treatment includes statins. The current treatment provides significant improvement of lipids. Compliance problems include adherence to exercise and adherence to diet.  Risk factors for coronary artery disease include dyslipidemia, male sex, hypertension, diabetes mellitus and a sedentary lifestyle.   Hypertension  This is a chronic problem. The current episode started more than 1 year ago. The problem is controlled. Pertinent negatives include no chest pain, palpitations or shortness of breath. Risk factors for coronary artery disease include male gender, dyslipidemia, diabetes mellitus and sedentary lifestyle. Past treatments include angiotensin blockers. The current treatment provides significant improvement. Compliance problems include exercise and diet.    Diabetes  He presents for his follow-up diabetic visit. He has type 2 diabetes mellitus. Disease course: Hemoglobin A1c 6.4. There are no hypoglycemic associated symptoms. Pertinent negatives for hypoglycemia include no dizziness or nervousness/anxiousness. Pertinent negatives for diabetes include no chest pain and no fatigue. There are no hypoglycemic complications. Risk factors for coronary artery disease include diabetes mellitus, dyslipidemia, male sex, sedentary lifestyle and hypertension. Current diabetic treatments: Metformin extended release tablets 500 mg 3 tablets daily. His weight is stable. He is following a generally healthy diet. Meal planning includes avoidance of concentrated

## 2024-03-31 DIAGNOSIS — E11.9 TYPE 2 DIABETES MELLITUS WITHOUT COMPLICATION, WITHOUT LONG-TERM CURRENT USE OF INSULIN (HCC): ICD-10-CM

## 2024-04-01 RX ORDER — METFORMIN HYDROCHLORIDE 500 MG/1
1500 TABLET, EXTENDED RELEASE ORAL
Qty: 270 TABLET | Refills: 0 | Status: SHIPPED | OUTPATIENT
Start: 2024-04-01 | End: 2024-06-30

## 2024-04-21 DIAGNOSIS — I10 PRIMARY HYPERTENSION: ICD-10-CM

## 2024-04-22 RX ORDER — LOSARTAN POTASSIUM 50 MG/1
TABLET ORAL
Qty: 90 TABLET | Refills: 1 | Status: SHIPPED | OUTPATIENT
Start: 2024-04-22

## 2024-04-22 RX ORDER — SIMVASTATIN 40 MG
TABLET ORAL
Qty: 90 TABLET | Refills: 1 | Status: SHIPPED | OUTPATIENT
Start: 2024-04-22

## 2024-06-28 DIAGNOSIS — E11.9 TYPE 2 DIABETES MELLITUS WITHOUT COMPLICATION, WITHOUT LONG-TERM CURRENT USE OF INSULIN (HCC): ICD-10-CM

## 2024-06-28 RX ORDER — METFORMIN HYDROCHLORIDE 500 MG/1
1500 TABLET, EXTENDED RELEASE ORAL
Qty: 270 TABLET | Refills: 0 | Status: SHIPPED | OUTPATIENT
Start: 2024-06-28 | End: 2024-09-26

## 2024-08-10 ENCOUNTER — HOSPITAL ENCOUNTER (OUTPATIENT)
Age: 74
Discharge: HOME | End: 2024-08-10
Payer: MEDICARE

## 2024-08-10 DIAGNOSIS — I10 PRIMARY HYPERTENSION: Chronic | ICD-10-CM

## 2024-08-10 DIAGNOSIS — E78.00 PURE HYPERCHOLESTEROLEMIA: ICD-10-CM

## 2024-08-10 DIAGNOSIS — E11.9 TYPE 2 DIABETES MELLITUS WITHOUT COMPLICATION, WITHOUT LONG-TERM CURRENT USE OF INSULIN (HCC): ICD-10-CM

## 2024-08-10 LAB
ALBUMIN SERPL-MCNC: 4.3 G/DL (ref 3.4–5)
ALBUMIN/GLOB SERPL: 1.4 {RATIO} (ref 1.1–2.2)
ALP SERPL-CCNC: 54 U/L (ref 40–129)
ALT SERPL-CCNC: 22 U/L (ref 10–40)
ANION GAP SERPL CALCULATED.3IONS-SCNC: 13 MMOL/L (ref 3–16)
AST SERPL-CCNC: 24 U/L (ref 15–37)
BASOPHILS # BLD: 0 K/UL (ref 0–0.2)
BASOPHILS NFR BLD: 0.7 %
BILIRUB SERPL-MCNC: 0.8 MG/DL (ref 0–1)
BUN SERPL-MCNC: 13 MG/DL (ref 7–20)
CALCIUM SERPL-MCNC: 9.5 MG/DL (ref 8.3–10.6)
CHLORIDE SERPL-SCNC: 104 MMOL/L (ref 99–110)
CHOLEST SERPL-MCNC: 127 MG/DL (ref 0–199)
CO2 SERPL-SCNC: 24 MMOL/L (ref 21–32)
CREAT SERPL-MCNC: 0.8 MG/DL (ref 0.8–1.3)
DEPRECATED RDW RBC AUTO: 13.5 % (ref 12.4–15.4)
EOSINOPHIL # BLD: 0.3 K/UL (ref 0–0.6)
EOSINOPHIL NFR BLD: 5.2 %
GFR SERPLBLD CREATININE-BSD FMLA CKD-EPI: >90 ML/MIN/{1.73_M2}
GLUCOSE P FAST SERPL-MCNC: 135 MG/DL (ref 70–99)
HCT VFR BLD AUTO: 42.3 % (ref 40.5–52.5)
HDLC SERPL-MCNC: 59 MG/DL (ref 40–60)
HGB BLD-MCNC: 14.5 G/DL (ref 13.5–17.5)
LDL CHOLESTEROL: 47 MG/DL
LYMPHOCYTES # BLD: 1.5 K/UL (ref 1–5.1)
LYMPHOCYTES NFR BLD: 23.6 %
MCH RBC QN AUTO: 31.3 PG (ref 26–34)
MCHC RBC AUTO-ENTMCNC: 34.2 G/DL (ref 31–36)
MCV RBC AUTO: 91.4 FL (ref 80–100)
MONOCYTES # BLD: 0.7 K/UL (ref 0–1.3)
MONOCYTES NFR BLD: 11.1 %
NEUTROPHILS # BLD: 3.7 K/UL (ref 1.7–7.7)
NEUTROPHILS NFR BLD: 59.4 %
PLATELET # BLD AUTO: 204 K/UL (ref 135–450)
PMV BLD AUTO: 9.9 FL (ref 5–10.5)
POTASSIUM SERPL-SCNC: 4.4 MMOL/L (ref 3.5–5.1)
PROT SERPL-MCNC: 7.4 G/DL (ref 6.4–8.2)
RBC # BLD AUTO: 4.63 M/UL (ref 4.2–5.9)
SODIUM SERPL-SCNC: 141 MMOL/L (ref 136–145)
TRIGL SERPL-MCNC: 107 MG/DL (ref 0–150)
VLDLC SERPL CALC-MCNC: 21 MG/DL
WBC # BLD AUTO: 6.2 K/UL (ref 4–11)

## 2024-08-10 PROCEDURE — 85025 COMPLETE CBC W/AUTO DIFF WBC: CPT

## 2024-08-10 PROCEDURE — 80053 COMPREHEN METABOLIC PANEL: CPT

## 2024-08-10 PROCEDURE — 83036 HEMOGLOBIN GLYCOSYLATED A1C: CPT

## 2024-08-10 PROCEDURE — 36415 COLL VENOUS BLD VENIPUNCTURE: CPT

## 2024-08-10 PROCEDURE — 80061 LIPID PANEL: CPT

## 2024-08-11 LAB
EST. AVERAGE GLUCOSE BLD GHB EST-MCNC: 131.2 MG/DL
HBA1C MFR BLD: 6.2 %

## 2024-08-23 NOTE — PROGRESS NOTES
SUBJECTIVE:    Rosalino Cooley is a 74 y.o. male who presents for a follow up visit.    Chief Complaint   Patient presents with    Medicare AWV        Hyperlipidemia  This is a chronic problem. The current episode started more than 1 year ago. Lipid results: Total cholesterol 127, triglycerides 107, HDL 59, LDL 47. Exacerbating diseases include diabetes and obesity. Pertinent negatives include no chest pain or shortness of breath. Current antihyperlipidemic treatment includes statins. The current treatment provides significant improvement of lipids. Compliance problems include adherence to exercise and adherence to diet.  Risk factors for coronary artery disease include dyslipidemia, diabetes mellitus, hypertension, male sex, obesity and a sedentary lifestyle.   Hypertension  This is a chronic problem. The current episode started more than 1 year ago. The problem is controlled. Pertinent negatives include no chest pain, headaches, palpitations or shortness of breath. Risk factors for coronary artery disease include male gender, obesity, dyslipidemia, diabetes mellitus and sedentary lifestyle. Past treatments include angiotensin blockers. The current treatment provides significant improvement. Compliance problems include exercise and diet.    Diabetes  He presents for his follow-up diabetic visit. He has type 2 diabetes mellitus. Disease course: HgbA1C 6.2. There are no hypoglycemic associated symptoms. Pertinent negatives for hypoglycemia include no dizziness, headaches or nervousness/anxiousness. Pertinent negatives for diabetes include no chest pain and no fatigue. There are no hypoglycemic complications. There are no diabetic complications. Risk factors for coronary artery disease include diabetes mellitus, dyslipidemia, male sex, obesity, hypertension and sedentary lifestyle. Current diabetic treatments: Metformin  mg 3 tab daily. He is compliant with treatment all of the time. His weight is stable. He is

## 2024-08-24 SDOH — ECONOMIC STABILITY: FOOD INSECURITY: WITHIN THE PAST 12 MONTHS, THE FOOD YOU BOUGHT JUST DIDN'T LAST AND YOU DIDN'T HAVE MONEY TO GET MORE.: NEVER TRUE

## 2024-08-24 SDOH — ECONOMIC STABILITY: FOOD INSECURITY: WITHIN THE PAST 12 MONTHS, YOU WORRIED THAT YOUR FOOD WOULD RUN OUT BEFORE YOU GOT MONEY TO BUY MORE.: NEVER TRUE

## 2024-08-24 SDOH — HEALTH STABILITY: PHYSICAL HEALTH: ON AVERAGE, HOW MANY DAYS PER WEEK DO YOU ENGAGE IN MODERATE TO STRENUOUS EXERCISE (LIKE A BRISK WALK)?: 4 DAYS

## 2024-08-24 SDOH — HEALTH STABILITY: PHYSICAL HEALTH: ON AVERAGE, HOW MANY MINUTES DO YOU ENGAGE IN EXERCISE AT THIS LEVEL?: 30 MIN

## 2024-08-24 SDOH — ECONOMIC STABILITY: TRANSPORTATION INSECURITY
IN THE PAST 12 MONTHS, HAS LACK OF TRANSPORTATION KEPT YOU FROM MEETINGS, WORK, OR FROM GETTING THINGS NEEDED FOR DAILY LIVING?: NO

## 2024-08-24 SDOH — ECONOMIC STABILITY: INCOME INSECURITY: HOW HARD IS IT FOR YOU TO PAY FOR THE VERY BASICS LIKE FOOD, HOUSING, MEDICAL CARE, AND HEATING?: NOT HARD AT ALL

## 2024-08-24 ASSESSMENT — PATIENT HEALTH QUESTIONNAIRE - PHQ9
SUM OF ALL RESPONSES TO PHQ QUESTIONS 1-9: 0
SUM OF ALL RESPONSES TO PHQ QUESTIONS 1-9: 0
2. FEELING DOWN, DEPRESSED OR HOPELESS: NOT AT ALL
1. LITTLE INTEREST OR PLEASURE IN DOING THINGS: NOT AT ALL
SUM OF ALL RESPONSES TO PHQ9 QUESTIONS 1 & 2: 0
SUM OF ALL RESPONSES TO PHQ QUESTIONS 1-9: 0
SUM OF ALL RESPONSES TO PHQ QUESTIONS 1-9: 0

## 2024-08-24 ASSESSMENT — LIFESTYLE VARIABLES
HOW OFTEN DO YOU HAVE A DRINK CONTAINING ALCOHOL: 1
HOW MANY STANDARD DRINKS CONTAINING ALCOHOL DO YOU HAVE ON A TYPICAL DAY: PATIENT DOES NOT DRINK
HOW MANY STANDARD DRINKS CONTAINING ALCOHOL DO YOU HAVE ON A TYPICAL DAY: 0
HOW OFTEN DO YOU HAVE A DRINK CONTAINING ALCOHOL: NEVER
HOW OFTEN DO YOU HAVE SIX OR MORE DRINKS ON ONE OCCASION: 1

## 2024-08-27 ENCOUNTER — OFFICE VISIT (OUTPATIENT)
Dept: FAMILY MEDICINE CLINIC | Age: 74
End: 2024-08-27

## 2024-08-27 VITALS
HEIGHT: 72 IN | WEIGHT: 219.6 LBS | SYSTOLIC BLOOD PRESSURE: 124 MMHG | DIASTOLIC BLOOD PRESSURE: 70 MMHG | HEART RATE: 78 BPM | OXYGEN SATURATION: 97 % | BODY MASS INDEX: 29.74 KG/M2 | TEMPERATURE: 98.2 F

## 2024-08-27 DIAGNOSIS — Z00.00 MEDICARE ANNUAL WELLNESS VISIT, SUBSEQUENT: ICD-10-CM

## 2024-08-27 DIAGNOSIS — E78.00 PURE HYPERCHOLESTEROLEMIA: ICD-10-CM

## 2024-08-27 DIAGNOSIS — E11.9 TYPE 2 DIABETES MELLITUS WITHOUT COMPLICATION, WITHOUT LONG-TERM CURRENT USE OF INSULIN (HCC): Primary | ICD-10-CM

## 2024-08-27 DIAGNOSIS — I10 PRIMARY HYPERTENSION: Chronic | ICD-10-CM

## 2024-08-27 ASSESSMENT — ENCOUNTER SYMPTOMS
BACK PAIN: 0
CONSTIPATION: 0
DIARRHEA: 0
RHINORRHEA: 1
COUGH: 0
WHEEZING: 1
SHORTNESS OF BREATH: 0

## 2024-09-20 DIAGNOSIS — E11.9 TYPE 2 DIABETES MELLITUS WITHOUT COMPLICATION, WITHOUT LONG-TERM CURRENT USE OF INSULIN (HCC): ICD-10-CM

## 2024-09-20 RX ORDER — METFORMIN HCL 500 MG
1500 TABLET, EXTENDED RELEASE 24 HR ORAL
Qty: 270 TABLET | Refills: 1 | Status: SHIPPED | OUTPATIENT
Start: 2024-09-20 | End: 2024-12-19

## 2024-10-18 DIAGNOSIS — I10 PRIMARY HYPERTENSION: ICD-10-CM

## 2024-10-18 RX ORDER — LOSARTAN POTASSIUM 50 MG/1
TABLET ORAL
Qty: 90 TABLET | Refills: 1 | Status: SHIPPED | OUTPATIENT
Start: 2024-10-18

## 2024-10-21 RX ORDER — SIMVASTATIN 40 MG
TABLET ORAL
Qty: 90 TABLET | Refills: 1 | Status: SHIPPED | OUTPATIENT
Start: 2024-10-21

## 2025-02-18 ENCOUNTER — HOSPITAL ENCOUNTER (OUTPATIENT)
Age: 75
Discharge: HOME | End: 2025-02-18
Payer: MEDICARE

## 2025-02-18 DIAGNOSIS — I10 PRIMARY HYPERTENSION: ICD-10-CM

## 2025-02-18 DIAGNOSIS — E11.9 TYPE 2 DIABETES MELLITUS WITHOUT COMPLICATION, WITHOUT LONG-TERM CURRENT USE OF INSULIN (HCC): ICD-10-CM

## 2025-02-18 DIAGNOSIS — E78.00 PURE HYPERCHOLESTEROLEMIA: ICD-10-CM

## 2025-02-18 LAB
ALBUMIN SERPL-MCNC: 4.2 G/DL (ref 3.4–5)
ALBUMIN/GLOB SERPL: 1.4 {RATIO} (ref 1.1–2.2)
ALP SERPL-CCNC: 54 U/L (ref 40–129)
ALT SERPL-CCNC: 27 U/L (ref 10–40)
ANION GAP SERPL CALCULATED.3IONS-SCNC: 12 MMOL/L (ref 3–16)
AST SERPL-CCNC: 25 U/L (ref 15–37)
BASOPHILS # BLD: 0 K/UL (ref 0–0.2)
BASOPHILS NFR BLD: 0.5 %
BILIRUB SERPL-MCNC: 1 MG/DL (ref 0–1)
BUN SERPL-MCNC: 12 MG/DL (ref 7–20)
CALCIUM SERPL-MCNC: 9.3 MG/DL (ref 8.3–10.6)
CHLORIDE SERPL-SCNC: 102 MMOL/L (ref 99–110)
CHOLEST SERPL-MCNC: 126 MG/DL (ref 0–199)
CO2 SERPL-SCNC: 22 MMOL/L (ref 21–32)
CREAT SERPL-MCNC: 0.8 MG/DL (ref 0.8–1.3)
CREAT UR-MCNC: 261 MG/DL (ref 39–259)
DEPRECATED RDW RBC AUTO: 13.6 % (ref 12.4–15.4)
EOSINOPHIL # BLD: 0.4 K/UL (ref 0–0.6)
EOSINOPHIL NFR BLD: 6 %
EST. AVERAGE GLUCOSE BLD GHB EST-MCNC: 134.1 MG/DL
GFR SERPLBLD CREATININE-BSD FMLA CKD-EPI: >90 ML/MIN/{1.73_M2}
GLUCOSE P FAST SERPL-MCNC: 140 MG/DL (ref 70–99)
HBA1C MFR BLD: 6.3 %
HCT VFR BLD AUTO: 42.8 % (ref 40.5–52.5)
HDLC SERPL-MCNC: 57 MG/DL (ref 40–60)
HGB BLD-MCNC: 14.5 G/DL (ref 13.5–17.5)
LDL CHOLESTEROL: 45 MG/DL
LYMPHOCYTES # BLD: 1.6 K/UL (ref 1–5.1)
LYMPHOCYTES NFR BLD: 24.1 %
MCH RBC QN AUTO: 30.7 PG (ref 26–34)
MCHC RBC AUTO-ENTMCNC: 33.8 G/DL (ref 31–36)
MCV RBC AUTO: 91 FL (ref 80–100)
MICROALBUMIN UR DL<=1MG/L-MCNC: 4.1 MG/DL
MICROALBUMIN/CREAT UR: 15.7 MG/G (ref 0–30)
MONOCYTES # BLD: 0.8 K/UL (ref 0–1.3)
MONOCYTES NFR BLD: 11.2 %
NEUTROPHILS # BLD: 4 K/UL (ref 1.7–7.7)
NEUTROPHILS NFR BLD: 58.2 %
PLATELET # BLD AUTO: 193 K/UL (ref 135–450)
PMV BLD AUTO: 10.7 FL (ref 5–10.5)
POTASSIUM SERPL-SCNC: 4.1 MMOL/L (ref 3.5–5.1)
PROT SERPL-MCNC: 7.1 G/DL (ref 6.4–8.2)
RBC # BLD AUTO: 4.7 M/UL (ref 4.2–5.9)
SODIUM SERPL-SCNC: 136 MMOL/L (ref 136–145)
TRIGL SERPL-MCNC: 122 MG/DL (ref 0–150)
TSH SERPL DL<=0.005 MIU/L-ACNC: 3.3 UIU/ML (ref 0.27–4.2)
VLDLC SERPL CALC-MCNC: 24 MG/DL
WBC # BLD AUTO: 6.8 K/UL (ref 4–11)

## 2025-02-18 PROCEDURE — 80061 LIPID PANEL: CPT

## 2025-02-18 PROCEDURE — 82570 ASSAY OF URINE CREATININE: CPT

## 2025-02-18 PROCEDURE — 85025 COMPLETE CBC W/AUTO DIFF WBC: CPT

## 2025-02-18 PROCEDURE — 83036 HEMOGLOBIN GLYCOSYLATED A1C: CPT

## 2025-02-18 PROCEDURE — 80053 COMPREHEN METABOLIC PANEL: CPT

## 2025-02-18 PROCEDURE — 82043 UR ALBUMIN QUANTITATIVE: CPT

## 2025-02-18 PROCEDURE — 84443 ASSAY THYROID STIM HORMONE: CPT

## 2025-02-24 SDOH — ECONOMIC STABILITY: FOOD INSECURITY: WITHIN THE PAST 12 MONTHS, THE FOOD YOU BOUGHT JUST DIDN'T LAST AND YOU DIDN'T HAVE MONEY TO GET MORE.: NEVER TRUE

## 2025-02-24 SDOH — ECONOMIC STABILITY: FOOD INSECURITY: WITHIN THE PAST 12 MONTHS, YOU WORRIED THAT YOUR FOOD WOULD RUN OUT BEFORE YOU GOT MONEY TO BUY MORE.: NEVER TRUE

## 2025-02-24 SDOH — ECONOMIC STABILITY: TRANSPORTATION INSECURITY
IN THE PAST 12 MONTHS, HAS THE LACK OF TRANSPORTATION KEPT YOU FROM MEDICAL APPOINTMENTS OR FROM GETTING MEDICATIONS?: NO

## 2025-02-24 SDOH — ECONOMIC STABILITY: INCOME INSECURITY: IN THE LAST 12 MONTHS, WAS THERE A TIME WHEN YOU WERE NOT ABLE TO PAY THE MORTGAGE OR RENT ON TIME?: NO

## 2025-02-24 ASSESSMENT — PATIENT HEALTH QUESTIONNAIRE - PHQ9
SUM OF ALL RESPONSES TO PHQ QUESTIONS 1-9: 0
SUM OF ALL RESPONSES TO PHQ9 QUESTIONS 1 & 2: 0
SUM OF ALL RESPONSES TO PHQ QUESTIONS 1-9: 0
1. LITTLE INTEREST OR PLEASURE IN DOING THINGS: NOT AT ALL
SUM OF ALL RESPONSES TO PHQ9 QUESTIONS 1 & 2: 0
SUM OF ALL RESPONSES TO PHQ QUESTIONS 1-9: 0
2. FEELING DOWN, DEPRESSED OR HOPELESS: NOT AT ALL
1. LITTLE INTEREST OR PLEASURE IN DOING THINGS: NOT AT ALL
SUM OF ALL RESPONSES TO PHQ QUESTIONS 1-9: 0
2. FEELING DOWN, DEPRESSED OR HOPELESS: NOT AT ALL

## 2025-02-27 ENCOUNTER — OFFICE VISIT (OUTPATIENT)
Dept: FAMILY MEDICINE CLINIC | Age: 75
End: 2025-02-27

## 2025-02-27 VITALS
HEART RATE: 79 BPM | OXYGEN SATURATION: 97 % | WEIGHT: 223 LBS | BODY MASS INDEX: 30.24 KG/M2 | DIASTOLIC BLOOD PRESSURE: 72 MMHG | SYSTOLIC BLOOD PRESSURE: 122 MMHG

## 2025-02-27 DIAGNOSIS — I10 PRIMARY HYPERTENSION: Chronic | ICD-10-CM

## 2025-02-27 DIAGNOSIS — E11.9 TYPE 2 DIABETES MELLITUS WITHOUT COMPLICATION, WITHOUT LONG-TERM CURRENT USE OF INSULIN (HCC): ICD-10-CM

## 2025-02-27 DIAGNOSIS — L30.9 ECZEMA, UNSPECIFIED TYPE: ICD-10-CM

## 2025-02-27 DIAGNOSIS — E78.00 PURE HYPERCHOLESTEROLEMIA: Primary | ICD-10-CM

## 2025-02-27 DIAGNOSIS — Z85.46 HISTORY OF PROSTATE CANCER: ICD-10-CM

## 2025-02-27 PROBLEM — C61 PROSTATE CANCER (HCC): Status: RESOLVED | Noted: 2022-05-20 | Resolved: 2025-02-27

## 2025-02-27 RX ORDER — TRIAMCINOLONE ACETONIDE 1 MG/G
OINTMENT TOPICAL 2 TIMES DAILY
Qty: 30 G | Refills: 2 | Status: SHIPPED | OUTPATIENT
Start: 2025-02-27 | End: 2025-03-06

## 2025-02-27 ASSESSMENT — ENCOUNTER SYMPTOMS
SHORTNESS OF BREATH: 0
CONSTIPATION: 0
RHINORRHEA: 0
DIARRHEA: 0

## 2025-02-27 NOTE — PROGRESS NOTES
Patient contacted clinic lab regarding urine test for Dr. Parker. Lab orders in [facility] are pending and need to be signed, please sign labs so lab orders are available when patient walks in for urine test. Thanks!   cervical adenopathy.   Skin:     General: Skin is warm and dry.      Findings: Rash (Slightly raised erythematous plaque about 3 cm in circumference left leg and smaller similar lesion right leg) present.   Neurological:      Mental Status: He is alert and oriented to person, place, and time.      Sensory: Sensation is intact.      Motor: Motor function is intact.      Gait: Gait normal.      Deep Tendon Reflexes: Reflexes are normal and symmetric.      Comments: 12 point monofilament test normal    Psychiatric:         Mood and Affect: Mood normal.         Behavior: Behavior normal. Behavior is cooperative.         ASSESSMENT/PLAN:    Rosalino was seen today for follow-up.    Diagnoses and all orders for this visit:    Pure hypercholesterolemia  LDL is at goal of less than 70 with a value of 45.  Triglycerides 122.  HDL 57  -     Comprehensive Metabolic Panel, Fasting; Future  -     CBC with Auto Differential; Future  -     Lipid, Fasting; Future    Type 2 diabetes mellitus without complication, without long-term current use of insulin (HCC)  Diabetes in good control.  Most recent hemoglobin A1c 6.3.  Patient is encouraged to stick with a low carbohydrate diet and try to exercise more.  -     Hemoglobin A1C; Future        -     Diabetes foot exam    History of prostate cancer  Doing well.  Still followed by urology.    Primary hypertension  Blood pressure in good control with current medications no change in medication.    Eczema, unspecified type  -     triamcinolone (KENALOG) 0.1 % ointment; Apply topically 2 times daily for 7 days        Return in about 6 months (around 8/27/2025).    Please note portions of this note were completed with a voicerecognition program.  Efforts were made to edit the dictations but occasionally words are mis-transcribed.

## 2025-03-12 DIAGNOSIS — E11.9 TYPE 2 DIABETES MELLITUS WITHOUT COMPLICATION, WITHOUT LONG-TERM CURRENT USE OF INSULIN (HCC): ICD-10-CM

## 2025-03-12 RX ORDER — METFORMIN HYDROCHLORIDE 500 MG/1
1500 TABLET, EXTENDED RELEASE ORAL
Qty: 270 TABLET | Refills: 1 | Status: SHIPPED | OUTPATIENT
Start: 2025-03-12 | End: 2025-06-10

## 2025-04-19 DIAGNOSIS — I10 PRIMARY HYPERTENSION: ICD-10-CM

## 2025-04-21 RX ORDER — SIMVASTATIN 40 MG
40 TABLET ORAL NIGHTLY
Qty: 90 TABLET | Refills: 1 | Status: SHIPPED | OUTPATIENT
Start: 2025-04-21

## 2025-04-21 RX ORDER — LOSARTAN POTASSIUM 50 MG/1
50 TABLET ORAL DAILY
Qty: 90 TABLET | Refills: 1 | Status: SHIPPED | OUTPATIENT
Start: 2025-04-21

## 2025-08-20 ENCOUNTER — HOSPITAL ENCOUNTER (OUTPATIENT)
Age: 75
Discharge: HOME | End: 2025-08-20
Payer: MEDICARE

## 2025-08-20 DIAGNOSIS — E78.00 PURE HYPERCHOLESTEROLEMIA: ICD-10-CM

## 2025-08-20 DIAGNOSIS — E11.9 TYPE 2 DIABETES MELLITUS WITHOUT COMPLICATION, WITHOUT LONG-TERM CURRENT USE OF INSULIN (HCC): ICD-10-CM

## 2025-08-20 LAB
ALBUMIN SERPL-MCNC: 4.3 G/DL (ref 3.4–5)
ALBUMIN/GLOB SERPL: 1.5 {RATIO} (ref 1.1–2.2)
ALP SERPL-CCNC: 46 U/L (ref 40–129)
ALT SERPL-CCNC: 23 U/L (ref 10–40)
ANION GAP SERPL CALCULATED.3IONS-SCNC: 10 MMOL/L (ref 3–16)
AST SERPL-CCNC: 26 U/L (ref 15–37)
BASOPHILS # BLD: 0.1 K/UL (ref 0–0.2)
BASOPHILS NFR BLD: 0.8 %
BILIRUB SERPL-MCNC: 1 MG/DL (ref 0–1)
BUN SERPL-MCNC: 14 MG/DL (ref 7–20)
CALCIUM SERPL-MCNC: 9.6 MG/DL (ref 8.3–10.6)
CHLORIDE SERPL-SCNC: 103 MMOL/L (ref 99–110)
CHOLEST SERPL-MCNC: 125 MG/DL (ref 0–199)
CO2 SERPL-SCNC: 26 MMOL/L (ref 21–32)
CREAT SERPL-MCNC: 0.9 MG/DL (ref 0.8–1.3)
DEPRECATED RDW RBC AUTO: 13.5 % (ref 12.4–15.4)
EOSINOPHIL # BLD: 0.4 K/UL (ref 0–0.6)
EOSINOPHIL NFR BLD: 6 %
EST. AVERAGE GLUCOSE BLD GHB EST-MCNC: 131.2 MG/DL
GFR SERPLBLD CREATININE-BSD FMLA CKD-EPI: 90 ML/MIN/{1.73_M2}
GLUCOSE P FAST SERPL-MCNC: 136 MG/DL (ref 70–99)
HBA1C MFR BLD: 6.2 %
HCT VFR BLD AUTO: 43.7 % (ref 40.5–52.5)
HDLC SERPL-MCNC: 60 MG/DL (ref 40–60)
HGB BLD-MCNC: 14.7 G/DL (ref 13.5–17.5)
LDL CHOLESTEROL: 39 MG/DL
LYMPHOCYTES # BLD: 1.6 K/UL (ref 1–5.1)
LYMPHOCYTES NFR BLD: 23.9 %
MCH RBC QN AUTO: 30.8 PG (ref 26–34)
MCHC RBC AUTO-ENTMCNC: 33.7 G/DL (ref 31–36)
MCV RBC AUTO: 91.2 FL (ref 80–100)
MONOCYTES # BLD: 0.7 K/UL (ref 0–1.3)
MONOCYTES NFR BLD: 10.6 %
NEUTROPHILS # BLD: 4 K/UL (ref 1.7–7.7)
NEUTROPHILS NFR BLD: 58.7 %
PLATELET # BLD AUTO: 215 K/UL (ref 135–450)
PMV BLD AUTO: 9.8 FL (ref 5–10.5)
POTASSIUM SERPL-SCNC: 4.4 MMOL/L (ref 3.5–5.1)
PROT SERPL-MCNC: 7.1 G/DL (ref 6.4–8.2)
RBC # BLD AUTO: 4.79 M/UL (ref 4.2–5.9)
SODIUM SERPL-SCNC: 139 MMOL/L (ref 136–145)
TRIGL SERPL-MCNC: 131 MG/DL (ref 0–150)
VLDLC SERPL CALC-MCNC: 26 MG/DL
WBC # BLD AUTO: 6.8 K/UL (ref 4–11)

## 2025-08-20 PROCEDURE — 36415 COLL VENOUS BLD VENIPUNCTURE: CPT

## 2025-08-20 PROCEDURE — 85025 COMPLETE CBC W/AUTO DIFF WBC: CPT

## 2025-08-20 PROCEDURE — 80053 COMPREHEN METABOLIC PANEL: CPT

## 2025-08-20 PROCEDURE — 83036 HEMOGLOBIN GLYCOSYLATED A1C: CPT

## 2025-08-20 PROCEDURE — 80061 LIPID PANEL: CPT

## 2025-08-27 ENCOUNTER — OFFICE VISIT (OUTPATIENT)
Dept: FAMILY MEDICINE CLINIC | Age: 75
End: 2025-08-27

## 2025-08-27 VITALS
OXYGEN SATURATION: 97 % | DIASTOLIC BLOOD PRESSURE: 70 MMHG | HEART RATE: 79 BPM | WEIGHT: 222 LBS | SYSTOLIC BLOOD PRESSURE: 122 MMHG | BODY MASS INDEX: 30.11 KG/M2 | TEMPERATURE: 97 F

## 2025-08-27 DIAGNOSIS — L02.416 CUTANEOUS ABSCESS OF LEFT LOWER EXTREMITY: Primary | ICD-10-CM

## 2025-08-27 DIAGNOSIS — E78.00 PURE HYPERCHOLESTEROLEMIA: ICD-10-CM

## 2025-08-27 DIAGNOSIS — E11.9 TYPE 2 DIABETES MELLITUS WITHOUT COMPLICATION, WITHOUT LONG-TERM CURRENT USE OF INSULIN (HCC): ICD-10-CM

## 2025-08-27 DIAGNOSIS — Z85.46 HISTORY OF PROSTATE CANCER: ICD-10-CM

## 2025-08-27 DIAGNOSIS — I10 PRIMARY HYPERTENSION: Chronic | ICD-10-CM

## 2025-08-27 RX ORDER — DOXYCYCLINE HYCLATE 100 MG
100 TABLET ORAL 2 TIMES DAILY
Qty: 20 TABLET | Refills: 0 | Status: SHIPPED | OUTPATIENT
Start: 2025-08-27 | End: 2025-09-06

## 2025-08-27 ASSESSMENT — ENCOUNTER SYMPTOMS
CHEST TIGHTNESS: 0
CONSTIPATION: 0
RHINORRHEA: 1
DIARRHEA: 0
BACK PAIN: 0
SHORTNESS OF BREATH: 0

## 2025-09-03 DIAGNOSIS — E11.9 TYPE 2 DIABETES MELLITUS WITHOUT COMPLICATION, WITHOUT LONG-TERM CURRENT USE OF INSULIN (HCC): ICD-10-CM

## 2025-09-03 RX ORDER — METFORMIN HYDROCHLORIDE 500 MG/1
1500 TABLET, EXTENDED RELEASE ORAL
Qty: 270 TABLET | Refills: 1 | Status: SHIPPED | OUTPATIENT
Start: 2025-09-03 | End: 2025-12-02

## (undated) DEVICE — INVIEW CLEAR LEGGINGS: Brand: CONVERTORS

## (undated) DEVICE — BLADE CLIPPER GEN PURP NS

## (undated) DEVICE — DEVICE WND CLSR V-LOC 3-0 CV-23 90

## (undated) DEVICE — SUTURE VCRL + SZ 3-0 L18IN ABSRB UD SH 1/2 CIR TAPERCUT NDL VCP864D

## (undated) DEVICE — AIRSEAL 8 MM ACCESS PORT AND LOW PROFILE OBTURATOR WITH BLADELESS OPTICAL TIP, 120 MM LENGTH: Brand: AIRSEAL

## (undated) DEVICE — CATHETER URETH 18FR 30CC BLLN SIL ELASTMR F 2 W

## (undated) DEVICE — SOLUTION IV IRRIG WATER 1000ML POUR BRL 2F7114

## (undated) DEVICE — WET SKIN PREP TRAY: Brand: MEDLINE INDUSTRIES, INC.

## (undated) DEVICE — STERILE LATEX POWDER FREE SURGICAL GLOVES WITH HYDROGEL COATING: Brand: PROTEXIS

## (undated) DEVICE — ADHESIVE SKIN CLSR 0.7ML TOP DERMBND ADV

## (undated) DEVICE — TOTAL TRAY, DB, 100% SILI FOLEY, 16FR 10: Brand: MEDLINE

## (undated) DEVICE — SUTURE V-LOC 90 3-0 L6IN ABSRB UD L26MM V-20 1/2 CIR TAPR VLOCM2004

## (undated) DEVICE — BAG RETRIEVAL SPECIMEN SUPERBAG 10 MEDIUM NYLON ITRODUCER

## (undated) DEVICE — INSUFFLATION NEEDLE TO ESTABLISH PNEUMOPERITONEUM.: Brand: INSUFFLATION NEEDLE

## (undated) DEVICE — LARGE NEEDLE DRIVER: Brand: ENDOWRIST

## (undated) DEVICE — 30977 SEE SHARP - ENHANCED INTRAOPERATIVE LAPAROSCOPE CLEANING & DEFOGGING: Brand: 30977 SEE SHARP - ENHANCED INTRAOPERATIVE LAPAROSCOPE CLEANING & DEFOGGING

## (undated) DEVICE — SURGICEL ENDOSCP APPL

## (undated) DEVICE — COVER LT HNDL BLU PLAS

## (undated) DEVICE — APPLICATOR MEDICATED 26 CC SOLUTION HI LT ORNG CHLORAPREP

## (undated) DEVICE — PENCIL SMK EVAC TELSCP 3 M TBNG

## (undated) DEVICE — SUTURE V-LOC 90 3-0 L6IN ABSRB UD CV-23 L17MM 1/2 CIR VLOCM1904

## (undated) DEVICE — SYNCHROSEAL: Brand: DA VINCI ENERGY

## (undated) DEVICE — SYRINGE, LUER LOCK, 10ML: Brand: MEDLINE

## (undated) DEVICE — SUTURE ABSORBABLE L6IN 0 VIOLET GS22 VLOC 90 VLOCM2106

## (undated) DEVICE — ARM DRAPE

## (undated) DEVICE — CANNULA SEAL

## (undated) DEVICE — PAD PT POS 36 IN SURGYPAD DISP

## (undated) DEVICE — COLUMN DRAPE

## (undated) DEVICE — SUTURE MCRYL + SZ 4-0 L27IN ABSRB UD L19MM PS-2 3/8 CIR MCP426H

## (undated) DEVICE — TIP COVER ACCESSORY

## (undated) DEVICE — PROTECTOR EYE PT SELF ADH NS OPT GRD LF

## (undated) DEVICE — CLIP INT M L POLYMER LOK LIG HEM O LOK

## (undated) DEVICE — MERCY FAIRFIELD TURNOVER KIT: Brand: MEDLINE INDUSTRIES, INC.

## (undated) DEVICE — TRI-LUMEN FILTERED TUBE SET WITH ACTIVATED CHARCOAL FILTER: Brand: AIRSEAL

## (undated) DEVICE — BLADELESS OBTURATOR: Brand: WECK VISTA

## (undated) DEVICE — ROBOTIC PK

## (undated) DEVICE — SUTURE ETHBND EXCEL SZ 0 L18IN NONABSORBABLE GRN L26MM CT-2 CX27D